# Patient Record
Sex: FEMALE | Race: WHITE | Employment: PART TIME | URBAN - METROPOLITAN AREA
[De-identification: names, ages, dates, MRNs, and addresses within clinical notes are randomized per-mention and may not be internally consistent; named-entity substitution may affect disease eponyms.]

---

## 2017-08-31 ENCOUNTER — HOSPITAL ENCOUNTER (INPATIENT)
Age: 57
LOS: 1 days | Discharge: HOME | DRG: 313 | End: 2017-08-31
Attending: EMERGENCY MEDICINE | Admitting: INTERNAL MEDICINE
Payer: SUBSIDIZED

## 2017-08-31 ENCOUNTER — APPOINTMENT (OUTPATIENT)
Dept: GENERAL RADIOLOGY | Age: 57
DRG: 313 | End: 2017-08-31
Attending: PHYSICIAN ASSISTANT
Payer: SUBSIDIZED

## 2017-08-31 VITALS
SYSTOLIC BLOOD PRESSURE: 140 MMHG | HEART RATE: 65 BPM | DIASTOLIC BLOOD PRESSURE: 91 MMHG | HEIGHT: 65 IN | BODY MASS INDEX: 37.32 KG/M2 | OXYGEN SATURATION: 97 % | WEIGHT: 224 LBS | TEMPERATURE: 98.4 F | RESPIRATION RATE: 16 BRPM

## 2017-08-31 DIAGNOSIS — I20.0 UNSTABLE ANGINA PECTORIS (HCC): Primary | ICD-10-CM

## 2017-08-31 PROBLEM — R07.9 CHEST PAIN: Status: ACTIVE | Noted: 2017-08-31

## 2017-08-31 LAB
ALBUMIN SERPL-MCNC: 4.1 G/DL (ref 3.4–5)
ALBUMIN/GLOB SERPL: 1.2 {RATIO} (ref 0.8–1.7)
ALP SERPL-CCNC: 65 U/L (ref 45–117)
ALT SERPL-CCNC: 26 U/L (ref 13–56)
ANION GAP SERPL CALC-SCNC: 9 MMOL/L (ref 3–18)
APPEARANCE UR: ABNORMAL
AST SERPL-CCNC: 26 U/L (ref 15–37)
ATRIAL RATE: 59 BPM
ATRIAL RATE: 79 BPM
BACTERIA URNS QL MICRO: ABNORMAL /HPF
BASOPHILS # BLD: 0 K/UL (ref 0–0.1)
BASOPHILS NFR BLD: 0 % (ref 0–2)
BILIRUB SERPL-MCNC: 0.7 MG/DL (ref 0.2–1)
BILIRUB UR QL: NEGATIVE
BUN SERPL-MCNC: 12 MG/DL (ref 7–18)
BUN/CREAT SERPL: 18 (ref 12–20)
CALCIUM SERPL-MCNC: 8.2 MG/DL (ref 8.5–10.1)
CALCULATED P AXIS, ECG09: 33 DEGREES
CALCULATED P AXIS, ECG09: 34 DEGREES
CALCULATED R AXIS, ECG10: 38 DEGREES
CALCULATED R AXIS, ECG10: 52 DEGREES
CALCULATED T AXIS, ECG11: 107 DEGREES
CALCULATED T AXIS, ECG11: 112 DEGREES
CHLORIDE SERPL-SCNC: 108 MMOL/L (ref 100–108)
CK MB CFR SERPL CALC: 1.2 % (ref 0–4)
CK MB SERPL-MCNC: 7.7 NG/ML (ref 5–25)
CK SERPL-CCNC: 631 U/L (ref 26–192)
CO2 SERPL-SCNC: 24 MMOL/L (ref 21–32)
COLOR UR: YELLOW
CREAT SERPL-MCNC: 0.67 MG/DL (ref 0.6–1.3)
DIAGNOSIS, 93000: NORMAL
DIAGNOSIS, 93000: NORMAL
DIFFERENTIAL METHOD BLD: ABNORMAL
EOSINOPHIL # BLD: 0 K/UL (ref 0–0.4)
EOSINOPHIL NFR BLD: 1 % (ref 0–5)
EPITH CASTS URNS QL MICRO: ABNORMAL /LPF (ref 0–5)
ERYTHROCYTE [DISTWIDTH] IN BLOOD BY AUTOMATED COUNT: 14.2 % (ref 11.6–14.5)
GLOBULIN SER CALC-MCNC: 3.3 G/DL (ref 2–4)
GLUCOSE SERPL-MCNC: 109 MG/DL (ref 74–99)
GLUCOSE UR STRIP.AUTO-MCNC: NEGATIVE MG/DL
HCT VFR BLD AUTO: 37 % (ref 35–45)
HGB BLD-MCNC: 12.6 G/DL (ref 12–16)
HGB UR QL STRIP: NEGATIVE
KETONES UR QL STRIP.AUTO: NEGATIVE MG/DL
LEUKOCYTE ESTERASE UR QL STRIP.AUTO: ABNORMAL
LYMPHOCYTES # BLD: 2 K/UL (ref 0.9–3.6)
LYMPHOCYTES NFR BLD: 33 % (ref 21–52)
MAGNESIUM SERPL-MCNC: 2.5 MG/DL (ref 1.6–2.6)
MCH RBC QN AUTO: 34.4 PG (ref 24–34)
MCHC RBC AUTO-ENTMCNC: 34.1 G/DL (ref 31–37)
MCV RBC AUTO: 101.1 FL (ref 74–97)
MONOCYTES # BLD: 0.5 K/UL (ref 0.05–1.2)
MONOCYTES NFR BLD: 9 % (ref 3–10)
NEUTS SEG # BLD: 3.5 K/UL (ref 1.8–8)
NEUTS SEG NFR BLD: 57 % (ref 40–73)
NITRITE UR QL STRIP.AUTO: NEGATIVE
P-R INTERVAL, ECG05: 178 MS
P-R INTERVAL, ECG05: 192 MS
PH UR STRIP: 6 [PH] (ref 5–8)
PLATELET # BLD AUTO: 110 K/UL (ref 135–420)
PMV BLD AUTO: 13.6 FL (ref 9.2–11.8)
POTASSIUM SERPL-SCNC: 3.5 MMOL/L (ref 3.5–5.5)
PROT SERPL-MCNC: 7.4 G/DL (ref 6.4–8.2)
PROT UR STRIP-MCNC: ABNORMAL MG/DL
Q-T INTERVAL, ECG07: 414 MS
Q-T INTERVAL, ECG07: 452 MS
QRS DURATION, ECG06: 80 MS
QRS DURATION, ECG06: 82 MS
QTC CALCULATION (BEZET), ECG08: 447 MS
QTC CALCULATION (BEZET), ECG08: 474 MS
RBC # BLD AUTO: 3.66 M/UL (ref 4.2–5.3)
SODIUM SERPL-SCNC: 141 MMOL/L (ref 136–145)
SP GR UR REFRACTOMETRY: 1.02 (ref 1–1.03)
TROPONIN I BLD-MCNC: <0.04 NG/ML (ref 0–0.08)
TROPONIN I SERPL-MCNC: <0.02 NG/ML (ref 0–0.04)
UROBILINOGEN UR QL STRIP.AUTO: 1 EU/DL (ref 0.2–1)
VENTRICULAR RATE, ECG03: 59 BPM
VENTRICULAR RATE, ECG03: 79 BPM
WBC # BLD AUTO: 6.1 K/UL (ref 4.6–13.2)
WBC URNS QL MICRO: ABNORMAL /HPF (ref 0–4)

## 2017-08-31 PROCEDURE — 65660000000 HC RM CCU STEPDOWN

## 2017-08-31 PROCEDURE — 71010 XR CHEST PORT: CPT

## 2017-08-31 PROCEDURE — 84484 ASSAY OF TROPONIN QUANT: CPT | Performed by: PHYSICIAN ASSISTANT

## 2017-08-31 PROCEDURE — 85025 COMPLETE CBC W/AUTO DIFF WBC: CPT | Performed by: PHYSICIAN ASSISTANT

## 2017-08-31 PROCEDURE — 83735 ASSAY OF MAGNESIUM: CPT | Performed by: PHYSICIAN ASSISTANT

## 2017-08-31 PROCEDURE — 80053 COMPREHEN METABOLIC PANEL: CPT | Performed by: PHYSICIAN ASSISTANT

## 2017-08-31 PROCEDURE — 82550 ASSAY OF CK (CPK): CPT | Performed by: PHYSICIAN ASSISTANT

## 2017-08-31 PROCEDURE — 93005 ELECTROCARDIOGRAM TRACING: CPT

## 2017-08-31 PROCEDURE — 99284 EMERGENCY DEPT VISIT MOD MDM: CPT

## 2017-08-31 PROCEDURE — 81001 URINALYSIS AUTO W/SCOPE: CPT | Performed by: PHYSICIAN ASSISTANT

## 2017-08-31 PROCEDURE — 74011250637 HC RX REV CODE- 250/637: Performed by: EMERGENCY MEDICINE

## 2017-08-31 RX ORDER — CEPHALEXIN 500 MG/1
500 CAPSULE ORAL 2 TIMES DAILY
Qty: 14 CAP | Refills: 0 | Status: SHIPPED | OUTPATIENT
Start: 2017-08-31 | End: 2017-09-07

## 2017-08-31 RX ORDER — GUAIFENESIN 100 MG/5ML
81 LIQUID (ML) ORAL DAILY
Qty: 1 TAB | Refills: 0 | Status: SHIPPED | OUTPATIENT
Start: 2017-08-31 | End: 2020-02-17

## 2017-08-31 RX ORDER — NITROGLYCERIN 400 UG/1
1 SPRAY ORAL
Qty: 1 BOTTLE | Refills: 0 | Status: SHIPPED | OUTPATIENT
Start: 2017-08-31 | End: 2017-09-10

## 2017-08-31 RX ORDER — ENOXAPARIN SODIUM 100 MG/ML
1 INJECTION SUBCUTANEOUS
Status: DISCONTINUED | OUTPATIENT
Start: 2017-08-31 | End: 2017-08-31

## 2017-08-31 RX ORDER — ASPIRIN 325 MG
325 TABLET ORAL
Status: COMPLETED | OUTPATIENT
Start: 2017-08-31 | End: 2017-08-31

## 2017-08-31 RX ORDER — CEPHALEXIN 250 MG/1
500 CAPSULE ORAL
Status: COMPLETED | OUTPATIENT
Start: 2017-08-31 | End: 2017-08-31

## 2017-08-31 RX ADMIN — ASPIRIN 325 MG ORAL TABLET 325 MG: 325 PILL ORAL at 19:30

## 2017-08-31 RX ADMIN — CEPHALEXIN 500 MG: 250 CAPSULE ORAL at 18:21

## 2017-08-31 NOTE — ED NOTES
I performed a brief evaluation, including history and physical, of the patient here in triage and I have determined that pt will need further treatment and evaluation from the main side ER physician. I have placed initial orders to help in expediting patients care.      August 31, 2017 at 2:04 PM - BHAVIN Jean        Visit Vitals    BP (!) 152/100 (BP 1 Location: Left arm, BP Patient Position: At rest)    Pulse 83    Temp 98.4 °F (36.9 °C)    Resp 18    Ht 5' 5\" (1.651 m)    Wt 101.6 kg (224 lb)    SpO2 96%    BMI 37.28 kg/m2

## 2017-08-31 NOTE — ED PROVIDER NOTES
HPI Comments: 3:32 PM April Elsie Ann is a 61 y.o. female with a h/o Ovarian CA and Thyroid Disease who presents to the ED c/o sharp intermittent CP, lasting 3-10 minutes, that began this morning when she woke up. The patient is also complaining of associated dizziness, L arm numbness/paresthesia, and feeling \"giddy\" at the onset. She states that exertion exacerbates her CP and resting makes it a \"little better. \" The patient denies NV, SOB, diaphoresis, a h/o heart disease and additional complaints or concerns. The history is provided by the patient. Past Medical History:   Diagnosis Date    [facility] [address]     [facility] cancer ([facility] [address] [facility]     Thyroid disease        Past Surgical History:   Procedure Laterality Date    HX HYSTERECTOMY           Family History:   Problem Relation Age of Onset    Thyroid Disease Mother     Cancer Brother 24     brain    Cancer Maternal Aunt      lung    Breast Problems Maternal Aunt 29    Cancer Paternal Aunt     Cancer Paternal Uncle     Cancer Maternal Grandmother     Cancer Maternal Grandfather     Cancer Other      child leukemia    Heart Attack Other        Social History     Social History    Marital status:      Spouse name: N/A    Number of children: N/A    Years of education: N/A     Occupational History    Not on file. Social History Main Topics    Smoking status: Current Every Day Smoker    Smokeless tobacco: Never Used    Alcohol use No    Drug use: No    Sexual activity: No     Other Topics Concern    Not on file     Social History Narrative         ALLERGIES: Adhesive    Review of Systems   Cardiovascular: Positive for chest pain. Neurological: Positive for dizziness and numbness (L arm). + L arm paresthesia    All other systems reviewed and are negative.       Vitals:    08/31/17 1356   BP: (!) 152/100   Pulse: 83   Resp: 18   Temp: 98.4 °F (36.9 °C)   SpO2: 96%   Weight: 101.6 kg (224 lb)   Height: 5' 5\" (1.651 m) Physical Exam   Constitutional: She is oriented to person, place, and time. She appears well-developed. HENT:   Head: Normocephalic and atraumatic. Eyes: EOM are normal. Pupils are equal, round, and reactive to light. Neck: Normal range of motion. Neck supple. Cardiovascular: Normal rate, regular rhythm and normal heart sounds. Exam reveals no friction rub. No murmur heard. Pulmonary/Chest: Effort normal and breath sounds normal. No respiratory distress. She has no wheezes. Abdominal: Soft. She exhibits no distension. There is no tenderness. There is no rebound and no guarding. Musculoskeletal: Normal range of motion. Neurological: She is alert and oriented to person, place, and time. Skin: Skin is warm and dry. Psychiatric: She has a normal mood and affect. Her behavior is normal. Thought content normal.        MDM  Number of Diagnoses or Management Options  Diagnosis management comments:   EKG: Sinus is 79 normal axis normal intervals no ST elevation or depression or hypertrophy. 50-year-old female with exertional chest pressure or tightness better with rest nausea and vomiting or diaphoresis. It is reproducible with exertion. Low suspicion of PE or DVT no further testing at this point. Dw/ dr Alli Scott; will consutl. D/w dr Remi Briones; will admit. Cxr napd    uA noted, asx; Po abx sufficient     Pt refusing to stay; understands importance testing and risk of death and or permanent disability from leaving. Has to take care of mom, no alternatives. Will send to outpt stress. Refer to cardiology.      ekg #2 no change     ED Course       Procedures    Scribe Attestation      Mackenzie  acting as a scribe for and in the presence of Raj Luther MD      August 31, 2017 at 4:11 PM       Provider Attestation:      I personally performed the services described in the documentation, reviewed the documentation, as recorded by the scribe in my presence, and it accurately and completely records my words and actions.  August 31, 2017 at 4:11 PM - Melissa Srinivasan MD

## 2017-08-31 NOTE — DISCHARGE INSTRUCTIONS
Chest Pain: Care Instructions  Your Care Instructions  There are many things that can cause chest pain. Some are not serious and will get better on their own in a few days. But some kinds of chest pain need more testing and treatment. Your doctor may have recommended a follow-up visit in the next 8 to 12 hours. If you are not getting better, you may need more tests or treatment. Even though your doctor has released you, you still need to watch for any problems. The doctor carefully checked you, but sometimes problems can develop later. If you have new symptoms or if your symptoms do not get better, get medical care right away. If you have worse or different chest pain or pressure that lasts more than 5 minutes or you passed out (lost consciousness), call 911 or seek other emergency help right away. A medical visit is only one step in your treatment. Even if you feel better, you still need to do what your doctor recommends, such as going to all suggested follow-up appointments and taking medicines exactly as directed. This will help you recover and help prevent future problems. How can you care for yourself at home? · Rest until you feel better. · Take your medicine exactly as prescribed. Call your doctor if you think you are having a problem with your medicine. · Do not drive after taking a prescription pain medicine. When should you call for help? Call 911 if:  · You passed out (lost consciousness). · You have severe difficulty breathing. · You have symptoms of a heart attack. These may include:  ¨ Chest pain or pressure, or a strange feeling in your chest.  ¨ Sweating. ¨ Shortness of breath. ¨ Nausea or vomiting. ¨ Pain, pressure, or a strange feeling in your back, neck, jaw, or upper belly or in one or both shoulders or arms. ¨ Lightheadedness or sudden weakness. ¨ A fast or irregular heartbeat.   After you call 911, the  may tell you to chew 1 adult-strength or 2 to 4 low-dose aspirin. Wait for an ambulance. Do not try to drive yourself. Call your doctor today if:  · You have any trouble breathing. · Your chest pain gets worse. · You are dizzy or lightheaded, or you feel like you may faint. · You are not getting better as expected. · You are having new or different chest pain. Where can you learn more? Go to http://shelley-ayan.info/. Enter A120 in the search box to learn more about \"Chest Pain: Care Instructions. \"  Current as of: March 20, 2017  Content Version: 11.3  © 8768-3679 [employer]. Care instructions adapted under license by [employer] (which disclaims liability or warranty for this information). If you have questions about a medical condition or this instruction, always ask your healthcare professional. [address] any warranty or liability for your use of this information.

## 2017-08-31 NOTE — Clinical Note
Status[de-identified] Inpatient [101] Type of Bed: Telemetry [19] Inpatient Hospitalization Certified Necessary for the Following Reasons: 3. Patient receiving treatment that can only be provided in an inpatient setting (further clarification in H&P documentation) Admitting Diagnosis: Chest pain [901857] Admitting Physician: Mary Anne Dunlap Attending Physician: Tyler Low [[id number]] Estimated Length of Stay: 2 Midnights Discharge Plan[de-identified] Home with Office Follow-up

## 2017-09-06 ENCOUNTER — TELEPHONE (OUTPATIENT)
Dept: CARDIOLOGY CLINIC | Age: 57
End: 2017-09-06

## 2019-02-01 ENCOUNTER — HOSPITAL ENCOUNTER (OUTPATIENT)
Dept: GENERAL RADIOLOGY | Age: 59
Discharge: HOME | End: 2019-02-01
Payer: MEDICAID

## 2019-02-01 DIAGNOSIS — Z72.0 TOBACCO ABUSE: ICD-10-CM

## 2019-02-01 PROCEDURE — 71046 X-RAY EXAM CHEST 2 VIEWS: CPT

## 2019-03-06 ENCOUNTER — HOSPITAL ENCOUNTER (OUTPATIENT)
Dept: LAB | Age: 59
Discharge: HOME | End: 2019-03-06

## 2019-03-06 LAB — XX-LABCORP SPECIMEN COL,LCBCF: NORMAL

## 2019-03-06 PROCEDURE — 99001 SPECIMEN HANDLING PT-LAB: CPT

## 2019-04-29 ENCOUNTER — HOSPITAL ENCOUNTER (OUTPATIENT)
Dept: MAMMOGRAPHY | Age: 59
Discharge: HOME | End: 2019-04-29
Attending: FAMILY MEDICINE
Payer: MEDICAID

## 2019-04-29 ENCOUNTER — HOSPITAL ENCOUNTER (OUTPATIENT)
Dept: BONE DENSITY | Age: 59
Discharge: HOME | End: 2019-04-29
Attending: FAMILY MEDICINE
Payer: MEDICAID

## 2019-04-29 ENCOUNTER — HOSPITAL ENCOUNTER (OUTPATIENT)
Dept: LAB | Age: 59
Discharge: HOME | End: 2019-04-29

## 2019-04-29 DIAGNOSIS — Z12.39 SCREENING BREAST EXAMINATION: ICD-10-CM

## 2019-04-29 DIAGNOSIS — Z13.820 OSTEOPOROSIS SCREENING: ICD-10-CM

## 2019-04-29 LAB — XX-LABCORP SPECIMEN COL,LCBCF: NORMAL

## 2019-04-29 PROCEDURE — 99001 SPECIMEN HANDLING PT-LAB: CPT

## 2019-04-29 PROCEDURE — 77063 BREAST TOMOSYNTHESIS BI: CPT

## 2019-04-29 PROCEDURE — 77080 DXA BONE DENSITY AXIAL: CPT

## 2019-05-06 ENCOUNTER — HOSPITAL ENCOUNTER (OUTPATIENT)
Dept: GENERAL RADIOLOGY | Age: 59
Discharge: HOME | End: 2019-05-06
Payer: MEDICAID

## 2019-05-06 DIAGNOSIS — M25.476 EFFUSION OF ANKLE AND FOOT JOINT: ICD-10-CM

## 2019-05-06 DIAGNOSIS — M25.473 EFFUSION OF ANKLE AND FOOT JOINT: ICD-10-CM

## 2019-05-06 PROCEDURE — 73610 X-RAY EXAM OF ANKLE: CPT

## 2019-07-19 ENCOUNTER — HOSPITAL ENCOUNTER (OUTPATIENT)
Dept: GENERAL RADIOLOGY | Age: 59
Discharge: HOME | End: 2019-07-19
Payer: MEDICAID

## 2019-07-19 DIAGNOSIS — M79.641 BILATERAL HAND PAIN: ICD-10-CM

## 2019-07-19 DIAGNOSIS — J06.9 ACUTE RESPIRATORY DISEASE: ICD-10-CM

## 2019-07-19 DIAGNOSIS — M79.642 BILATERAL HAND PAIN: ICD-10-CM

## 2019-07-19 PROCEDURE — 73130 X-RAY EXAM OF HAND: CPT

## 2019-07-19 PROCEDURE — 71046 X-RAY EXAM CHEST 2 VIEWS: CPT

## 2019-09-30 ENCOUNTER — HOSPITAL ENCOUNTER (OUTPATIENT)
Dept: CT IMAGING | Age: 59
Discharge: HOME | End: 2019-09-30
Attending: FAMILY MEDICINE
Payer: MEDICAID

## 2019-09-30 DIAGNOSIS — R05.3 CHRONIC COUGH: ICD-10-CM

## 2019-09-30 PROCEDURE — 71250 CT THORAX DX C-: CPT

## 2019-10-21 ENCOUNTER — HOSPITAL ENCOUNTER (OUTPATIENT)
Dept: GENERAL RADIOLOGY | Age: 59
Discharge: HOME | End: 2019-10-21
Payer: MEDICAID

## 2019-10-21 DIAGNOSIS — M25.572 LEFT ANKLE PAIN: ICD-10-CM

## 2019-10-21 PROCEDURE — 73610 X-RAY EXAM OF ANKLE: CPT

## 2019-11-04 ENCOUNTER — HOSPITAL ENCOUNTER (OUTPATIENT)
Dept: LAB | Age: 59
Discharge: HOME | End: 2019-11-04

## 2019-11-04 LAB — XX-LABCORP SPECIMEN COL,LCBCF: NORMAL

## 2019-11-04 PROCEDURE — 99001 SPECIMEN HANDLING PT-LAB: CPT

## 2019-11-08 ENCOUNTER — HOSPITAL ENCOUNTER (OUTPATIENT)
Dept: CT IMAGING | Age: 59
Discharge: HOME | End: 2019-11-08
Attending: FAMILY MEDICINE

## 2019-11-08 DIAGNOSIS — R91.8 LUNG NODULES: ICD-10-CM

## 2019-11-18 ENCOUNTER — OFFICE VISIT (OUTPATIENT)
Dept: SURGERY | Age: 59
End: 2019-11-18

## 2019-11-18 VITALS
RESPIRATION RATE: 18 BRPM | WEIGHT: 210 LBS | DIASTOLIC BLOOD PRESSURE: 72 MMHG | SYSTOLIC BLOOD PRESSURE: 118 MMHG | OXYGEN SATURATION: 95 % | BODY MASS INDEX: 34.99 KG/M2 | HEART RATE: 74 BPM | HEIGHT: 65 IN | TEMPERATURE: 97.9 F

## 2019-11-18 DIAGNOSIS — K80.20 GALLSTONES: Primary | ICD-10-CM

## 2019-11-18 NOTE — PROGRESS NOTES
General Surgery Consult      Dayan Peter  Admit date: (Not on file)    MRN: [id number]     : 1960     Age: 60 y.o. Attending Physician: Betito Ramesh MD, FACS      Subjective:     April is a 60 y.o. female who was referred to me for symptomatic cholelithiasis. The patient has stated that she been having she is been complaining of abdominal pain for about 1 year . She said that mainly the pain is localized in the right upper quadrant . She also has been having some lower abdominal pain localizing in the suprapubic area that she stated that based seems like menstrual-like pain . She said that the right upper quadrant is worse after eating fatty food especially cheese and milk shake. She also stated that she has nausea. She had an ultrasound that showed cholelithiasis but no evidence of cholecystitis. She had a history of previous  with a midline laparotomy extending from the suprapubic area to the umbilicus. The patient  has not had jaundice, acholic stools or dark urine and has not had a history of pancreatitis or hepatitis. There is a recent CT scan of the chest that showed cholelithiasis. Patient Active Problem List    Diagnosis Date Noted    Chest pain 2017    Spinal stenosis of lumbar region with radiculopathy 2014    Degenerative lumbar spinal stenosis 2014    GERD (gastroesophageal reflux disease) 02/10/2014    Hypertension 02/10/2014    Hypothyroid 02/10/2014     Past Medical History:   Diagnosis Date    [facility] ([facility] [address]     Ovarian cancer ([facility] [address]     Thyroid disease       Past Surgical History:   Procedure Laterality Date    COLONOSCOPY N/A 2019    COLONOSCOPY, SCREENING with hot snare polypectomy, w/ Bx performed by Jalen Kelly MD at [address] [place] [address] HX HYSTERECTOMY        Social History     Tobacco Use    Smoking status: Current Every Day Smoker    Smokeless tobacco: Never Used   Substance Use Topics    Alcohol use:  No Social History     Tobacco Use   Smoking Status Current Every Day Smoker   Smokeless Tobacco Never Used     Family History   Problem Relation Age of Onset    Thyroid Disease Mother     Cancer Brother 24        brain    Cancer Maternal Aunt         lung    Breast Problems Maternal Aunt 29    Cancer Paternal Aunt     Cancer Paternal Uncle     Cancer Maternal Grandmother     Cancer Maternal Grandfather     Cancer Other         child leukemia    Heart Attack Other       Current Outpatient Medications   Medication Sig    mv-min-vit C-Glu-Tawana [id number] (AIRBORNE, WITH LYSINE ACETATE,) 250-12.5 mg chew Take 2 Tabs by mouth.  mv,Ca,min-iron-FA-guarana-caff (ONE-A-DAY WOMEN'S ACTIVE) 18 mg iron- 400 mcg-180 mg tab Take 1 Tab by mouth.  aspirin 81 mg chewable tablet Take 1 Tab by mouth daily.  Hydrochlorothiazide 12.5 mg tablet Take 12.5 mg by mouth daily.  levothyroxine (SYNTHROID) 75 mcg tablet Take 1 Tab by mouth Daily (before breakfast). (Patient taking differently: Take 125 mcg by mouth Daily (before breakfast). )    gabapentin (NEURONTIN) 100 mg capsule Take 1 Cap by mouth three (3) times daily.  buPROPion SR (WELLBUTRIN SR) 150 mg SR tablet Take one tab po once a day for 3 days, then increase to one tab po bid x 12 weeks. No current facility-administered medications for this visit.        Allergies   Allergen Reactions    Latex Hives    Morphine Anaphylaxis    Adhesive Hives        Review of Systems:  Constitutional: negative  Eyes: negative  Ears, Nose, Mouth, Throat, and Face: negative  Respiratory: negative  Cardiovascular: negative  Gastrointestinal: positive for nausea and abdominal pain  Genitourinary:negative  Integument/Breast: negative  Hematologic/Lymphatic: negative  Musculoskeletal:negative  Neurological: negative  Behavioral/Psychiatric: negative  Endocrine: negative  Allergic/Immunologic: negative    Objective:     Visit Vitals  /72   Pulse 74   Temp 97.9 °F (36.6 °C) (Oral)   Resp 18   Ht 5' 5\" (1.651 m)   Wt 95.3 kg (210 lb)   SpO2 95%   BMI 34.95 kg/m²       Physical Exam:      General:  in no apparent distress, alert, oriented times 3, afebrile, normal vitals and anicteric   Eyes:  conjunctivae and sclerae normal, pupils equal, round, reactive to light   Throat & Neck: no erythema or exudates noted and neck supple and symmetrical; no palpable masses   Lungs:   clear to auscultation bilaterally   Heart:  Regular rate and rhythm   Abdomen:   rounded, soft, nontender except for localized right upper quadrant tenderness, nondistended, no masses or organomegaly. There is a midline laparotomy in the lower abdomen that is well-healed. There is no evidence of any abdominal wall hernias. Extremities: extremities normal, atraumatic, no cyanosis or edema   Skin: Normal.         Imaging and Lab Review:     CBC:   Lab Results   Component Value Date/Time    WBC 6.1 08/31/2017 03:00 PM    RBC 3.66 (L) 08/31/2017 03:00 PM    HGB 12.6 08/31/2017 03:00 PM    HCT 37.0 08/31/2017 03:00 PM    PLATELET 656 (L) 04/52/5841 03:00 PM     BMP:   Lab Results   Component Value Date/Time    Glucose 109 (H) 08/31/2017 03:00 PM    Sodium 141 08/31/2017 03:00 PM    Potassium 3.5 08/31/2017 03:00 PM    Chloride 108 08/31/2017 03:00 PM    CO2 24 08/31/2017 03:00 PM    BUN 12 08/31/2017 03:00 PM    Creatinine 0.67 08/31/2017 03:00 PM    Calcium 8.2 (L) 08/31/2017 03:00 PM     CMP:  Lab Results   Component Value Date/Time    Glucose 109 (H) 08/31/2017 03:00 PM    Sodium 141 08/31/2017 03:00 PM    Potassium 3.5 08/31/2017 03:00 PM    Chloride 108 08/31/2017 03:00 PM    CO2 24 08/31/2017 03:00 PM    BUN 12 08/31/2017 03:00 PM    Creatinine 0.67 08/31/2017 03:00 PM    Calcium 8.2 (L) 08/31/2017 03:00 PM    Anion gap 9 08/31/2017 03:00 PM    BUN/Creatinine ratio 18 08/31/2017 03:00 PM    Alk.  phosphatase 65 08/31/2017 03:00 PM    Protein, total 7.4 08/31/2017 03:00 PM    Albumin 4.1 08/31/2017 03:00 PM Globulin 3.3 08/31/2017 03:00 PM    A-G Ratio 1.2 08/31/2017 03:00 PM       No results found for this or any previous visit (from the past 24 hour(s)). images and reports reviewed    Assessment:   Dayan Marion is a 60 y.o. female is presenting with abdominal pain and a picture of symptomatic cholelithiasis. I explained the indications for robotic cholecystectomy as well as the alternatives. I discussed the potential risks, including but not limited to bleeding, wound infection, trocar injuries, bile duct injury and leak, and also the possible need for conversion to open procedure. I also explained the firefly technology and how it allow us to visualize the biliary tree to avoid bile duct injury or leak. She indicates that she understands the risks, accepts and wishes to proceed. I also explained to the patient that her lower abdominal pain are not related to her cholelithiasis and most likely related to some GYN reason or possible adhesions. Plan:     1. Will schedule for robotic cholecystectomy with firefly (ICG) technology for identification of the biliary tree. 2. No heavy lifting (more than 15 pounds) for 2 weeks after the surgery. 3. Avoid constipation after the surgery (take stool softener).      Please call me if you have any questions (cell phone: [phone])     Signed By: Jojo Vera MD     November 18, 2019

## 2019-11-18 NOTE — PROGRESS NOTES
Chief Complaint   Patient presents with    Abdominal Pain     pt referred by Kathi Gomes. Pt c/o sharp pains in center of stomach. States feels bloated and gets constipated and diarrhea.

## 2019-11-19 ENCOUNTER — TELEPHONE (OUTPATIENT)
Dept: SURGERY | Age: 59
End: 2019-11-19

## 2019-11-19 NOTE — TELEPHONE ENCOUNTER
Spoke with Ms. America Lang to discuss scheduling surgery (cholecystectomy) with Dr. Jose Rodriguez. Ms. America Lang states she works at [employer] and would prefer to wait until after January 3, 2020 to schedule her surgery because of her busy work schedule during the holiday season. I told Ms. Peter I'll contact her with further information tomorrow/need to obtain OR availability for new year.

## 2020-02-10 ENCOUNTER — HOSPITAL ENCOUNTER (OUTPATIENT)
Dept: LAB | Age: 60
Discharge: HOME | End: 2020-02-10

## 2020-02-10 ENCOUNTER — HOSPITAL ENCOUNTER (OUTPATIENT)
Dept: LAB | Age: 60
Discharge: HOME | End: 2020-02-10
Payer: MEDICAID

## 2020-02-10 DIAGNOSIS — Z01.818 PRE-OP EVALUATION: ICD-10-CM

## 2020-02-10 LAB
ANION GAP SERPL CALC-SCNC: 3 MMOL/L (ref 3–18)
ATRIAL RATE: 64 BPM
BASOPHILS # BLD: 0 K/UL (ref 0–0.1)
BASOPHILS NFR BLD: 0 % (ref 0–2)
BUN SERPL-MCNC: 15 MG/DL (ref 7–18)
BUN/CREAT SERPL: 25 (ref 12–20)
CALCIUM SERPL-MCNC: 9.1 MG/DL (ref 8.5–10.1)
CALCULATED P AXIS, ECG09: 15 DEGREES
CALCULATED R AXIS, ECG10: 7 DEGREES
CALCULATED T AXIS, ECG11: 17 DEGREES
CHLORIDE SERPL-SCNC: 111 MMOL/L (ref 100–111)
CO2 SERPL-SCNC: 26 MMOL/L (ref 21–32)
CREAT SERPL-MCNC: 0.6 MG/DL (ref 0.6–1.3)
DIAGNOSIS, 93000: NORMAL
DIFFERENTIAL METHOD BLD: ABNORMAL
EOSINOPHIL # BLD: 0.1 K/UL (ref 0–0.4)
EOSINOPHIL NFR BLD: 1 % (ref 0–5)
ERYTHROCYTE [DISTWIDTH] IN BLOOD BY AUTOMATED COUNT: 13.6 % (ref 11.6–14.5)
GLUCOSE SERPL-MCNC: 90 MG/DL (ref 74–99)
HCT VFR BLD AUTO: 40.9 % (ref 35–45)
HGB BLD-MCNC: 13.7 G/DL (ref 12–16)
LYMPHOCYTES # BLD: 1.9 K/UL (ref 0.9–3.6)
LYMPHOCYTES NFR BLD: 33 % (ref 21–52)
MCH RBC QN AUTO: 32.3 PG (ref 24–34)
MCHC RBC AUTO-ENTMCNC: 33.5 G/DL (ref 31–37)
MCV RBC AUTO: 96.5 FL (ref 74–97)
MONOCYTES # BLD: 1 K/UL (ref 0.05–1.2)
MONOCYTES NFR BLD: 17 % (ref 3–10)
NEUTS SEG # BLD: 2.9 K/UL (ref 1.8–8)
NEUTS SEG NFR BLD: 49 % (ref 40–73)
P-R INTERVAL, ECG05: 178 MS
PLATELET # BLD AUTO: 155 K/UL (ref 135–420)
PMV BLD AUTO: 13.3 FL (ref 9.2–11.8)
POTASSIUM SERPL-SCNC: 4.2 MMOL/L (ref 3.5–5.5)
Q-T INTERVAL, ECG07: 436 MS
QRS DURATION, ECG06: 80 MS
QTC CALCULATION (BEZET), ECG08: 449 MS
RBC # BLD AUTO: 4.24 M/UL (ref 4.2–5.3)
SODIUM SERPL-SCNC: 140 MMOL/L (ref 136–145)
VENTRICULAR RATE, ECG03: 64 BPM
WBC # BLD AUTO: 5.9 K/UL (ref 4.6–13.2)

## 2020-02-10 PROCEDURE — 36415 COLL VENOUS BLD VENIPUNCTURE: CPT

## 2020-02-10 PROCEDURE — 80048 BASIC METABOLIC PNL TOTAL CA: CPT

## 2020-02-10 PROCEDURE — 93005 ELECTROCARDIOGRAM TRACING: CPT

## 2020-02-10 PROCEDURE — 85025 COMPLETE CBC W/AUTO DIFF WBC: CPT

## 2020-02-13 RX ORDER — SERTRALINE HYDROCHLORIDE 25 MG/1
25 TABLET, FILM COATED ORAL DAILY
Status: ON HOLD | COMMUNITY
End: 2021-07-21

## 2020-02-13 RX ORDER — CHOLECALCIFEROL (VITAMIN D3) 125 MCG
CAPSULE ORAL
COMMUNITY

## 2020-02-13 RX ORDER — LANOLIN ALCOHOL/MO/W.PET/CERES
500 CREAM (GRAM) TOPICAL DAILY
COMMUNITY

## 2020-02-15 ENCOUNTER — ANESTHESIA EVENT (OUTPATIENT)
Dept: SURGERY | Age: 60
End: 2020-02-15
Payer: MEDICAID

## 2020-02-17 ENCOUNTER — ANESTHESIA (OUTPATIENT)
Dept: SURGERY | Age: 60
End: 2020-02-17
Payer: MEDICAID

## 2020-02-17 ENCOUNTER — HOSPITAL ENCOUNTER (OUTPATIENT)
Age: 60
Setting detail: OUTPATIENT SURGERY
Discharge: HOME | End: 2020-02-17
Attending: OTOLARYNGOLOGY | Admitting: OTOLARYNGOLOGY
Payer: MEDICAID

## 2020-02-17 VITALS
RESPIRATION RATE: 20 BRPM | WEIGHT: 221 LBS | TEMPERATURE: 98.1 F | HEIGHT: 66 IN | HEART RATE: 67 BPM | BODY MASS INDEX: 35.52 KG/M2 | SYSTOLIC BLOOD PRESSURE: 133 MMHG | OXYGEN SATURATION: 92 % | DIASTOLIC BLOOD PRESSURE: 77 MMHG

## 2020-02-17 PROCEDURE — 74011250636 HC RX REV CODE- 250/636: Performed by: NURSE ANESTHETIST, CERTIFIED REGISTERED

## 2020-02-17 PROCEDURE — 77030018846 HC SOL IRR STRL H20 ICUM -A: Performed by: OTOLARYNGOLOGY

## 2020-02-17 PROCEDURE — 76060000032 HC ANESTHESIA 0.5 TO 1 HR: Performed by: OTOLARYNGOLOGY

## 2020-02-17 PROCEDURE — 74011250637 HC RX REV CODE- 250/637: Performed by: OTOLARYNGOLOGY

## 2020-02-17 PROCEDURE — 88305 TISSUE EXAM BY PATHOLOGIST: CPT

## 2020-02-17 PROCEDURE — 76210000020 HC REC RM PH II FIRST 0.5 HR: Performed by: OTOLARYNGOLOGY

## 2020-02-17 PROCEDURE — 76010000138 HC OR TIME 0.5 TO 1 HR: Performed by: OTOLARYNGOLOGY

## 2020-02-17 PROCEDURE — 76210000063 HC OR PH I REC FIRST 0.5 HR: Performed by: OTOLARYNGOLOGY

## 2020-02-17 PROCEDURE — 77030026438 HC STYL ET INTUB CARD -A: Performed by: NURSE ANESTHETIST, CERTIFIED REGISTERED

## 2020-02-17 PROCEDURE — 74011250637 HC RX REV CODE- 250/637: Performed by: NURSE ANESTHETIST, CERTIFIED REGISTERED

## 2020-02-17 PROCEDURE — 74011000250 HC RX REV CODE- 250: Performed by: NURSE ANESTHETIST, CERTIFIED REGISTERED

## 2020-02-17 PROCEDURE — 77030008683 HC TU ET CUF COVD -A: Performed by: NURSE ANESTHETIST, CERTIFIED REGISTERED

## 2020-02-17 RX ORDER — FAMOTIDINE 20 MG/1
20 TABLET, FILM COATED ORAL ONCE
Status: COMPLETED | OUTPATIENT
Start: 2020-02-17 | End: 2020-02-17

## 2020-02-17 RX ORDER — MAGNESIUM SULFATE 100 %
4 CRYSTALS MISCELLANEOUS AS NEEDED
Status: DISCONTINUED | OUTPATIENT
Start: 2020-02-17 | End: 2020-02-17 | Stop reason: HOSPADM

## 2020-02-17 RX ORDER — FENTANYL CITRATE 50 UG/ML
50 INJECTION, SOLUTION INTRAMUSCULAR; INTRAVENOUS AS NEEDED
Status: DISCONTINUED | OUTPATIENT
Start: 2020-02-17 | End: 2020-02-17 | Stop reason: HOSPADM

## 2020-02-17 RX ORDER — GLYCOPYRROLATE 0.2 MG/ML
INJECTION INTRAMUSCULAR; INTRAVENOUS AS NEEDED
Status: DISCONTINUED | OUTPATIENT
Start: 2020-02-17 | End: 2020-02-17 | Stop reason: HOSPADM

## 2020-02-17 RX ORDER — SODIUM CHLORIDE 0.9 % (FLUSH) 0.9 %
5-40 SYRINGE (ML) INJECTION AS NEEDED
Status: DISCONTINUED | OUTPATIENT
Start: 2020-02-17 | End: 2020-02-17 | Stop reason: HOSPADM

## 2020-02-17 RX ORDER — SUCCINYLCHOLINE CHLORIDE 20 MG/ML
INJECTION INTRAMUSCULAR; INTRAVENOUS AS NEEDED
Status: DISCONTINUED | OUTPATIENT
Start: 2020-02-17 | End: 2020-02-17 | Stop reason: HOSPADM

## 2020-02-17 RX ORDER — SODIUM CHLORIDE, SODIUM LACTATE, POTASSIUM CHLORIDE, CALCIUM CHLORIDE 600; 310; 30; 20 MG/100ML; MG/100ML; MG/100ML; MG/100ML
75 INJECTION, SOLUTION INTRAVENOUS CONTINUOUS
Status: DISCONTINUED | OUTPATIENT
Start: 2020-02-17 | End: 2020-02-17 | Stop reason: HOSPADM

## 2020-02-17 RX ORDER — FENTANYL CITRATE 50 UG/ML
INJECTION, SOLUTION INTRAMUSCULAR; INTRAVENOUS AS NEEDED
Status: DISCONTINUED | OUTPATIENT
Start: 2020-02-17 | End: 2020-02-17 | Stop reason: HOSPADM

## 2020-02-17 RX ORDER — DEXTROSE 50 % IN WATER (D50W) INTRAVENOUS SYRINGE
25-50 AS NEEDED
Status: DISCONTINUED | OUTPATIENT
Start: 2020-02-17 | End: 2020-02-17 | Stop reason: HOSPADM

## 2020-02-17 RX ORDER — NEOSTIGMINE METHYLSULFATE 1 MG/ML
INJECTION, SOLUTION INTRAVENOUS AS NEEDED
Status: DISCONTINUED | OUTPATIENT
Start: 2020-02-17 | End: 2020-02-17 | Stop reason: HOSPADM

## 2020-02-17 RX ORDER — CEPHALEXIN 500 MG/1
500 CAPSULE ORAL 3 TIMES DAILY
Qty: 21 CAP | Refills: 0 | Status: SHIPPED | OUTPATIENT
Start: 2020-02-17 | End: 2020-02-24

## 2020-02-17 RX ORDER — OXYMETAZOLINE HCL 0.05 %
SPRAY, NON-AEROSOL (ML) NASAL AS NEEDED
Status: DISCONTINUED | OUTPATIENT
Start: 2020-02-17 | End: 2020-02-17 | Stop reason: HOSPADM

## 2020-02-17 RX ORDER — ROCURONIUM BROMIDE 10 MG/ML
INJECTION, SOLUTION INTRAVENOUS AS NEEDED
Status: DISCONTINUED | OUTPATIENT
Start: 2020-02-17 | End: 2020-02-17 | Stop reason: HOSPADM

## 2020-02-17 RX ORDER — PROPOFOL 10 MG/ML
INJECTION, EMULSION INTRAVENOUS AS NEEDED
Status: DISCONTINUED | OUTPATIENT
Start: 2020-02-17 | End: 2020-02-17 | Stop reason: HOSPADM

## 2020-02-17 RX ORDER — ONDANSETRON 2 MG/ML
INJECTION INTRAMUSCULAR; INTRAVENOUS AS NEEDED
Status: DISCONTINUED | OUTPATIENT
Start: 2020-02-17 | End: 2020-02-17 | Stop reason: HOSPADM

## 2020-02-17 RX ORDER — ONDANSETRON 2 MG/ML
4 INJECTION INTRAMUSCULAR; INTRAVENOUS ONCE
Status: COMPLETED | OUTPATIENT
Start: 2020-02-17 | End: 2020-02-17

## 2020-02-17 RX ORDER — DEXAMETHASONE SODIUM PHOSPHATE 4 MG/ML
INJECTION, SOLUTION INTRA-ARTICULAR; INTRALESIONAL; INTRAMUSCULAR; INTRAVENOUS; SOFT TISSUE AS NEEDED
Status: DISCONTINUED | OUTPATIENT
Start: 2020-02-17 | End: 2020-02-17 | Stop reason: HOSPADM

## 2020-02-17 RX ORDER — MIDAZOLAM HYDROCHLORIDE 1 MG/ML
INJECTION, SOLUTION INTRAMUSCULAR; INTRAVENOUS AS NEEDED
Status: DISCONTINUED | OUTPATIENT
Start: 2020-02-17 | End: 2020-02-17 | Stop reason: HOSPADM

## 2020-02-17 RX ORDER — SODIUM CHLORIDE, SODIUM LACTATE, POTASSIUM CHLORIDE, CALCIUM CHLORIDE 600; 310; 30; 20 MG/100ML; MG/100ML; MG/100ML; MG/100ML
25 INJECTION, SOLUTION INTRAVENOUS CONTINUOUS
Status: DISCONTINUED | OUTPATIENT
Start: 2020-02-17 | End: 2020-02-17 | Stop reason: HOSPADM

## 2020-02-17 RX ORDER — INSULIN LISPRO 100 [IU]/ML
INJECTION, SOLUTION INTRAVENOUS; SUBCUTANEOUS ONCE
Status: DISCONTINUED | OUTPATIENT
Start: 2020-02-17 | End: 2020-02-17 | Stop reason: HOSPADM

## 2020-02-17 RX ORDER — SODIUM CHLORIDE 0.9 % (FLUSH) 0.9 %
5-40 SYRINGE (ML) INJECTION EVERY 8 HOURS
Status: DISCONTINUED | OUTPATIENT
Start: 2020-02-17 | End: 2020-02-17 | Stop reason: HOSPADM

## 2020-02-17 RX ORDER — LIDOCAINE HYDROCHLORIDE 20 MG/ML
INJECTION, SOLUTION EPIDURAL; INFILTRATION; INTRACAUDAL; PERINEURAL AS NEEDED
Status: DISCONTINUED | OUTPATIENT
Start: 2020-02-17 | End: 2020-02-17 | Stop reason: HOSPADM

## 2020-02-17 RX ADMIN — SUCCINYLCHOLINE CHLORIDE 140 MG: 20 INJECTION, SOLUTION INTRAMUSCULAR; INTRAVENOUS at 10:24

## 2020-02-17 RX ADMIN — ROCURONIUM BROMIDE 5 MG: 10 INJECTION, SOLUTION INTRAVENOUS at 10:24

## 2020-02-17 RX ADMIN — FENTANYL CITRATE 50 MCG: 50 INJECTION INTRAMUSCULAR; INTRAVENOUS at 10:24

## 2020-02-17 RX ADMIN — MIDAZOLAM HYDROCHLORIDE 1 MG: 2 INJECTION, SOLUTION INTRAMUSCULAR; INTRAVENOUS at 10:12

## 2020-02-17 RX ADMIN — Medication 3 MG: at 10:45

## 2020-02-17 RX ADMIN — FENTANYL CITRATE 50 MCG: 50 INJECTION INTRAMUSCULAR; INTRAVENOUS at 10:12

## 2020-02-17 RX ADMIN — PROPOFOL 150 MG: 10 INJECTION, EMULSION INTRAVENOUS at 10:24

## 2020-02-17 RX ADMIN — FAMOTIDINE 20 MG: 20 TABLET, FILM COATED ORAL at 08:44

## 2020-02-17 RX ADMIN — ONDANSETRON 4 MG: 2 INJECTION INTRAMUSCULAR; INTRAVENOUS at 10:38

## 2020-02-17 RX ADMIN — SODIUM CHLORIDE, SODIUM LACTATE, POTASSIUM CHLORIDE, AND CALCIUM CHLORIDE 25 ML/HR: 600; 310; 30; 20 INJECTION, SOLUTION INTRAVENOUS at 08:38

## 2020-02-17 RX ADMIN — MIDAZOLAM HYDROCHLORIDE 1 MG: 2 INJECTION, SOLUTION INTRAMUSCULAR; INTRAVENOUS at 10:24

## 2020-02-17 RX ADMIN — ONDANSETRON 4 MG: 2 INJECTION INTRAMUSCULAR; INTRAVENOUS at 11:13

## 2020-02-17 RX ADMIN — GLYCOPYRROLATE 0.4 MG: 0.2 INJECTION INTRAMUSCULAR; INTRAVENOUS at 10:45

## 2020-02-17 RX ADMIN — LIDOCAINE HYDROCHLORIDE 80 MG: 20 INJECTION, SOLUTION EPIDURAL; INFILTRATION; INTRACAUDAL; PERINEURAL at 10:24

## 2020-02-17 RX ADMIN — DEXAMETHASONE SODIUM PHOSPHATE 8 MG: 4 INJECTION, SOLUTION INTRAMUSCULAR; INTRAVENOUS at 10:38

## 2020-02-17 NOTE — DISCHARGE INSTRUCTIONS
Acute Pain After Surgery: Care Instructions  Your Care Instructions    It's common to have some pain after surgery. Pain doesn't mean that something is wrong or that the surgery didn't go well. But when the pain is severe, it's important to work with your doctor to manage it. It's also important to be aware of a few facts about pain and pain medicine. · You are the only person who knows what your pain feels like. So be sure to tell your doctor when you are in pain or when the pain changes. Then he or she will know how to adjust your medicines. · Pain is often easier to control right after it starts. So it may be better to take regular doses of pain medicine and not wait until the pain gets bad. · Medicine can help control pain. But this doesn't mean you'll have no pain. Medicine works to keep the pain at a level you can live with. With time, you will feel better. Follow-up care is a key part of your treatment and safety. Be sure to make and go to all appointments, and call your doctor if you are having problems. It's also a good idea to know your test results and keep a list of the medicines you take. How can you care for yourself at home? · Be safe with medicines. Read and follow all instructions on the label. ? If the doctor gave you a prescription medicine for pain, take it as prescribed. ? If you are not taking a prescription pain medicine, ask your doctor if you can take an over-the-counter medicine. · If you take an over-the-counter pain medicine, such as acetaminophen (Tylenol), ibuprofen (Advil, Motrin), or naproxen (Aleve), read and follow all instructions on the label. · Do not take two or more pain medicines at the same time unless the doctor told you to. · Do not drink alcohol while you are taking pain medicines. · Try to walk each day if your doctor recommends it. Start by walking a little more than you did the day before. Bit by bit, increase the amount you walk.  Walking increases blood flow. It also helps prevent pneumonia and constipation. · To prevent constipation from opioid pain medicines:  ? Talk to your doctor about a laxative. ? Include fruits, vegetables, beans, and whole grains in your diet each day. These foods are high in fiber. ? Drink plenty of fluids, enough so that your urine is light yellow or clear like water. Drink water, fruit juice, or other drinks that do not contain caffeine or alcohol. If you have kidney, heart, or liver disease and have to limit fluids, talk with your doctor before you increase the amount of fluids you drink. ? Take a fiber supplement, such as Citrucel or Metamucil, every day if needed. Read and follow all instructions on the label. If you take pain medicine for more than a few days, talk to your doctor before you take fiber. When should you call for help? Call your doctor now or seek immediate medical care if:    · Your pain gets worse.     · Your pain is not controlled by medicine.    Watch closely for changes in your health, and be sure to contact your doctor if you have any problems. Where can you learn more? Go to http://shelley-ayan.info/. Enter ([phone] [phone] in the search box to learn more about \"Acute Pain After Surgery: Care Instructions. \"  Current as of: June 26, 2019  Content Version: 12.2  © 8021-9154 [employer]. Care instructions adapted under license by [employer] (which disclaims liability or warranty for this information). If you have questions about a medical condition or this instruction, always ask your healthcare professional. [place] [address] any warranty or liability for your use of this information.     DISCHARGE SUMMARY from Nurse    PATIENT INSTRUCTIONS:    After general anesthesia or intravenous sedation, for 24 hours or while taking prescription Narcotics:  · Limit your activities  · Do not drive and operate hazardous machinery  · Do not make important personal or business decisions  · Do  not drink alcoholic beverages  · If you have not urinated within 8 hours after discharge, please contact your surgeon on call. Report the following to your surgeon:  · Excessive pain, swelling, redness or odor of or around the surgical area  · Temperature over 100.5  · Nausea and vomiting lasting longer than 4 hours or if unable to take medications  · Any signs of decreased circulation or nerve impairment to extremity: change in color, persistent  numbness, tingling, coldness or increase pain  · Any questions    *  Please give a list of your current medications to your Primary Care Provider. *  Please update this list whenever your medications are discontinued, doses are      changed, or new medications (including over-the-counter products) are added. *  Please carry medication information at all times in case of emergency situations. These are general instructions for a healthy lifestyle:    No smoking/ No tobacco products/ Avoid exposure to second hand smoke  Surgeon General's Warning:  Quitting smoking now greatly reduces serious risk to your health. Obesity, smoking, and sedentary lifestyle greatly increases your risk for illness    A healthy diet, regular physical exercise & weight monitoring are important for maintaining a healthy lifestyle    You may be retaining fluid if you have a history of heart failure or if you experience any of the following symptoms:  Weight gain of 3 pounds or more overnight or 5 pounds in a week, increased swelling in our hands or feet or shortness of breath while lying flat in bed. Please call your doctor as soon as you notice any of these symptoms; do not wait until your next office visit. The discharge information has been reviewed with the patient and parent. The patient and parent verbalized understanding.   Discharge medications reviewed with the patient and appropriate educational materials and side effects teaching were provided.   ___________________________________________________________________________________________________________________________________

## 2020-02-17 NOTE — H&P
[address] [place] [address]  HISTORY AND PHYSICAL Name:  Cristi Maldonado 
MR#:   [id number] :  1960 ACCOUNT #:  [de-identified] ADMIT DATE:  2020 REASON FOR EVALUATION:  Hoarseness. HISTORY AND PHYSICAL ON ADMISSION:  The patient is a 34-year-old female who has had difficulties with significant hoarseness. She was noted to have bilateral polyposis, ongoing for at least years' time. The patient did have thyroid function test check which were within normal limits. She is a  at [employer], does have to utilize her voice for a significant amount. She is wishing this to be removed. The patient is a tobacco user. We have also discussed the possibility of speech therapy; however, the patient wants just to proceed with removal of the above. PAST MEDICAL HISTORY:  Significant for reflux disease, spinal stenosis, hypothyroidism, hypertension. PAST SURGICAL HISTORY:  Includes a hysterectomy and a tonsillectomy. REVIEW OF SYSTEMS:  Contributory for anxiety and depression as well as headaches and hearing loss. FAMILY HISTORY:  Noncontributory. SOCIAL HISTORY:  The patient smokes one and a half pack of cigarettes per day. Denies any significant alcohol intake. MEDICATION USE:   Includes Nexium, multivitamins, Chantix, Synthroid. REVIEW OF SYSTEMS:  Noncontributory. PHYSICAL EXAMINATION: 
GENERAL:  Reveals a well-developed, well-nourished 34-year-old female. VITAL SIGNS:  Height 5 feet 5, weight of 210. HEENT:  Ear exam reveals normal-appearing tympanic membranes. The oral cavity and  oropharynx are within normal limits. The intranasal exam reveals no evidence of any mucopurulent discharge. NECK:  Supple, nontender. No masses are palpable. CHEST:  Bilaterally clear. HEART:  S1, S2. No murmur audible. EXTREMITIES:  Within normal limits. IMPRESSION:  Hoarseness secondary to bilateral vocal cord polyposis. PLAN:  The patient will undergo removal of polyposis via suspension microlaryngoscopy with excision of lesion. Risks, benefits, and complications were discussed with the patient who understands and is aware. The patient will undergo the above procedure on 02/17/2020. Nj Ritchie MD 
 
 
[id number] 
D:  02/17/2020 6:55 
T:  02/17/2020 12:36 JOB #:  [id number]

## 2020-02-17 NOTE — ANESTHESIA POSTPROCEDURE EVALUATION
Procedure(s):  EXCISION OF VOCAL CORD POLYP.    general    Anesthesia Post Evaluation      Multimodal analgesia: multimodal analgesia used between 6 hours prior to anesthesia start to PACU discharge  Patient location during evaluation: bedside  Patient participation: complete - patient participated  Level of consciousness: awake  Pain management: adequate  Airway patency: patent  Anesthetic complications: no  Cardiovascular status: stable  Respiratory status: acceptable  Hydration status: acceptable  Post anesthesia nausea and vomiting:  controlled      Vitals Value Taken Time   /77 2/17/2020 11:19 AM   Temp 36.4 °C (97.5 °F) 2/17/2020 11:00 AM   Pulse 68 2/17/2020 11:26 AM   Resp 16 2/17/2020 11:26 AM   SpO2 93 % 2/17/2020 11:26 AM   Vitals shown include unvalidated device data.

## 2020-02-17 NOTE — ANESTHESIA PREPROCEDURE EVALUATION
Relevant Problems   No relevant active problems       Anesthetic History   No history of anesthetic complications            Review of Systems / Medical History  Patient summary reviewed and pertinent labs reviewed    Pulmonary          Smoker         Neuro/Psych   Within defined limits           Cardiovascular    Hypertension                   GI/Hepatic/Renal     GERD           Endo/Other      Hypothyroidism       Other Findings              Physical Exam    Airway  Mallampati: II  TM Distance: 4 - 6 cm  Neck ROM: normal range of motion   Mouth opening: Normal     Cardiovascular    Rhythm: regular  Rate: normal         Dental    Dentition: Edentulous     Pulmonary  Breath sounds clear to auscultation               Abdominal  GI exam deferred       Other Findings            Anesthetic Plan    ASA: 2  Anesthesia type: general          Induction: Intravenous  Anesthetic plan and risks discussed with: Patient

## 2020-02-19 NOTE — OP NOTES
[address] [place] [address]   OPERATIVE REPORT    Name:  Logan Dangelo  MR#:   [id number]  :  1960  ACCOUNT #:  [de-identified]  DATE OF SERVICE:  2020    PREOPERATIVE DIAGNOSIS:  Hoarseness. POSTOPERATIVE DIAGNOSIS:  Hoarseness. PROCEDURE PERFORMED:  Suspension microlaryngoscopy with removal of bilateral vocal cord polyposis. SURGEON:  Chantelle Gregory MD    ASSISTANT:  None. ANESTHESIA:  General endotracheal anesthesia. COMPLICATIONS:  None. DRAINS:  None. SPECIMENS REMOVED:  Sent to Pathology. IMPLANTS:  None. ESTIMATED BLOOD LOSS:  Less than 5 mL. OPERATIVE FINDINGS:  Bilateral vocal cord polyposis. OPERATION PERFORMED:  The patient was brought to the operating room, placed supine on the operating room table. General endotracheal anesthesia was given per anesthesiology department. Once the patient was sufficiently anesthetized, a Dedo laryngoscope was inserted in the patient's oral cavity. This was passed in through the oral cavity, oropharynx, hypopharynx, and laryngeal area. Once the larynx was completely visualized, the Lewy suspension system was attached to the laryngoscope and suspended from the nurses' Zhu stand. The operating microscope was then brought into view and used to visualize the larynx. The patient was noted to have significant polyposis noted bilaterally. With the use of micro-cup forceps as well as micro scissors and under visualization utilizing the microscope, the polyp on the right was completely removed. This was done with preservation of vocalis muscle. No difficulties encountered. A small flap of mucosa was preserved to wrap around the vocal cord itself. Once this was complete, a similar procedure was performed on the contralateral side. Bleeding was then controlled with tightly applied Hea-Wmyigdgzfu-egvgla pledgets which were packed into the laryngeal inlet. After about 10 minutes time, this was removed. No bleeding was seen. The patient was subsequently taken from the operating room to the recovery room in stable condition after correct needle and sponge count were obtained.       Louann Foster MD      [id number]  D:  02/18/2020 21:47  T:  02/18/2020 23:02  JOB #:  [id number]

## 2020-03-11 ENCOUNTER — HOSPITAL ENCOUNTER (OUTPATIENT)
Dept: PHYSICAL THERAPY | Age: 60
Discharge: HOME | End: 2020-03-11
Payer: MEDICAID

## 2020-03-11 PROCEDURE — 92522 EVALUATE SPEECH PRODUCTION: CPT

## 2020-03-11 PROCEDURE — 92507 TX SP LANG VOICE COMM INDIV: CPT

## 2020-03-11 NOTE — PROGRESS NOTES
[facility] [employer] [facility]  [address] [place] [address]  [phone] fax    Plan of Care/ Statement of Necessity for Speech Therapy Services    Patient name: Dayan Peter Start of Care: 3/11/2020   Referral source: Geovanny Lawrence MD : 1960    Medical Diagnosis: Dysphonia [R49.0]  Payor: BLUE CROSS MEDICAID / Plan: [employer] / Product Type: Managed Care Medicaid /  Onset Date:   exacerbation 2020   Treatment Diagnosis: Dysphonia [R49.0]   Prior Hospitalization: see medical history Provider#: [id number]   Medications: Verified on Patient summary List    Comorbidities: HTN; hypothyroid, hx of ovarian cancer     Prior Level of Function: hoarse vocal quality for ~1 year prior to VF polyposis removal procedure per pt     The Plan of Care and following information is based on the information from the initial evaluation. Assessment/ key information:   Pt is a 61 y/o female referred to O/P ST s/p bilateral vocal cord polyposis removal procedure completed 2020. Pt c/o low vocal volume, increased strain when speaking in a louder voice, constant breathy vocal quality, and throat pain. She reports heavy consumption of caffeinated beverages and limited water intake daily. She also reports frequent coughing and throat clearing. The pt is a current tobacco user, however reports significant reduction in daily intake recently (~2 cigarettes per day). Pt reports inadequate vocal volume impacts her ability to perform requirements of her occupation as a . A voice evaluation was completed today along with teaching session. Patient exhibited a moderately-severe dysphonia c/b vocal asthenia, breathy vocal quality, and phonation breaks during vocal and conversational tasks. Pt exhibited increased strain during vocal tasks. Pt spoke with a back throat focus and exhibited thoracic breathing during structured vocal tasks and conversation.  Pt exhibited laryngeal tension that was visible in her neck during vocal tasks. Her modal pitch was WNLs @ 220.00 Hz  for her age and gender. Her pitch range was significantly low at 12 semi-tones. She spoke with significantly reduced vocal intensity~ 48 dB during the evaluation. Pt was intermittently throat clearing during today's session. Pt reports frequently throat clearing and coughing throughout the day. Vowel prolongation /a/ (a measure of respiratory/phonatory efficiency): duration: 4 seconds with faded phonation;  pitch:  246.94 Hz, Intensity: 52 dB ; vocal quality: phonation breaks, excessively breathy, strained. Increased strain during vowel prolongations with excessive tension noted in throat and mandible. Vowel prolongation /i/ (ee) : duration: 3 seconds;  pitch: 277.18 Hz, Intensity: 50 dB  Vocal quality: breathy, strained, phonation breaks. Excessive tension observed in the neck, chest, mandible during the prolongation    Frequency (as measured by chromatic tuner and keyboard):  Pitch Range: 12 Semi-tones                    Lowest: 174.61 Hz              Highest:  349. 23Hz              Modal pitch in conversation: 220 Hz  Modal pitch in readin.63 Hz    Perceptual assessment: Consensus Auditory Perceptual Evaluation of Voice (CAPE-V) was administered by this SLP :On a scale of 0 to 100 with 100 equaling the most extreme deviance she attained the following scores: (see attached in paper chart): Overall Severity: 65/100 consistent; moderate-severely deviant, roughness: 45/100 intermittent moderately deviant , breathiness 75/100 consistent severely deviant; strain: 56/100 intermittent, moderately deviant; pitch: 12/100 intermittent, mildly deviant; loudness: (too soft)  74/100 consistent; severely deviant     Diagnostic tx was conducted today with the following technique: use of good diaphragmatic breathing, reduced tension,  and increased orality which produced a fairly improved vocal provided with increased vocal projection; pt was taught how to inhibit this behavior with a hard swallow/sip of water to reduce throat clearing/coughing to decrease vocal abuse. Instructions on ways to increase vocal hygiene. Based on the objective and subjective data above, pt presents with a moderately-severe dysphonia. It is recommended that Ms. Peter receive skilled speech therapy services as it is medically necessary to improve her voice for vocal ADL tasks related to home and community, independence in work environment, communicate effectively and for QOL. Problem List:    []aphasic  []dysarthric  []dysphagic       []alexic  []agraphic  [x]dysphonia       []dysfluency   []Cognitive-Linguistic Disorder       []other   Treatment Plan may include any combination of the following: Voice Treatment and Patient Education    Patient / Family readiness to learn indicated by: asking questions   Persons(s) to be included in education: patient  Barriers to Learning/Limitations: none   Patient Goal (s): Talk so people can hear me  Patient Self Reported Health Status: good  Rehabilitation Potential: good     Short Term Goals: To be accomplished in 4 weeks  1. Patient will demonstrate good vocal hygiene by  (a) decreasing coughing by ~50% (b) decreasing throat clearing by 50% (c) consume ~ 64 oz of water daily in order to decrease trauma/irritation to the vocal folds to help improve her voice. 2. Patient will use diaphragmatic/abdominal breath management with good connection of airflow to phonation for automatics, words, phrases and simple sentences with 80% accuracy with min-mod cues during structured phonation/speaking tasks to improve breath support.   3. Patient will demonstrate a more forward placement of tone, easy onsets of phonation and a legato speaking style with adequate loudness, decreased tension and improved pitch (higher) for automatics, words, phrases and short sentences with 80% accuracy with min-mod cues in order to improve her voice. 4. Patient will perform vocal fx exercises (such as Stemple's, vocal entrainment-pitch ex's) with modA in order to improve the balance between respiration, phonation and resonance and improve vocal strength and flexibility for improved vocal health and voice production. Long Term Goals: To be accomplished in 8  weeks   1. Patient will produce improved voice moderately-severe dysphonia to WNL's including decreased tension, increased facial focus, and easier voice and increased vocal stamina with a more appropriate pitch, adequate  loudness, and vocal quality 85% of the time for vocal ADL/IADL tasks as measured by objective measurements, SLP assessment and SLP/patient agreement for perceptual judgment. Frequency / Duration: Patient to be seen 2 times per week for 8 weeks:    Patient/ Caregiver education and instruction: Diagnosis, prognosis, vocal hygiene, diaphragmatic breath support, resonant voice, anatomy/physiology of voicing    Noe Mccarthy, SLP 3/11/2020 5:33 PM  MS CCC-SLP  Speech-Language Pathologist     ________________________________________________________________________    I certify that the above Therapy Services are being furnished while the patient is under my care. I agree with the treatment plan and certify that this therapy is necessary.     Physician's Signature:____________Date:_________TIME:________    ** Signature, Date and Time must be completed for valid certification **    Please sign and return to [facility]  JONN  [address] [place] [address]  [phone] fax     Thank you

## 2020-03-11 NOTE — PROGRESS NOTES
ST DAILY TREATMENT NOTE/VOICE EVALUATION    Patient Name: Dayan Peter  Date:3/11/2020  : 1960  [x]  Patient  Verified  Payor: BLUE CROSS MEDICAID / Plan: [facility] [employer] / Product Type: Managed Care Medicaid /   In time: 3:15  Out time: 4:25  Total Treatment Time (min): 70  Visit #: 1 of 16    SUBJECTIVE  Pain Level (0-10 scale): 6  Any medication changes, allergies to medications, adverse drug reactions, diagnosis change, or new procedure performed?: [x] No    [] Yes (see summary sheet for update)  Subjective functional status/changes:   [] No changes reported  Pt is a 61 y/o female referred to O/P ST s/p bilateral vocal cord polyposis removal procedure completed 2020. Pt c/o low vocal volume, increased strain when speaking in a louder voice, constant breathy vocal quality, and throat pain. She reports heavy consumption of caffeinated beverages and limited water intake daily. She also reports frequent coughing and throat clearing. The pt is a current tobacco user, however reports significant reduction in daily intake recently (~2 cigarettes per day). Pt reports inadequate vocal volume impacts her ability to perform requirements of her occupation as a .      Date of Onset: 2020  Social History: tobacco user,  at [employer]    Prior Functional level: hoarse vocal quality for ~1 year prior to VF polyposis removal procedure per pt. Prior WNLs    OBJECTIVE    OUTPATIENT VOICE EVALUATION    Description of Problem: s/p bilateral vocal cord polyposis removal procedure completed 2020.  Pt c/o low vocal volume, increased strain when speaking in a louder voice, constant breathy vocal quality, and throat pain    Onset of Problem: 2020  Variability through Day: c/o vocal fatigue  Voice Usage: above average   Know Abuse/Misuse: yelling/screaming, excessive throat clearing/coughing     Pitch:   [x] WNL  [] Low  [] High     Comments:  Loudness: [] WNL  [] Excessive [x] Inadequate     Comments:  Quality:  [] WNL      Comments: breathy, vocal asthenia, pitch breaks, intermittent strain   Resonance: [x] WNL  [] Hypernasal [] Hyponasal     Comments:  Rate:  [x] WNL  [] Fast  [] Slow     Comments:  Range:  [] WNL  [] Montone [x] Excessive variability    Comments:  Observations [x] Pitch Breaks [] Glottal Coe [] Stridency [] Hard Glottal Attacks    [] Aphonia [x] Diplophonia [x] Phonation Breaks     [x] Severe Fluctuations on Quality    []Other: breathy , vocal asthenia     [] Aphonia interspersed with intermittent phonation      Vowel prolongation /a/ (a measure of respiratory/phonatory efficiency): duration: 4 seconds with faded phonation;  pitch:  246.94 Hz, Intensity: 52 dB ; vocal quality: phonation breaks, excessively breathy, strained. Increased strain during vowel prolongations with excessive tension noted in throat and mandible. Vowel prolongation /i/ (ee) : duration: 3 seconds;  pitch: 277.18 Hz, Intensity: 50 dB  Vocal quality: breathy, strained, phonation breaks. Excessive tension observed in the neck, chest, mandible during the prolongation    Frequency (as measured by chromatic tuner and keyboard):  Pitch Range: 12 Semi-tones                    Lowest: 174.61 Hz              Highest:  349. 23Hz              Modal pitch in conversation: 220 Hz  Modal pitch in readin.63 Hz    Is breathing audible? [] Yes [x] No  Is phonation on inhalation? [] Yes [x] No  Is breathing pattern irregular? [] Yes [x] No  Does patient have difficulty sustaining expiration? [] Yes [x] No  Does patient focus on breathing? [] Yes [x] No  Clavicular breathing? [] Yes [x] No  Reduced respiratory/speech coordination? [x] Yes [] No    Frequent coughing? [x] Yes [] No   Frequent throat clearing? [x] Yes [] No   Extrinsic laryngeal tension? [x] Yes [] No  Visible tension present: [x]neck  [x] chest  [] mandible  Evidence of tight throat during phonation?  [x] Yes [] No  Complaints of tired throat? [x] Yes [] No  Tone focus: []retracted tongue  [] closed mouth   []appropriate [x] back throat focus  Perceptual assessment: Consensus Auditory Perceptual Evaluation of Voice (CAPE-V) was administered by this SLP :On a scale of 0 to 100 with 100 equaling the most extreme deviance she attained the following scores: (see attached in paper chart): Overall Severity: 65/100 consistent; moderate-severely deviant, roughness: 45/100 intermittent moderately deviant , breathiness 75/100 consistent severely deviant; strain: 56/100 intermittent, moderately deviant; pitch: 12/100 intermittent, mildly deviant; loudness: (too soft)  74/100 consistent; severely deviant     Diagnostic tx was conducted today with the following technique: use of good diaphragmatic breathing, reduced tension,  and increased orality which produced a fairly improved vocal provided with increased vocal projection; pt was taught how to inhibit this behavior with a hard swallow/sip of water to reduce throat clearing/coughing to decrease vocal abuse. Instructions on ways to increase vocal hygiene.      Speech:   Oral Verbal Apraxia: [x] None     [] Mild [] Moderate [] Severe   Dysarthria:  [x] None     [] Mild [] Moderate [] Severe   Intelligibility  [x] WNL      [] Words [] Phrases [] Sentences       [] Conversation  % intelligible:   Agrammatic:  [] Yes       [x] No  Hearing screened by:    Passed [] Yes [] No  Comments:   Fluency:  [x]WNL  []Dysfluent   Comments:  Motor Speech:  [x]Articulation WFL    []Apraxia  []oral []verbal  ( []min []mod []severe)    []Dysarthria    Intelligibility:  Deviations noted:  [x]none  []yes, describe:   Auditory Comprehension: [x] WFL [] Impaired - describe: (subjective)  Verbal Expression: [x] WFL [] Impaired - describe: (subjective)  Reading comprehension: [x] WFL [] Impaired - describe: (subjective)  Cognitive skills: [x] WFL [] Impaired - describe: (subjective)      Patient/Caregiver instruction/education: [] Review HEP    [] Progressed/Changed    HEP/Handouts given: diaphragmatic breathing with teaching and handout, vocal hygiene with skilled instruction /handout     Pain Level (0-10 scale) post treatment: 6    ASSESSMENT  [x]  See Plan of Care    Short Term Goals: To be accomplished in 4 weeks  [x]  See Plan of Care  Long Term Goals:  To be accomplished in 8 weeks   [x]  See Plan of Care  PLAN  [x]  Upgrade activities as tolerated     []  Continue plan of care  []  Discharge due to:__  [x] Other: tx initiated     Rhianna Hermosillo SLP 3/11/2020  3:15 PM  MS CCC-SLP  Speech-Language Pathologist

## 2020-03-18 NOTE — H&P
[address] [place] [address]   HISTORY AND PHYSICAL    Name:  Romeo Jones  MR#:   [id number]  :  1960  ACCOUNT #:  [de-identified]  ADMIT DATE:  2020    REASON FOR EVALUATION:  Hoarseness. HISTORY AND PHYSICAL ON ADMISSION:  The patient is a 59-year-old female who has had difficulties with significant hoarseness. She was noted to have bilateral polyposis, ongoing for at least years' time. The patient did have thyroid function test check which were within normal limits. She is a  at [employer], does have to utilize her voice for a significant amount. She is wishing this to be removed. The patient is a tobacco user. We have also discussed the possibility of speech therapy; however, the patient wants just to proceed with removal of the above. PAST MEDICAL HISTORY:  Significant for reflux disease, spinal stenosis, hypothyroidism, hypertension. PAST SURGICAL HISTORY:  Includes a hysterectomy and a tonsillectomy. REVIEW OF SYSTEMS:  Contributory for anxiety and depression as well as headaches and hearing loss. FAMILY HISTORY:  Noncontributory. SOCIAL HISTORY:  The patient smokes one and a half pack of cigarettes per day. Denies any significant alcohol intake. MEDICATION USE:   Includes Nexium, multivitamins, Chantix, Synthroid. REVIEW OF SYSTEMS:  Noncontributory. PHYSICAL EXAMINATION:  GENERAL:  Reveals a well-developed, well-nourished 59-year-old female. VITAL SIGNS:  Height 5 feet 5, weight of 210. HEENT:  Ear exam reveals normal-appearing tympanic membranes. The oral cavity and  oropharynx are within normal limits. The intranasal exam reveals no evidence of any mucopurulent discharge. NECK:  Supple, nontender. No masses are palpable. CHEST:  Bilaterally clear. HEART:  S1, S2. No murmur audible. EXTREMITIES:  Within normal limits. IMPRESSION:  Hoarseness secondary to bilateral vocal cord polyposis.     PLAN:  The patient will undergo removal of polyposis via suspension microlaryngoscopy with excision of lesion. Risks, benefits, and complications were discussed with the patient who understands and is aware. The patient will undergo the above procedure on 02/17/2020.       Eleazar Milner MD      [id number]  D:  02/17/2020 6:55  T:  02/17/2020 12:36  JOB #:  [id number]

## 2020-04-01 ENCOUNTER — TELEPHONE (OUTPATIENT)
Dept: PHYSICAL THERAPY | Age: 60
End: 2020-04-01

## 2020-07-22 ENCOUNTER — HOSPITAL ENCOUNTER (OUTPATIENT)
Dept: MAMMOGRAPHY | Age: 60
Discharge: HOME | End: 2020-07-22
Attending: FAMILY MEDICINE
Payer: MEDICAID

## 2020-07-22 DIAGNOSIS — Z12.31 VISIT FOR SCREENING MAMMOGRAM: ICD-10-CM

## 2020-07-22 PROCEDURE — 77063 BREAST TOMOSYNTHESIS BI: CPT

## 2020-08-13 NOTE — PROGRESS NOTES
[facility]  [address] [facility]  [phone] fax    Speech Therapy Discharge Summary    Patient name: Dayan Peter Start of Care: 3/11/2020   Referral source: Cora Rutledge MD : 1960               Medical Diagnosis: Dysphonia [R49.0]  Payor: BLUE CROSS MEDICAID / Plan: [address] [place] [address] / Product Type: Managed Care Medicaid /  Onset Date:   exacerbation 2020   Treatment Diagnosis: Dysphonia [R49.0]   Prior Hospitalization: see medical history Provider#: [id number]   Medications: Verified on Patient summary List    Comorbidities: HTN; hypothyroid, hx of ovarian cancer     Prior Level of Function: hoarse vocal quality for ~1 year prior to VF polyposis removal procedure per pt   Visits from Start of Care: 1   Missed Visits: 0    Reporting Period : 3/11/2020 to 3/11/2020    Summary of Care:  Goal: 1. Patient will demonstrate good vocal hygiene by  (a) decreasing coughing by ~50% (b) decreasing throat clearing by 50% (c) consume ~ 64 oz of water daily in order to decrease trauma/irritation to the vocal folds to help improve her voice. Status at last note/certification: initial evaluation yielding mod-severe dysphonia  Status at discharge: not met, no change in status - evaluation only     Goal: 2. Patient will use diaphragmatic/abdominal breath management with good connection of airflow to phonation for automatics, words, phrases and simple sentences with 80% accuracy with min-mod cues during structured phonation/speaking tasks to improve breath support. Status at last note/certification: initial evaluation yielding mod-severe dysphonia  Status at discharge: not met, no change in status - evaluation only     Goal: 3.  Patient will demonstrate a more forward placement of tone, easy onsets of phonation and a legato speaking style with adequate loudness, decreased tension and improved pitch (higher) for automatics, words, phrases and short sentences with 80% accuracy with min-mod cues in order to improve her voice.    Status at last note/certification: initial evaluation yielding mod-severe dysphonia  Status at discharge: not met, no change in status - evaluation only     Goal: 4. Patient will perform vocal fx exercises (such as Stemple's, vocal entrainment-pitch ex's) with modA in order to improve the balance between respiration, phonation and resonance and improve vocal strength and flexibility for improved vocal health and voice production. Status at last note/certification: initial evaluation yielding mod-severe dysphonia  Status at discharge:not met, no change in status - evaluation only       ASSESSMENT:   Patient completed evaluation for dysphonia. She did not return for follow ups. No change in status - evaluation only. Please re-consult if needed per MD discretion. RECOMMENDATIONS:  [x]Discontinue therapy: []Patient has reached or is progressing toward set goals      [x]Patient is non-compliant or has abdicated      []Due to lack of appreciable progress towards set ARABELLA Ordaz 8/13/2020 12:58 PM  MS CCC-SLP  Speech-Language Pathologist     NOTE TO PHYSICIAN:  Please complete the following and fax to: [facility] at [facility] at [phone]  . Retain this original for your records. If you are unable to process this request in   24 hours, please contact our office.      [] I have read the above report and request that my patient continue therapy with the following changes/special instructions:  [] I have read the above report and request that my patient be discharged from therapy    Physician's Signature:____________Date:_________TIME:________    ** Signature, Date and Time must be completed for valid certification **

## 2020-09-08 ENCOUNTER — HOSPITAL ENCOUNTER (OUTPATIENT)
Dept: CT IMAGING | Age: 60
Discharge: HOME | End: 2020-09-08
Attending: FAMILY MEDICINE
Payer: MEDICAID

## 2020-09-08 DIAGNOSIS — R91.1 LUNG NODULE: ICD-10-CM

## 2020-09-08 PROCEDURE — 71250 CT THORAX DX C-: CPT

## 2020-11-12 ENCOUNTER — TRANSCRIBE ORDER (OUTPATIENT)
Dept: SCHEDULING | Age: 60
End: 2020-11-12

## 2020-11-12 ENCOUNTER — TELEPHONE (OUTPATIENT)
Dept: SURGERY | Age: 60
End: 2020-11-12

## 2020-11-12 DIAGNOSIS — J44.9 OBSTRUCTIVE CHRONIC BRONCHITIS WITHOUT EXACERBATION (HCC): Primary | ICD-10-CM

## 2020-11-12 DIAGNOSIS — R91.1 LUNG NODULE: ICD-10-CM

## 2020-11-12 NOTE — TELEPHONE ENCOUNTER
Called pt mobile number and left a message for her to call our office to schedule a consult with Dr. Bella Baker.     DX: Gallstones  Referred by Dr. Bj Rahman

## 2021-04-26 ENCOUNTER — TRANSCRIBE ORDER (OUTPATIENT)
Dept: REGISTRATION | Age: 61
End: 2021-04-26

## 2021-04-26 ENCOUNTER — HOSPITAL ENCOUNTER (OUTPATIENT)
Dept: GENERAL RADIOLOGY | Age: 61
Discharge: HOME | End: 2021-04-26
Payer: MEDICAID

## 2021-04-26 DIAGNOSIS — R52 PAIN: ICD-10-CM

## 2021-04-26 DIAGNOSIS — M79.676 TOE PAIN: ICD-10-CM

## 2021-04-26 DIAGNOSIS — R52 PAIN: Primary | ICD-10-CM

## 2021-04-26 PROCEDURE — 73620 X-RAY EXAM OF FOOT: CPT

## 2021-05-17 ENCOUNTER — HOSPITAL ENCOUNTER (OUTPATIENT)
Dept: GENERAL RADIOLOGY | Age: 61
Discharge: HOME | End: 2021-05-17
Payer: MEDICAID

## 2021-05-17 ENCOUNTER — TRANSCRIBE ORDER (OUTPATIENT)
Dept: REGISTRATION | Age: 61
End: 2021-05-17

## 2021-05-17 DIAGNOSIS — S92.502S CLOSED FRACTURE OF PHALANX OF LEFT SECOND TOE, SEQUELA: Primary | ICD-10-CM

## 2021-05-17 DIAGNOSIS — S92.502S CLOSED FRACTURE OF PHALANX OF LEFT SECOND TOE, SEQUELA: ICD-10-CM

## 2021-05-17 PROCEDURE — 73660 X-RAY EXAM OF TOE(S): CPT

## 2021-05-17 PROCEDURE — 73630 X-RAY EXAM OF FOOT: CPT

## 2021-07-07 ENCOUNTER — TRANSCRIBE ORDER (OUTPATIENT)
Dept: SCHEDULING | Age: 61
End: 2021-07-07

## 2021-07-07 DIAGNOSIS — Z13.820 SPECIAL SCREENING FOR OSTEOPOROSIS: Primary | ICD-10-CM

## 2021-07-07 DIAGNOSIS — Z12.31 VISIT FOR SCREENING MAMMOGRAM: Primary | ICD-10-CM

## 2021-07-07 DIAGNOSIS — M81.0 SENILE OSTEOPOROSIS: ICD-10-CM

## 2021-07-11 ENCOUNTER — APPOINTMENT (OUTPATIENT)
Dept: GENERAL RADIOLOGY | Age: 61
End: 2021-07-11
Attending: EMERGENCY MEDICINE
Payer: MEDICAID

## 2021-07-11 ENCOUNTER — HOSPITAL ENCOUNTER (OUTPATIENT)
Age: 61
Setting detail: OBSERVATION
Discharge: HOME | End: 2021-07-13
Attending: EMERGENCY MEDICINE | Admitting: FAMILY MEDICINE
Payer: MEDICAID

## 2021-07-11 ENCOUNTER — APPOINTMENT (OUTPATIENT)
Dept: CT IMAGING | Age: 61
End: 2021-07-11
Attending: EMERGENCY MEDICINE
Payer: MEDICAID

## 2021-07-11 DIAGNOSIS — R09.02 HYPOXIA: ICD-10-CM

## 2021-07-11 DIAGNOSIS — N39.0 URINARY TRACT INFECTION WITHOUT HEMATURIA, SITE UNSPECIFIED: ICD-10-CM

## 2021-07-11 DIAGNOSIS — U07.1 COVID-19: Primary | ICD-10-CM

## 2021-07-11 PROBLEM — J96.91 RESPIRATORY FAILURE WITH HYPOXIA (HCC): Status: ACTIVE | Noted: 2021-07-11

## 2021-07-11 LAB
ALBUMIN SERPL-MCNC: 3.6 G/DL (ref 3.4–5)
ALBUMIN/GLOB SERPL: 1 {RATIO} (ref 0.8–1.7)
ALP SERPL-CCNC: 102 U/L (ref 45–117)
ALT SERPL-CCNC: 16 U/L (ref 13–56)
ANION GAP SERPL CALC-SCNC: 8 MMOL/L (ref 3–18)
APPEARANCE UR: CLEAR
AST SERPL-CCNC: 14 U/L (ref 10–38)
B PERT DNA SPEC QL NAA+PROBE: NOT DETECTED
BACTERIA URNS QL MICRO: ABNORMAL /HPF
BASOPHILS # BLD: 0 K/UL (ref 0–0.1)
BASOPHILS NFR BLD: 0 % (ref 0–2)
BILIRUB SERPL-MCNC: 0.6 MG/DL (ref 0.2–1)
BILIRUB UR QL: NEGATIVE
BORDETELLA PARAPERTUSSIS PCR, BORPAR: NOT DETECTED
BUN SERPL-MCNC: 14 MG/DL (ref 7–18)
BUN/CREAT SERPL: 25 (ref 12–20)
C PNEUM DNA SPEC QL NAA+PROBE: NOT DETECTED
CALCIUM SERPL-MCNC: 8.4 MG/DL (ref 8.5–10.1)
CHLORIDE SERPL-SCNC: 110 MMOL/L (ref 100–111)
CO2 SERPL-SCNC: 24 MMOL/L (ref 21–32)
COLOR UR: YELLOW
CREAT SERPL-MCNC: 0.56 MG/DL (ref 0.6–1.3)
CRP SERPL-MCNC: 1.5 MG/DL (ref 0–0.3)
D DIMER PPP FEU-MCNC: 0.56 UG/ML(FEU)
DIFFERENTIAL METHOD BLD: ABNORMAL
EOSINOPHIL # BLD: 0 K/UL (ref 0–0.4)
EOSINOPHIL NFR BLD: 0 % (ref 0–5)
EPITH CASTS URNS QL MICRO: ABNORMAL /LPF (ref 0–5)
ERYTHROCYTE [DISTWIDTH] IN BLOOD BY AUTOMATED COUNT: 13.3 % (ref 11.6–14.5)
FLUAV SUBTYP SPEC NAA+PROBE: NOT DETECTED
FLUBV RNA SPEC QL NAA+PROBE: NOT DETECTED
GLOBULIN SER CALC-MCNC: 3.5 G/DL (ref 2–4)
GLUCOSE SERPL-MCNC: 109 MG/DL (ref 74–99)
GLUCOSE UR STRIP.AUTO-MCNC: NEGATIVE MG/DL
HADV DNA SPEC QL NAA+PROBE: NOT DETECTED
HCOV 229E RNA SPEC QL NAA+PROBE: NOT DETECTED
HCOV HKU1 RNA SPEC QL NAA+PROBE: NOT DETECTED
HCOV NL63 RNA SPEC QL NAA+PROBE: NOT DETECTED
HCOV OC43 RNA SPEC QL NAA+PROBE: NOT DETECTED
HCT VFR BLD AUTO: 39.2 % (ref 35–45)
HGB BLD-MCNC: 13.3 G/DL (ref 12–16)
HGB UR QL STRIP: NEGATIVE
HMPV RNA SPEC QL NAA+PROBE: NOT DETECTED
HPIV1 RNA SPEC QL NAA+PROBE: NOT DETECTED
HPIV2 RNA SPEC QL NAA+PROBE: NOT DETECTED
HPIV3 RNA SPEC QL NAA+PROBE: NOT DETECTED
HPIV4 RNA SPEC QL NAA+PROBE: NOT DETECTED
KETONES UR QL STRIP.AUTO: NEGATIVE MG/DL
LEUKOCYTE ESTERASE UR QL STRIP.AUTO: ABNORMAL
LYMPHOCYTES # BLD: 1.5 K/UL (ref 0.9–3.6)
LYMPHOCYTES NFR BLD: 29 % (ref 21–52)
M PNEUMO DNA SPEC QL NAA+PROBE: NOT DETECTED
MCH RBC QN AUTO: 32.5 PG (ref 24–34)
MCHC RBC AUTO-ENTMCNC: 33.9 G/DL (ref 31–37)
MCV RBC AUTO: 95.8 FL (ref 74–97)
MONOCYTES # BLD: 0.8 K/UL (ref 0.05–1.2)
MONOCYTES NFR BLD: 16 % (ref 3–10)
MUCOUS THREADS URNS QL MICRO: ABNORMAL /LPF
NEUTS BAND NFR BLD MANUAL: 8 % (ref 0–5)
NEUTS SEG # BLD: 2.7 K/UL (ref 1.8–8)
NEUTS SEG NFR BLD: 47 % (ref 40–73)
NITRITE UR QL STRIP.AUTO: POSITIVE
PH UR STRIP: 6 [PH] (ref 5–8)
PLATELET # BLD AUTO: 117 K/UL (ref 135–420)
PLATELET COMMENTS,PCOM: ABNORMAL
PMV BLD AUTO: 13.3 FL (ref 9.2–11.8)
POTASSIUM SERPL-SCNC: 3.4 MMOL/L (ref 3.5–5.5)
PROCALCITONIN SERPL-MCNC: <0.05 NG/ML
PROT SERPL-MCNC: 7.1 G/DL (ref 6.4–8.2)
PROT UR STRIP-MCNC: NEGATIVE MG/DL
RBC # BLD AUTO: 4.09 M/UL (ref 4.2–5.3)
RBC #/AREA URNS HPF: ABNORMAL /HPF (ref 0–5)
RBC MORPH BLD: ABNORMAL
RSV RNA SPEC QL NAA+PROBE: NOT DETECTED
RV+EV RNA SPEC QL NAA+PROBE: NOT DETECTED
SARS-COV-2 PCR, COVPCR: DETECTED
SODIUM SERPL-SCNC: 142 MMOL/L (ref 136–145)
SP GR UR REFRACTOMETRY: 1.01 (ref 1–1.03)
UROBILINOGEN UR QL STRIP.AUTO: 0.2 EU/DL (ref 0.2–1)
WBC # BLD AUTO: 5 K/UL (ref 4.6–13.2)
WBC URNS QL MICRO: ABNORMAL /HPF (ref 0–4)

## 2021-07-11 PROCEDURE — 85025 COMPLETE CBC W/AUTO DIFF WBC: CPT

## 2021-07-11 PROCEDURE — 85379 FIBRIN DEGRADATION QUANT: CPT

## 2021-07-11 PROCEDURE — 96375 TX/PRO/DX INJ NEW DRUG ADDON: CPT

## 2021-07-11 PROCEDURE — 87186 SC STD MICRODIL/AGAR DIL: CPT

## 2021-07-11 PROCEDURE — 80053 COMPREHEN METABOLIC PANEL: CPT

## 2021-07-11 PROCEDURE — 74011000258 HC RX REV CODE- 258: Performed by: INTERNAL MEDICINE

## 2021-07-11 PROCEDURE — 81001 URINALYSIS AUTO W/SCOPE: CPT

## 2021-07-11 PROCEDURE — 99218 HC RM OBSERVATION: CPT

## 2021-07-11 PROCEDURE — 74011250636 HC RX REV CODE- 250/636: Performed by: FAMILY MEDICINE

## 2021-07-11 PROCEDURE — 74011250636 HC RX REV CODE- 250/636: Performed by: EMERGENCY MEDICINE

## 2021-07-11 PROCEDURE — 87040 BLOOD CULTURE FOR BACTERIA: CPT

## 2021-07-11 PROCEDURE — 87077 CULTURE AEROBIC IDENTIFY: CPT

## 2021-07-11 PROCEDURE — 70486 CT MAXILLOFACIAL W/O DYE: CPT

## 2021-07-11 PROCEDURE — 74011000250 HC RX REV CODE- 250: Performed by: INTERNAL MEDICINE

## 2021-07-11 PROCEDURE — 74011250637 HC RX REV CODE- 250/637: Performed by: FAMILY MEDICINE

## 2021-07-11 PROCEDURE — 71045 X-RAY EXAM CHEST 1 VIEW: CPT

## 2021-07-11 PROCEDURE — 2709999900 HC NON-CHARGEABLE SUPPLY

## 2021-07-11 PROCEDURE — 87086 URINE CULTURE/COLONY COUNT: CPT

## 2021-07-11 PROCEDURE — 0202U NFCT DS 22 TRGT SARS-COV-2: CPT

## 2021-07-11 PROCEDURE — 74011000636 HC RX REV CODE- 636: Performed by: EMERGENCY MEDICINE

## 2021-07-11 PROCEDURE — 96376 TX/PRO/DX INJ SAME DRUG ADON: CPT

## 2021-07-11 PROCEDURE — 70450 CT HEAD/BRAIN W/O DYE: CPT

## 2021-07-11 PROCEDURE — 84145 PROCALCITONIN (PCT): CPT

## 2021-07-11 PROCEDURE — 71275 CT ANGIOGRAPHY CHEST: CPT

## 2021-07-11 PROCEDURE — 65660000000 HC RM CCU STEPDOWN

## 2021-07-11 PROCEDURE — 96365 THER/PROPH/DIAG IV INF INIT: CPT

## 2021-07-11 PROCEDURE — 96372 THER/PROPH/DIAG INJ SC/IM: CPT

## 2021-07-11 PROCEDURE — 74011250637 HC RX REV CODE- 250/637: Performed by: EMERGENCY MEDICINE

## 2021-07-11 PROCEDURE — 99285 EMERGENCY DEPT VISIT HI MDM: CPT

## 2021-07-11 PROCEDURE — 86140 C-REACTIVE PROTEIN: CPT

## 2021-07-11 PROCEDURE — 74011000250 HC RX REV CODE- 250: Performed by: EMERGENCY MEDICINE

## 2021-07-11 RX ORDER — POTASSIUM CHLORIDE 20 MEQ/1
20 TABLET, EXTENDED RELEASE ORAL
Status: COMPLETED | OUTPATIENT
Start: 2021-07-11 | End: 2021-07-11

## 2021-07-11 RX ORDER — ENOXAPARIN SODIUM 100 MG/ML
40 INJECTION SUBCUTANEOUS EVERY 24 HOURS
Status: DISCONTINUED | OUTPATIENT
Start: 2021-07-12 | End: 2021-07-13 | Stop reason: HOSPADM

## 2021-07-11 RX ORDER — SERTRALINE HYDROCHLORIDE 25 MG/1
25 TABLET, FILM COATED ORAL DAILY
Status: DISCONTINUED | OUTPATIENT
Start: 2021-07-12 | End: 2021-07-13 | Stop reason: HOSPADM

## 2021-07-11 RX ORDER — FAMOTIDINE 20 MG/1
20 TABLET, FILM COATED ORAL 2 TIMES DAILY
Status: DISCONTINUED | OUTPATIENT
Start: 2021-07-12 | End: 2021-07-13 | Stop reason: HOSPADM

## 2021-07-11 RX ORDER — GUAIFENESIN 100 MG/5ML
81 LIQUID (ML) ORAL DAILY
COMMUNITY

## 2021-07-11 RX ORDER — IBUPROFEN 600 MG/1
600 TABLET ORAL
Status: COMPLETED | OUTPATIENT
Start: 2021-07-11 | End: 2021-07-11

## 2021-07-11 RX ORDER — MELATONIN
2000 DAILY
Status: DISCONTINUED | OUTPATIENT
Start: 2021-07-12 | End: 2021-07-13 | Stop reason: HOSPADM

## 2021-07-11 RX ORDER — ACETAMINOPHEN 500 MG
1000 TABLET ORAL
Status: DISCONTINUED | OUTPATIENT
Start: 2021-07-11 | End: 2021-07-11

## 2021-07-11 RX ORDER — GUAIFENESIN 100 MG/5ML
81 LIQUID (ML) ORAL DAILY
Status: DISCONTINUED | OUTPATIENT
Start: 2021-07-12 | End: 2021-07-13 | Stop reason: HOSPADM

## 2021-07-11 RX ORDER — CEFTRIAXONE 1 G/1
1 INJECTION, POWDER, FOR SOLUTION INTRAMUSCULAR; INTRAVENOUS
Status: COMPLETED | OUTPATIENT
Start: 2021-07-11 | End: 2021-07-11

## 2021-07-11 RX ORDER — ATORVASTATIN CALCIUM 20 MG/1
20 TABLET, FILM COATED ORAL
Status: DISCONTINUED | OUTPATIENT
Start: 2021-07-11 | End: 2021-07-13 | Stop reason: HOSPADM

## 2021-07-11 RX ORDER — ACETAMINOPHEN 500 MG
1000 TABLET ORAL
Status: COMPLETED | OUTPATIENT
Start: 2021-07-11 | End: 2021-07-11

## 2021-07-11 RX ORDER — DEXAMETHASONE SODIUM PHOSPHATE 4 MG/ML
6 INJECTION, SOLUTION INTRA-ARTICULAR; INTRALESIONAL; INTRAMUSCULAR; INTRAVENOUS; SOFT TISSUE EVERY 24 HOURS
Status: DISCONTINUED | OUTPATIENT
Start: 2021-07-11 | End: 2021-07-13 | Stop reason: HOSPADM

## 2021-07-11 RX ADMIN — REMDESIVIR 200 MG: 100 INJECTION, POWDER, LYOPHILIZED, FOR SOLUTION INTRAVENOUS at 15:20

## 2021-07-11 RX ADMIN — ENOXAPARIN SODIUM 40 MG: 40 INJECTION SUBCUTANEOUS at 23:31

## 2021-07-11 RX ADMIN — ACETAMINOPHEN 1000 MG: 500 TABLET ORAL at 19:53

## 2021-07-11 RX ADMIN — DEXAMETHASONE SODIUM PHOSPHATE 6 MG: 4 INJECTION, SOLUTION INTRAMUSCULAR; INTRAVENOUS at 13:29

## 2021-07-11 RX ADMIN — WATER 1 G: 1 INJECTION INTRAMUSCULAR; INTRAVENOUS; SUBCUTANEOUS at 13:29

## 2021-07-11 RX ADMIN — SODIUM CHLORIDE 1000 ML: 900 INJECTION, SOLUTION INTRAVENOUS at 08:10

## 2021-07-11 RX ADMIN — POTASSIUM CHLORIDE 20 MEQ: 1500 TABLET, EXTENDED RELEASE ORAL at 09:44

## 2021-07-11 RX ADMIN — CEFTRIAXONE 1 G: 1 INJECTION, POWDER, FOR SOLUTION INTRAMUSCULAR; INTRAVENOUS at 09:44

## 2021-07-11 RX ADMIN — IOPAMIDOL 80 ML: 755 INJECTION, SOLUTION INTRAVENOUS at 10:45

## 2021-07-11 RX ADMIN — AZITHROMYCIN 500 MG: 500 INJECTION, POWDER, LYOPHILIZED, FOR SOLUTION INTRAVENOUS at 09:53

## 2021-07-11 RX ADMIN — ATORVASTATIN CALCIUM 20 MG: 20 TABLET, FILM COATED ORAL at 23:31

## 2021-07-11 RX ADMIN — IBUPROFEN 600 MG: 600 TABLET, FILM COATED ORAL at 08:10

## 2021-07-11 NOTE — ED NOTES
Pt resting in the bed comfortably at this time. Pt has the call bell within reach. Pt has no complaints at this time.

## 2021-07-11 NOTE — ED TRIAGE NOTES
Pt complains of a headache, congestion, runny nose, & fever starting about a week ago. Pt states she has taken tylenol for the headache with mild relief.

## 2021-07-11 NOTE — ED NOTES
Dinner tray given to pt. Pt is resting on hospital bed.  Continues to wait for bed assignment at [facility]

## 2021-07-11 NOTE — ED PROVIDER NOTES
EMERGENCY DEPARTMENT HISTORY AND PHYSICAL EXAM    7:52 AM  Date: 7/11/2021  Patient Name: Dayan Peter    History of Presenting Illness       History Provided By:     HPI: Dayan Stinson is a 57 y.o. female with past medical history of bronchitis, thyroid disease  presents with 1 week of subjective fever congestion, runny nose, cough, headache for a week. Patient has mild intermittent shortness of breath, no associated symptoms or modifying factors. Cough is productive with yellow sputum. Headache is mild in severity, frontal, intermittent, improved with Tylenol. Patient had some episodes of diarrhea. No nausea, vomiting. No abdominal pain. Patient states that she has some intermittent hesitancy in urination patient is a  at [employer]. Patient has not received Covid vaccine.      PCP: Alexa Enriquez MD    Past History     Past Medical History:  Past Medical History:   Diagnosis Date    [facility] [address]     Ovarian cancer ([facility] [address] [facility]     Thyroid disease        Past Surgical History:  Past Surgical History:   Procedure Laterality Date    COLONOSCOPY N/A 5/21/2019    COLONOSCOPY, SCREENING with hot snare polypectomy, w/ Bx performed by Frannie Del Rosario MD at [address] [place] [address] HX HYSTERECTOMY         Family History:  Family History   Problem Relation Age of Onset    Thyroid Disease Mother     Breast Cancer Mother     Cancer Brother 24        brain    Cancer Maternal Aunt         lung    Breast Problems Maternal Aunt 29    Breast Cancer Maternal Aunt     Cancer Paternal Aunt     Cancer Paternal Uncle     Cancer Maternal Grandmother     Breast Cancer Maternal Grandmother     Cancer Maternal Grandfather     Cancer Other         child leukemia    Heart Attack Other        Social History:  Social History     Tobacco Use    Smoking status: Current Every Day Smoker    Smokeless tobacco: Never Used    Tobacco comment: smokes one cigarette day   Substance Use Topics    Alcohol use: No    Drug use: No       Allergies: Allergies   Allergen Reactions    Latex Hives    Morphine Anaphylaxis    Adhesive Hives    Seafood Hives and Nausea Only     Shellfish       Review of Systems   Review of Systems   Constitutional: Positive for fatigue and fever. Negative for activity change, appetite change and chills. HENT: Negative for congestion, ear discharge, ear pain and sore throat. Eyes: Negative for photophobia and pain. Respiratory: Positive for cough and shortness of breath. Negative for choking. Cardiovascular: Negative for palpitations and leg swelling. Gastrointestinal: Negative for anal bleeding and rectal pain. Endocrine: Negative for polydipsia and polyuria. Genitourinary: Negative for genital sores and urgency. Musculoskeletal: Negative for arthralgias and myalgias. Neurological: Negative for dizziness, seizures and speech difficulty. Psychiatric/Behavioral: Negative for hallucinations, self-injury and suicidal ideas. Physical Exam     Patient Vitals for the past 12 hrs:   Temp Pulse Resp BP SpO2   07/11/21 1000 -- 68 19 -- 98 %   07/11/21 0945 -- 70 18 -- 94 %   07/11/21 0930 -- 68 23 125/72 91 %   07/11/21 0915 -- 68 23 134/70 94 %   07/11/21 0902 98.8 °F (37.1 °C) -- -- -- --   07/11/21 0900 -- 70 23 127/70 92 %   07/11/21 0848 -- 73 22 -- 92 %   07/11/21 0845 -- 72 24 -- 92 %   07/11/21 0842 -- 73 20 -- 96 %   07/11/21 0836 -- 71 24 -- 92 %   07/11/21 0830 -- 73 24 -- 93 %   07/11/21 0824 -- 75 24 -- 93 %   07/11/21 0818 -- 78 22 -- 94 %   07/11/21 0812 -- 78 20 -- 96 %   07/11/21 0806 -- -- -- -- 95 %   07/11/21 0800 -- -- -- 139/75 94 %   07/11/21 0754 100.2 °F (37.9 °C) 86 18 -- 94 %       Physical Exam  Vitals and nursing note reviewed. Constitutional:       Appearance: She is well-developed. HENT:      Head: Normocephalic and atraumatic. Eyes:      General:         Right eye: No discharge. Left eye: No discharge.    Cardiovascular: Rate and Rhythm: Normal rate and regular rhythm. Heart sounds: Normal heart sounds. No murmur heard. Pulmonary:      Effort: Pulmonary effort is normal. No respiratory distress. Breath sounds: Normal breath sounds. No stridor. No wheezing or rales. Comments: Decreased breath sounds bilaterally  Chest:      Chest wall: No tenderness. Abdominal:      General: Bowel sounds are normal. There is no distension. Palpations: Abdomen is soft. Tenderness: There is no abdominal tenderness. There is no guarding or rebound. Musculoskeletal:         General: Normal range of motion. Cervical back: Normal range of motion and neck supple. Skin:     General: Skin is warm and dry. Neurological:      Mental Status: She is alert and oriented to person, place, and time. Diagnostic Study Results     Labs -  Recent Results (from the past 12 hour(s))   CBC WITH AUTOMATED DIFF    Collection Time: 07/11/21  8:07 AM   Result Value Ref Range    WBC 5.0 4.6 - 13.2 K/uL    RBC 4.09 (L) 4.20 - 5.30 M/uL    HGB 13.3 12.0 - 16.0 g/dL    HCT 39.2 35.0 - 45.0 %    MCV 95.8 74.0 - 97.0 FL    MCH 32.5 24.0 - 34.0 PG    MCHC 33.9 31.0 - 37.0 g/dL    RDW 13.3 11.6 - 14.5 %    PLATELET 300 (L) 814 - 420 K/uL    MPV 13.3 (H) 9.2 - 11.8 FL    NEUTROPHILS 47 40 - 73 %    BAND NEUTROPHILS 8 (H) 0 - 5 %    LYMPHOCYTES 29 21 - 52 %    MONOCYTES 16 (H) 3 - 10 %    EOSINOPHILS 0 0 - 5 %    BASOPHILS 0 0 - 2 %    ABS. NEUTROPHILS 2.7 1.8 - 8.0 K/UL    ABS. LYMPHOCYTES 1.5 0.9 - 3.6 K/UL    ABS. MONOCYTES 0.8 0.05 - 1.2 K/UL    ABS. EOSINOPHILS 0.0 0.0 - 0.4 K/UL    ABS.  BASOPHILS 0.0 0.0 - 0.1 K/UL    DF MANUAL      PLATELET COMMENTS DECREASED PLATELETS      RBC COMMENTS NORMOCYTIC, NORMOCHROMIC     METABOLIC PANEL, COMPREHENSIVE    Collection Time: 07/11/21  8:07 AM   Result Value Ref Range    Sodium 142 136 - 145 mmol/L    Potassium 3.4 (L) 3.5 - 5.5 mmol/L    Chloride 110 100 - 111 mmol/L    CO2 24 21 - 32 mmol/L Anion gap 8 3.0 - 18 mmol/L    Glucose 109 (H) 74 - 99 mg/dL    BUN 14 7.0 - 18 MG/DL    Creatinine 0.56 (L) 0.6 - 1.3 MG/DL    BUN/Creatinine ratio 25 (H) 12 - 20      GFR est AA >60 >60 ml/min/1.73m2    GFR est non-AA >60 >60 ml/min/1.73m2    Calcium 8.4 (L) 8.5 - 10.1 MG/DL    Bilirubin, total 0.6 0.2 - 1.0 MG/DL    ALT (SGPT) 16 13 - 56 U/L    AST (SGOT) 14 10 - 38 U/L    Alk. phosphatase 102 45 - 117 U/L    Protein, total 7.1 6.4 - 8.2 g/dL    Albumin 3.6 3.4 - 5.0 g/dL    Globulin 3.5 2.0 - 4.0 g/dL    A-G Ratio 1.0 0.8 - 1.7     URINALYSIS W/ RFLX MICROSCOPIC    Collection Time: 07/11/21  9:55 AM   Result Value Ref Range    Color YELLOW      Appearance CLEAR      Specific gravity 1.014 1.005 - 1.030      pH (UA) 6.0 5.0 - 8.0      Protein Negative NEG mg/dL    Glucose Negative NEG mg/dL    Ketone Negative NEG mg/dL    Bilirubin Negative NEG      Blood Negative NEG      Urobilinogen 0.2 0.2 - 1.0 EU/dL    Nitrites Positive (A) NEG      Leukocyte Esterase SMALL (A) NEG     URINE MICROSCOPIC ONLY    Collection Time: 07/11/21  9:55 AM   Result Value Ref Range    WBC 11 to 20 0 - 4 /hpf    RBC NONE 0 - 5 /hpf    Epithelial cells 2+ 0 - 5 /lpf    Bacteria 4+ (A) NEG /hpf    Mucus FEW (A) NEG /lpf       Radiologic Studies -   CT HEAD WO CONT    Result Date: 7/11/2021  1. No evidence of acute intracranial abnormality. 2. Mild mucosal thickening of bilateral ethmoid air cells and sphenoid sinuses with a few frothy secretions in the right saphenous sinus. 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 CT MAXILLOFACIAL WO CONT    Result Date: 7/11/2021  There is mild mucosal thickening throughout majority of the sinuses with a few frothy secretions in the right sphenoid sinus. 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 XR CHEST PORT    Result Date: 7/11/2021  1. Unchanged reticular opacities at the left lung base, which may reflect atelectasis or scarring. Thank you for enabling us to participate in the care of this patient. Medical Decision Making     ED Course: Progress Notes, Reevaluation, and Consults:    7:52 AM Initial assessment performed. The patients presenting problems have been discussed, and they/their family are in agreement with the care plan formulated and outlined with them. I have encouraged them to ask questions as they arise throughout their visit. Provider Notes (Medical Decision Making):   Patient presents with cough, congestion, subjective fever, headache. Temperature 100.2 in the ED  Patient not tachypneic intermittently saturation dropped to 90-92%  Plan to obtain labs, imaging  We are going to obtain x-ray to rule out pneumonia  I suspect viral syndrome possible Covid  Patient will be tested  Old medical records reviewed:  Labs as interpreted by me:  No leukocytosis, however 8% bands potassium 3.4 potassium will be repleted  No evidence of PE  X-ray chest unchanged reticular opacities at the left lung base may reflect atelectasis or scarring  Patient gets intermittently hypoxic 90-91% placed on 2L of oxygen  Given the increased bands discussed with Dr. Claudia Mojica who recommended respiratory panel and CTA  Patient also has a UTI  Patient received multiple reassessments  No CT evidence of acute pulmonary embolism. CT maxillofacial findings consistent with sinusitis   As per Dr. Claudia Mojica patient to start on remdesivir and decadron  Mild burden of bilateral subpleural patchy and nodular groundglass densities,  likely infectious/inflammatory in etiology, including that which can be seen in the setting of COVID-19. Multiple stable pulmonary nodules measuring up to 3 mm, favoring benign etiology     Patient will be admitted for hypoxic respiratory failure due to Covid-19, UTI.  Discussed with Dr. Riky Langford, who accepted admission    Critical Care:  CRITICAL CARE:  2:03 PM  I have spent 45 minutes of critical care time involved in lab review, consultations with specialist, family decision-making, and documentation. This time does not include separately billable procedures. During this entire length of time I was immediately available to the patient. Critical Care: The reason for providing this level of medical care for this critically ill patient was due a critical illness that impaired one or more vital organ systems such that there was a high probability of imminent or life threatening deterioration in the patients condition. This care involved high complexity decision making to assess, manipulate, and support vital system functions, to treat this degreee vital organ system failure and to prevent further life threatening deterioration of the patients condition. Vital Signs-Reviewed the patient's vital signs. Reviewed pt's pulse ox reading. Records Reviewed: old medical records  -I am the first provider for this patient.  -I reviewed the vital signs, available nursing notes, past medical history, past surgical history, family history and social history. Current Outpatient Medications   Medication Sig Dispense Refill    aspirin 81 mg chewable tablet Take 81 mg by mouth daily.  calcium-cholecalciferol, d3, (CALCIUM 600 + D) 600-125 mg-unit tab Take  by mouth daily.  cyanocobalamin (VITAMIN B-12) 500 mcg tablet Take 500 mcg by mouth daily.  fluticasone-umeclidinium-vilanterol (TRELEGY ELLIPTA) 100-62.5-25 mcg inhaler Take 1 Puff by inhalation daily. Pt will stop 48 hrs prior to surgery      mv-min-vit C-Glu-Tawana [id number] (AIRBORNE, WITH LYSINE ACETATE,) 250-12.5 mg chew Take 2 Tabs by mouth.  levothyroxine (SYNTHROID) 75 mcg tablet Take 1 Tab by mouth Daily (before breakfast). (Patient taking differently: Take 125 mcg by mouth Daily (before breakfast). ) 90 Tab 3    sertraline (ZOLOFT) 25 mg tablet Take 25 mg by mouth daily.  (Patient not taking: Reported on 7/11/2021)      cholecalciferol, vitamin D3, (VITAMIN D3) 50 mcg (2,000 unit) tab Take  by mouth every seven (7) days. Clinical Impression     Clinical Impression:   1. Suspected COVID-19 virus infection        Disposition: This note was dictated utilizing voice recognition software which may lead to typographical errors. I apologize in advance if the situation occurs. If questions arise please do not hesitate to contact me or call our department.     Ganga Mason MD  7:52 AM

## 2021-07-11 NOTE — ED NOTES
Pt placed on hospital bed for comfort, Waiting for room assignment at [facility]

## 2021-07-11 NOTE — ED NOTES
Pt resting comfortably in the bed at this time. Pt has call bell within reach & has no complaints at this time. Pt is educated to cough & deep breathe.

## 2021-07-11 NOTE — ED NOTES
Per Lara Paula in Lab, PCR nasal swab left for inpatient lab at [facility] at 1045. She states that PCR typically takes one hour to result. Dr. Km Amado made aware.

## 2021-07-11 NOTE — ED NOTES
Pt very tearful. Wants to go home. Called her mother to come and get her. Dr. Otto Almanza in to spek with the pt.  Pt then agreed to stay for 24 hours

## 2021-07-11 NOTE — Clinical Note
[facility] EMERGENCY DEPT  [id number] [address]  [phone]    Work/School Note    Date: 7/11/2021     To Whom It May concern:    Dayan Peter was evaulated by the following provider(s):  Attending Provider: Paul Melendrez MD.   [address] virus is suspected. Per the CDC guidelines we recommend home isolation until the following conditions are all met:    1. At least 10 days have passed since symptoms first appeared and  2. At least 24 hours have passed since last fever without the use of fever-reducing medications and  3.  Symptoms (e.g., cough, shortness of breath) have improved    Sincerely,          Rocío James MD

## 2021-07-12 LAB
ALBUMIN SERPL-MCNC: 3.4 G/DL (ref 3.4–5)
ALBUMIN/GLOB SERPL: 1.2 {RATIO} (ref 0.8–1.7)
ALP SERPL-CCNC: 91 U/L (ref 45–117)
ALT SERPL-CCNC: 17 U/L (ref 13–56)
ANION GAP SERPL CALC-SCNC: 4 MMOL/L (ref 3–18)
AST SERPL-CCNC: 14 U/L (ref 10–38)
BASOPHILS # BLD: 0 K/UL (ref 0–0.1)
BASOPHILS NFR BLD: 0 % (ref 0–2)
BILIRUB SERPL-MCNC: 0.5 MG/DL (ref 0.2–1)
BUN SERPL-MCNC: 12 MG/DL (ref 7–18)
BUN/CREAT SERPL: 35 (ref 12–20)
CALCIUM SERPL-MCNC: 8.2 MG/DL (ref 8.5–10.1)
CHLORIDE SERPL-SCNC: 115 MMOL/L (ref 100–111)
CHOLEST SERPL-MCNC: 104 MG/DL
CK MB CFR SERPL CALC: 2 % (ref 0–4)
CK MB CFR SERPL CALC: 2.1 % (ref 0–4)
CK MB CFR SERPL CALC: 2.1 % (ref 0–4)
CK MB SERPL-MCNC: 1.6 NG/ML (ref 5–25)
CK MB SERPL-MCNC: 1.7 NG/ML (ref 5–25)
CK MB SERPL-MCNC: 3 NG/ML (ref 5–25)
CK SERPL-CCNC: 144 U/L (ref 26–192)
CK SERPL-CCNC: 80 U/L (ref 26–192)
CK SERPL-CCNC: 82 U/L (ref 26–192)
CO2 SERPL-SCNC: 25 MMOL/L (ref 21–32)
CREAT SERPL-MCNC: 0.34 MG/DL (ref 0.6–1.3)
CRP SERPL-MCNC: 1.4 MG/DL (ref 0–0.3)
D DIMER PPP FEU-MCNC: 0.48 UG/ML(FEU)
DIFFERENTIAL METHOD BLD: ABNORMAL
EOSINOPHIL # BLD: 0 K/UL (ref 0–0.4)
EOSINOPHIL NFR BLD: 0 % (ref 0–5)
ERYTHROCYTE [DISTWIDTH] IN BLOOD BY AUTOMATED COUNT: 13.2 % (ref 11.6–14.5)
GLOBULIN SER CALC-MCNC: 2.9 G/DL (ref 2–4)
GLUCOSE SERPL-MCNC: 96 MG/DL (ref 74–99)
HCT VFR BLD AUTO: 39 % (ref 35–45)
HDLC SERPL-MCNC: 36 MG/DL (ref 40–60)
HDLC SERPL: 2.9 {RATIO} (ref 0–5)
HGB BLD-MCNC: 12.5 G/DL (ref 12–16)
LDLC SERPL CALC-MCNC: 45 MG/DL (ref 0–100)
LIPID PROFILE,FLP: ABNORMAL
LYMPHOCYTES # BLD: 0.5 K/UL (ref 0.9–3.6)
LYMPHOCYTES NFR BLD: 22 % (ref 21–52)
MAGNESIUM SERPL-MCNC: 2.1 MG/DL (ref 1.6–2.6)
MCH RBC QN AUTO: 31.1 PG (ref 24–34)
MCHC RBC AUTO-ENTMCNC: 32.1 G/DL (ref 31–37)
MCV RBC AUTO: 97 FL (ref 74–97)
MONOCYTES # BLD: 0.6 K/UL (ref 0.05–1.2)
MONOCYTES NFR BLD: 24 % (ref 3–10)
NEUTS SEG # BLD: 1.2 K/UL (ref 1.8–8)
NEUTS SEG NFR BLD: 54 % (ref 40–73)
PLATELET # BLD AUTO: 106 K/UL (ref 135–420)
PLATELET COMMENTS,PCOM: ABNORMAL
PMV BLD AUTO: 13.4 FL (ref 9.2–11.8)
POTASSIUM SERPL-SCNC: 3.8 MMOL/L (ref 3.5–5.5)
PROCALCITONIN SERPL-MCNC: <0.05 NG/ML
PROT SERPL-MCNC: 6.3 G/DL (ref 6.4–8.2)
RBC # BLD AUTO: 4.02 M/UL (ref 4.2–5.3)
RBC MORPH BLD: ABNORMAL
RBC MORPH BLD: ABNORMAL
SODIUM SERPL-SCNC: 144 MMOL/L (ref 136–145)
T4 FREE SERPL-MCNC: 1.2 NG/DL (ref 0.7–1.5)
TRIGL SERPL-MCNC: 115 MG/DL (ref ?–150)
TROPONIN I SERPL-MCNC: <0.02 NG/ML (ref 0–0.04)
TSH SERPL DL<=0.05 MIU/L-ACNC: 0.55 UIU/ML (ref 0.36–3.74)
VLDLC SERPL CALC-MCNC: 23 MG/DL
WBC # BLD AUTO: 2.3 K/UL (ref 4.6–13.2)
WBC MORPH BLD: ABNORMAL

## 2021-07-12 PROCEDURE — 36415 COLL VENOUS BLD VENIPUNCTURE: CPT

## 2021-07-12 PROCEDURE — 84439 ASSAY OF FREE THYROXINE: CPT

## 2021-07-12 PROCEDURE — 85025 COMPLETE CBC W/AUTO DIFF WBC: CPT

## 2021-07-12 PROCEDURE — 80061 LIPID PANEL: CPT

## 2021-07-12 PROCEDURE — 80053 COMPREHEN METABOLIC PANEL: CPT

## 2021-07-12 PROCEDURE — 74011250637 HC RX REV CODE- 250/637: Performed by: FAMILY MEDICINE

## 2021-07-12 PROCEDURE — 65660000000 HC RM CCU STEPDOWN

## 2021-07-12 PROCEDURE — 74011000258 HC RX REV CODE- 258: Performed by: INTERNAL MEDICINE

## 2021-07-12 PROCEDURE — 85379 FIBRIN DEGRADATION QUANT: CPT

## 2021-07-12 PROCEDURE — 74011250636 HC RX REV CODE- 250/636: Performed by: EMERGENCY MEDICINE

## 2021-07-12 PROCEDURE — 74011000250 HC RX REV CODE- 250: Performed by: INTERNAL MEDICINE

## 2021-07-12 PROCEDURE — 99218 HC RM OBSERVATION: CPT

## 2021-07-12 PROCEDURE — 96376 TX/PRO/DX INJ SAME DRUG ADON: CPT

## 2021-07-12 PROCEDURE — 83735 ASSAY OF MAGNESIUM: CPT

## 2021-07-12 PROCEDURE — 74011000250 HC RX REV CODE- 250: Performed by: EMERGENCY MEDICINE

## 2021-07-12 PROCEDURE — 82553 CREATINE MB FRACTION: CPT

## 2021-07-12 PROCEDURE — 84443 ASSAY THYROID STIM HORMONE: CPT

## 2021-07-12 PROCEDURE — 84145 PROCALCITONIN (PCT): CPT

## 2021-07-12 PROCEDURE — 86140 C-REACTIVE PROTEIN: CPT

## 2021-07-12 RX ORDER — ACETAMINOPHEN 325 MG/1
650 TABLET ORAL
Status: DISCONTINUED | OUTPATIENT
Start: 2021-07-12 | End: 2021-07-13 | Stop reason: HOSPADM

## 2021-07-12 RX ORDER — AMLODIPINE BESYLATE 5 MG/1
5 TABLET ORAL DAILY
Status: DISCONTINUED | OUTPATIENT
Start: 2021-07-12 | End: 2021-07-13 | Stop reason: HOSPADM

## 2021-07-12 RX ORDER — HYDRALAZINE HYDROCHLORIDE 20 MG/ML
10 INJECTION INTRAMUSCULAR; INTRAVENOUS
Status: DISCONTINUED | OUTPATIENT
Start: 2021-07-12 | End: 2021-07-13 | Stop reason: HOSPADM

## 2021-07-12 RX ADMIN — LEVOTHYROXINE SODIUM 137 MCG: 25 TABLET ORAL at 08:30

## 2021-07-12 RX ADMIN — CHOLECALCIFEROL TAB 25 MCG (1000 UNIT) 2000 UNITS: 25 TAB at 08:30

## 2021-07-12 RX ADMIN — DEXAMETHASONE SODIUM PHOSPHATE 6 MG: 4 INJECTION, SOLUTION INTRAMUSCULAR; INTRAVENOUS at 13:57

## 2021-07-12 RX ADMIN — ACETAMINOPHEN 650 MG: 325 TABLET ORAL at 14:05

## 2021-07-12 RX ADMIN — AMLODIPINE BESYLATE 5 MG: 5 TABLET ORAL at 10:23

## 2021-07-12 RX ADMIN — FAMOTIDINE 20 MG: 20 TABLET, FILM COATED ORAL at 08:30

## 2021-07-12 RX ADMIN — SERTRALINE HYDROCHLORIDE 25 MG: 25 TABLET ORAL at 08:30

## 2021-07-12 RX ADMIN — REMDESIVIR 100 MG: 100 INJECTION, POWDER, LYOPHILIZED, FOR SOLUTION INTRAVENOUS at 15:24

## 2021-07-12 RX ADMIN — CEFTRIAXONE SODIUM 2 G: 2 INJECTION, POWDER, FOR SOLUTION INTRAMUSCULAR; INTRAVENOUS at 08:30

## 2021-07-12 RX ADMIN — ACETAMINOPHEN 650 MG: 325 TABLET ORAL at 08:27

## 2021-07-12 RX ADMIN — ACETAMINOPHEN 650 MG: 325 TABLET ORAL at 20:47

## 2021-07-12 RX ADMIN — FAMOTIDINE 20 MG: 20 TABLET, FILM COATED ORAL at 17:58

## 2021-07-12 RX ADMIN — ATORVASTATIN CALCIUM 20 MG: 20 TABLET, FILM COATED ORAL at 22:40

## 2021-07-12 RX ADMIN — ASPIRIN 81 MG: 81 TABLET, CHEWABLE ORAL at 08:30

## 2021-07-12 NOTE — H&P
History & Physical    Patient: Dayan Peter MRN: [id number]  CSN: [id number]    YOB: 1960  Age: 57 y.o. Sex: female      DOA: 7/11/2021    Chief Complaint:   Chief Complaint   Patient presents with    Flu Like Symptoms          HPI:     Dayan Prescott is a 57 y.o.  female who has history of hyperlipidemia COPD anxiety hypothyroidism. Ovarian cancer 2010 ,hypertension and seasonal allergy    Patient has been sick for 1 week with fever congestion runny nose and cough headache. Patient presented to the ER with mild intermittent shortness of breath productive cough with yellow sputum    Patient was found to have positive Covid test pulse oximetry was low 90. ED consulted with infectious disease recommended remdesivir 200 mg 1 dose and then 100 mg IV daily for 5 days    Patient had CTA of the chest was negative for PE. Patient was found to have UTI started on Rocephin IV urine culture blood culture was taken    CTA chest  1. No CT evidence of acute pulmonary embolism.     2. Mild burden of bilateral subpleural patchy and nodular groundglass densities,  likely infectious/inflammatory in etiology, including that which can be seen in  the setting of COVID-19. Recommend repeat chest CT in 3 months.     3. Multiple stable pulmonary nodules measuring up to 3 mm, favoring benign  etiology. Recommend continued attention on follow-up for #2.     4. Sequela of chronic healed granulomatous process.     5.  Cholelithiasis.     Past Medical History:   Diagnosis Date    [facility] ([facility] [address]     Ovarian cancer ([facility] [address]     Thyroid disease        Past Surgical History:   Procedure Laterality Date    COLONOSCOPY N/A 5/21/2019    COLONOSCOPY, SCREENING with hot snare polypectomy, w/ Bx performed by Alissa Huang MD at [facility] ENDOSCOPY    HX HYSTERECTOMY         Family History   Problem Relation Age of Onset    Thyroid Disease Mother     Breast Cancer Mother     Cancer Brother 24        brain    Cancer Maternal Aunt         lung    Breast Problems Maternal Aunt 29    Breast Cancer Maternal Aunt     Cancer Paternal Aunt     Cancer Paternal Uncle     Cancer Maternal Grandmother     Breast Cancer Maternal Grandmother     Cancer Maternal Grandfather     Cancer Other         child leukemia    Heart Attack Other        Social History     Socioeconomic History    Marital status:      Spouse name: Not on file    Number of children: Not on file    Years of education: Not on file    Highest education level: Not on file   Tobacco Use    Smoking status: Current Every Day Smoker    Smokeless tobacco: Never Used    Tobacco comment: smokes one cigarette day   Substance and Sexual Activity    Alcohol use: No    Drug use: No    Sexual activity: no     Social Determinants of Health     Financial Resource Strain:     Difficulty of Paying Living Expenses:    Food Insecurity:     Worried About Running Out of Food in the Last Year:     Ran Out of Food in the Last Year:    Transportation Needs:     Lack of Transportation (Medical):  Lack of Transportation (Non-Medical):    Physical Activity:     Days of Exercise per Week:     Minutes of Exercise per Session:    Stress:     Feeling of Stress :    Social Connections:     Frequency of Communication with Friends and Family:     Frequency of Social Gatherings with Friends and Family:     Attends Anabaptism Services:     Active Member of Clubs or Organizations:     Attends Club or Organization Meetings:     Marital Status:        Prior to Admission medications    Medication Sig Start Date End Date Taking? Authorizing Provider   aspirin 81 mg chewable tablet Take 81 mg by mouth daily. Yes Other, MD Laurent   calcium-cholecalciferol, d3, (CALCIUM 600 + D) 600-125 mg-unit tab Take  by mouth daily. Yes Provider, Historical   cyanocobalamin (VITAMIN B-12) 500 mcg tablet Take 500 mcg by mouth daily.    Yes Provider, Historical fluticasone-umeclidinium-vilanterol (TRELEGY ELLIPTA) 100-62.5-25 mcg inhaler Take 1 Puff by inhalation daily. Pt will stop 48 hrs prior to surgery   Yes Provider, Historical   mv-min-vit C-Glu-Tawana OS- (AIRBORNE, WITH LYSINE ACETATE,) 250-12.5 mg chew Take 2 Tabs by mouth. Yes Provider, Historical   levothyroxine (SYNTHROID) 75 mcg tablet Take 1 Tab by mouth Daily (before breakfast). Patient taking differently: Take 125 mcg by mouth Daily (before breakfast). 5/19/14  Yes Aiyana Chakraborty MD   sertraline (ZOLOFT) 25 mg tablet Take 25 mg by mouth daily. Patient not taking: Reported on 7/11/2021    Provider, Historical   cholecalciferol, vitamin D3, (VITAMIN D3) 50 mcg (2,000 unit) tab Take  by mouth every seven (7) days. Provider, Historical       Allergies   Allergen Reactions    Latex Hives    Morphine Anaphylaxis    Adhesive Hives    Seafood Hives and Nausea Only     Shellfish       Review of Systems  GENERAL: Patient alert, awake and oriented times 3, able to communicate full sentences and not in distress. HEENT: No change in vision, no earache, tinnitus, sore throat . H/o recurrent sinus infection and vocal cord poly seen by Dr Joann Reagan before   NECK: No pain or stiffness. CARDIOVASCULAR: No chest pain or pressure. No palpitations. PULMONARY: positive  shortness of breath,  Positive  Productive cough no  wheeze. GASTROINTESTINAL: No abdominal pain, nausea, vomiting or diarrhea, melena or       bright red blood per rectum. GENITOURINARY:positive  urinary frequency, and  urgency, hesitancy no dysuria. MUSCULOSKELETAL: No joint or muscle pain, no back pain, no recent trauma. H/o Lt foot fracture   DERMATOLOGIC: No rash, no itching, no lesions. ENDOCRINE: No polyuria, polydipsia, no heat or cold intolerance. No recent change in    weight. HEMATOLOGICAL: No anemia or easy bruising or bleeding.    NEUROLOGIC: No headache, seizures, numbness,generalized weakness   PSYCHIATRIC: No depression, h/o  Anxiety, no other mood disorder, no loss of interest in normal       activity or change in sleep pattern. Physical Exam:     Physical Exam:  Visit Vitals  BP (!) 168/79 (BP 1 Location: Left upper arm, BP Patient Position: At rest)   Pulse 62   Temp 97.9 °F (36.6 °C)   Resp 16   Ht 5' 3\" (1.6 m)   Wt 97.5 kg (215 lb)   SpO2 94%   BMI 38.09 kg/m²    O2 Flow Rate (L/min): 2 l/min O2 Device: None (Room air)    Temp (24hrs), Av.6 °F (37 °C), Min:97.9 °F (36.6 °C), Max:100.2 °F (37.9 °C)    No intake/output data recorded. 07/10 1901 -  0700  In: 300 [I.V.:300]  Out: 300 [Urine:300]    General:  Alert,no distress, appears stated age. Head:  Normocephalic, without obvious abnormality, atraumatic. Eyes:  Conjunctivae/corneas clear. PERRL, EOMs intact. Nose: Nares normal. No drainage \positive sinus tenderness    Throat: Lips, mucosa, and tongue dry   Neck: Supple, symmetrical, trachea midline, no adenopathy, thyroid: no enlargement/tenderness/nodules,    Back:   ROM normal. No CVA tenderness. Lungs:   Clear to auscultation bilaterally. Chest wall:  No tenderness or deformity. Heart:  Regular rate and rhythm, S1, S2 normal, no murmur, click, rub or gallop. Abdomen: Soft, non-tender. Bowel sounds normal. No masses,  No organomegaly. Extremities: Extremities normal, atraumatic, no cyanosis or edema. Pulses: 2+ and symmetric all extremities. Skin: Skin color, texture, turgor normal. No rashes or lesions   Neurologic: CNII-XII intact. No focal motor or sensory deficit. Labs Reviewed: All lab results for the last 24 hours reviewed.   CT and CXR    Procedures/imaging: see electronic medical records for all procedures/Xrays and details which were not copied into this note but were reviewed prior to creation of Plan        Assessment/Plan     Active Problems:    GERD (gastroesophageal reflux disease) (2/10/2014)      Hypertension (2/10/2014)      Hypothyroid (2/10/2014)      Respiratory failure with hypoxia ([facility] [address] (7/11/2021)      COVID-19 (7/11/2021)         Plan:    COVID-19 positive testwith Acute Pneumonia   Continue Rocephin 1 g IV  Follow-up D-dimer daily, CRP daily procalcitonin daily  Blood culture    Acute respiratory failure/COPD  Continue O2 by nasal cannula 2 L/min  Combivent inhaler 2 puffs every every 4 hour  Check cardiac enzymes every 8 hour  Check EKG  Patient will need evaluation for O2 discharge       UTI  Continue Rocephin 1 g IV daily  Follow-up urine culture blood culture      Hypothyroid  Check TSH and free T4  Patient on Synthroid 137 mcg daily at home    Vitamin D deficiency  Check vitamin D level  Vitamin D3 2000 units daily    Hypertension  Start Norvasc 5 mg daily  Patient not on meds at home  Continue to monitor blood pressure hydralazine 10 mg IV every 6 hours as needed systolic blood pressure above 160    Cbc, cmp, mag in AM   DVT/GI Prophylaxis: Lovenox and H2B/PPI    Part of this note was dictated use Dragon medical software. Please excuse errors that have escaped final proofreading.     Time for admission more than 65 minutes included review previous record,hospital record ,test result discussion with ER ,discussion with patient and exam. Discussion with nursing staff and documentation    Oscar Ramírez MD  7/12/2021  9:21  AM

## 2021-07-12 NOTE — PROGRESS NOTES
0403 - Pt has been off of oxygen for approx. 6 hours now. Pt O2 sats have been 94-96% on room air. Pt expressing to this nurse that she wants to get her prescriptions for antibiotics and leave first thing in the morning so she can \"make room for people who are actually sick. \"

## 2021-07-12 NOTE — CONSULTS
Infectious Disease Consultation Note        Reason: covid-19 pneumonia, UTI,sinusitis    Current abx Prior abx   Ceftriaxone since 7/11/21      Lines:       Assessment :    57 y.o.  female who has history of hyperlipidemia COPD anxiety hypothyroidism. Ovarian cancer 2010 ,hypertension and seasonal allergy presented to ED on 7/11/2021 with sinus congestion, subjective fever for about a week. Clinical presentation consistent with acute hypoxia-present on admission due to confirmed COVID-19 pneumonia, severe  Positive COVID-19 PCR on 7/11/2021  CTA chest 7/11-bilateral subpleural patchy and nodular groundglass densities    S/p decadron, remdesivir since 7/11/21    Dysuria- likely due to cystitis- improved    Improving oxygenation noted on today's exam.  Was saturating 98% on room air at rest when I evaluated her today    Recommendations:    1. Continue decadron. D/c remdesivir  2. Continue ceftriaxone  3. F/u urine cultures  4. Monitor oxygenation, inflammatory markers  5. Will switch to oral antibiotics based on urine cultures, clinical course    Thank you for consultation request. Above plan was discussed in details with patient. Please call me if any further questions or concerns. Will continue to participate in the care of this patient. HPI:    57 y.o.  female who has history of hyperlipidemia COPD anxiety hypothyroidism. Ovarian cancer 2010 ,hypertension and seasonal allergy presented to ED on 7/11/2021 with sinus congestion, subjective fever for about a week. Patient works in [place] [id number]. Patient has been sick for 1 week with fever congestion runny nose and cough headache. Patient presented to the ER with mild intermittent shortness of breath productive cough with yellow sputum. Patient is oxygen saturation was around 92% on room air upon presentation. I was consulted for further recommendations. I recommended to perform bio fire Covid test and obtain CTA chest if Covid test was negative.   Covid PCR was positive. I was called again for further recommendations. I recommended to initiate remdesivir, Decadron. Patient had CTA of the chest was negative for PE. Patient was found to have UTI started on Rocephin IV urine culture blood culture was taken   CTA chest   No CT evidence of acute pulmonary embolism. Mild burden of bilateral subpleural patchy and nodular groundglass densities. Patient also complained of dysuria. Patient was given antibiotics for urinary tract infection. I am seeing patient for further recommendations. Patient states that she feels significantly better today compared to yesterday. Her breathing is much better. She was saturating 98% on room air when I evaluated her. Her dysuria is resolved. Past Medical History:   Diagnosis Date    Cancer ([facility] [address]     Ovarian cancer ([facility] [address] [facility]     Thyroid disease        Past Surgical History:   Procedure Laterality Date    COLONOSCOPY N/A 5/21/2019    COLONOSCOPY, SCREENING with hot snare polypectomy, w/ Bx performed by Sidney Styles MD at [facility] Medication List    Details   aspirin 81 mg chewable tablet Take 81 mg by mouth daily. calcium-cholecalciferol, d3, (CALCIUM 600 + D) 600-125 mg-unit tab Take  by mouth daily. cyanocobalamin (VITAMIN B-12) 500 mcg tablet Take 500 mcg by mouth daily. fluticasone-umeclidinium-vilanterol (TRELEGY ELLIPTA) 100-62.5-25 mcg inhaler Take 1 Puff by inhalation daily. Pt will stop 48 hrs prior to surgery      mv-min-vit C-Glu-Tawana ac-hb124 (AIRBORNE, WITH LYSINE ACETATE,) 250-12.5 mg chew Take 2 Tabs by mouth.      levothyroxine (SYNTHROID) 75 mcg tablet Take 1 Tab by mouth Daily (before breakfast). Qty: 90 Tab, Refills: 3    Associated Diagnoses: Hypothyroid      sertraline (ZOLOFT) 25 mg tablet Take 25 mg by mouth daily. cholecalciferol, vitamin D3, (VITAMIN D3) 50 mcg (2,000 unit) tab Take  by mouth every seven (7) days.              Current Facility-Administered Medications   Medication Dose Route Frequency    levothyroxine (SYNTHROID) tablet 137 mcg  137 mcg Oral 6am    hydrALAZINE (APRESOLINE) 20 mg/mL injection 10 mg  10 mg IntraVENous Q6H PRN    acetaminophen (TYLENOL) tablet 650 mg  650 mg Oral Q4H PRN    amLODIPine (NORVASC) tablet 5 mg  5 mg Oral DAILY    ipratropium-albuterol (COMBIVENT RESPIMAT) 20 mcg-100 mcg inhalation spray  1 Puff Inhalation Q4H PRN    dexamethasone (DECADRON) 4 mg/mL injection 6 mg  6 mg IntraVENous Q24H    cefTRIAXone (ROCEPHIN) 2 g in sterile water (preservative free) 20 mL IV syringe  2 g IntraVENous Q24H    remdesivir 100 mg in 0.9% sodium chloride 250 mL IVPB  100 mg IntraVENous Q24H    aspirin chewable tablet 81 mg  81 mg Oral DAILY    sertraline (ZOLOFT) tablet 25 mg  25 mg Oral DAILY    cholecalciferol (VITAMIN D3) (1000 Units /25 mcg) tablet 2,000 Units  2,000 Units Oral DAILY    atorvastatin (LIPITOR) tablet 20 mg  20 mg Oral QHS    enoxaparin (LOVENOX) injection 40 mg  40 mg SubCUTAneous Q24H    famotidine (PEPCID) tablet 20 mg  20 mg Oral BID       Allergies: Latex, Morphine, Adhesive, and Seafood    Family History   Problem Relation Age of Onset    Thyroid Disease Mother     Breast Cancer Mother     Cancer Brother 24        brain    Cancer Maternal Aunt         lung    Breast Problems Maternal Aunt 29    Breast Cancer Maternal Aunt     Cancer Paternal Aunt     Cancer Paternal Uncle     Cancer Maternal Grandmother     Breast Cancer Maternal Grandmother     Cancer Maternal Grandfather     Cancer Other         child leukemia    Heart Attack Other      Social History     Socioeconomic History    Marital status:      Spouse name: Not on file    Number of children: Not on file    Years of education: Not on file    Highest education level: Not on file   Occupational History    Not on file   Tobacco Use    Smoking status: Current Every Day Smoker    Smokeless tobacco: Never Used    Tobacco comment: smokes one cigarette day   Substance and Sexual Activity    Alcohol use: No    Drug use: No    Sexual activity: Never   Other Topics Concern    Not on file   Social History Narrative    Not on file     Social Determinants of Health     Financial Resource Strain:     Difficulty of Paying Living Expenses:    Food Insecurity:     Worried About Running Out of Food in the Last Year:     [address] in the Last Year:    Transportation Needs:     Lack of Transportation (Medical):  Lack of Transportation (Non-Medical):    Physical Activity:     Days of Exercise per Week:     Minutes of Exercise per Session:    Stress:     Feeling of Stress :    Social Connections:     Frequency of Communication with Friends and Family:     Frequency of Social Gatherings with Friends and Family:     Attends Denominational Services:     Active Member of Clubs or Organizations:     Attends Club or Organization Meetings:     Marital Status:    Intimate Partner Violence:     Fear of Current or Ex-Partner:     Emotionally Abused:     Physically Abused:     Sexually Abused:      Social History     Tobacco Use   Smoking Status Current Every Day Smoker   Smokeless Tobacco Never Used   Tobacco Comment    smokes one cigarette day        Temp (24hrs), Av.2 °F (36.8 °C), Min:97.9 °F (36.6 °C), Max:98.8 °F (37.1 °C)    Visit Vitals  BP (!) 197/96   Pulse 77   Temp 98.8 °F (37.1 °C)   Resp 17   Ht 5' 3\" (1.6 m)   Wt 97.5 kg (215 lb)   SpO2 97%   BMI 38.09 kg/m²       ROS: 12 point ROS obtained in details. Pertinent positives as mentioned in HPI,   otherwise negative    Physical Exam:    Vitals signs and nursing note reviewed. Constitutional:       General: Sitting on chair, alert awake oriented x3, appears comfortable     Appearance: she is well-developed. HENT:      Head: Normocephalic.    Eyes:      Conjunctiva/sclera: Conjunctivae normal.      Neck:      Musculoskeletal: Normal range of motion and neck supple. Cardiovascular:      Rate and Rhythm: Normal rate and regular rhythm on monitor  Chest:      Bilateral chest movements equal.  Auscultation deferred due to Covid positive  Abdominal:      General: There is no distension. Palpations: Abdomen is soft. Tenderness: There is no abdominal tenderness. There is no rebound. Musculoskeletal: Normal range of motion. General: No tenderness. Skin:     General: Skin is warm and dry. Findings: No rash. Neurological:      Mental Status:  alert and oriented to person, place, and time. Cranial Nerves: No cranial nerve deficit. Motor: No abnormal muscle tone. Coordination: Coordination normal.   Psychiatric:         Behavior: Behavior normal.         Thought Content: Thought content normal.         Judgment: Judgment normal.     Labs: Results:   Chemistry Recent Labs     07/12/21  0447 07/11/21  0807   GLU 96 109*    142   K 3.8 3.4*   * 110   CO2 25 24   BUN 12 14   CREA 0.34* 0.56*   CA 8.2* 8.4*   AGAP 4 8   BUCR 35* 25*   AP 91 102   TP 6.3* 7.1   ALB 3.4 3.6   GLOB 2.9 3.5   AGRAT 1.2 1.0      CBC w/Diff Recent Labs     07/12/21  0447 07/11/21  0807   WBC 2.3* 5.0   RBC 4.02* 4.09*   HGB 12.5 13.3   HCT 39.0 39.2   * 117*   GRANS 54 47   LYMPH 22 29   EOS 0 0      Microbiology Recent Labs     07/11/21  0950 07/11/21  0935   CULT NO GROWTH AFTER 18 HOURS NO GROWTH AFTER 18 HOURS          RADIOLOGY:    All available imaging studies/reports in [facility] for this admission were reviewed      Disclaimer: Sections of this note are dictated utilizing voice recognition software, which may have resulted in some phonetic based errors in grammar and contents. Even though attempts were made to correct all the mistakes, some may have been missed, and remained in the body of the document. If questions arise, please contact our department.     Dr. Tu Leigh, Infectious Disease Specialist  [phone]  July 12, 2021  10:28 AM

## 2021-07-12 NOTE — ED NOTES
TRANSFER - OUT REPORT:    Verbal report given to Alesia Grey RN (name) on April R Rome  being transferred to  [facility], [address], Room 412 (unit) for routine progression of care       Report consisted of patients Situation, Background, Assessment and   Recommendations(SBAR). Information from the following report(s) SBAR, Kardex, MAR, Recent Results and Cardiac Rhythm Sinus Rhythm was reviewed with the receiving nurse. Lines:   Peripheral IV 07/11/21 Right Hand (Active)   Site Assessment Clean, dry, & intact 07/11/21 1544   Phlebitis Assessment 0 07/11/21 1544   Infiltration Assessment 0 07/11/21 1544   Dressing Status Clean, dry, & intact 07/11/21 1544   Hub Color/Line Status Pink 07/11/21 1544        Opportunity for questions and clarification was provided. Patient transported with:   Monitor  O2 @ 2 liters   Droplet Plus isolation.

## 2021-07-12 NOTE — PROGRESS NOTES
PT orders received and chart reviewed. PT eval attempted at 1152. Per RN, pt ambulating without AD and performing functional mobility independently in room, was found outside of room ambulating in halls by another nurse. No skilled PT needed at this time. Will sign off. Please reorder if change in functional mobility occurs.          Thank you for this referral  Christine Lucero  PT DPT

## 2021-07-12 NOTE — ROUTINE PROCESS
Patient walking in the hallway without a mask, patient is positive for Covid- 19 according to RSV. Patient asked to go back to her room and put on a mask. Explained to the patient that she couldn't walking in the hallway with positive Covid- 19 without a mask.  The supervisor was made aware and the nurse was informed

## 2021-07-12 NOTE — PROGRESS NOTES
OT order received and chart reviewed. Patient has been performing basic self care tasks and functional mobility with independence in hospital room/joe. No skilled OT indicated at this time; will complete the order.         Thank you for the referral,  Maribel Harvey MS, OTR/L

## 2021-07-12 NOTE — ED NOTES
Report given to [facility] for ALS Transfer to [facility], [address], Room 412 with Droplet Plus precaution, Cardiac Monitor and Oxygen @ 2 LPM via Nasal Cannula.

## 2021-07-12 NOTE — PROGRESS NOTES
This nurse has contacted the office of Dr. Vishal Parham and has spoken with the  to let her know that this patient has arrived from [facility] ED. The  confirmed with this nurse that she would pass this information aong to the MD on call so that the patient can be see an assessed.

## 2021-07-13 VITALS
WEIGHT: 215 LBS | HEIGHT: 63 IN | RESPIRATION RATE: 18 BRPM | SYSTOLIC BLOOD PRESSURE: 165 MMHG | DIASTOLIC BLOOD PRESSURE: 88 MMHG | BODY MASS INDEX: 38.09 KG/M2 | OXYGEN SATURATION: 94 % | TEMPERATURE: 98.7 F | HEART RATE: 74 BPM

## 2021-07-13 LAB
ALBUMIN SERPL-MCNC: 3.4 G/DL (ref 3.4–5)
ALBUMIN/GLOB SERPL: 1 {RATIO} (ref 0.8–1.7)
ALP SERPL-CCNC: 90 U/L (ref 45–117)
ALT SERPL-CCNC: 19 U/L (ref 13–56)
ANION GAP SERPL CALC-SCNC: 6 MMOL/L (ref 3–18)
AST SERPL-CCNC: 19 U/L (ref 10–38)
BASOPHILS # BLD: 0 K/UL (ref 0–0.1)
BASOPHILS NFR BLD: 0 % (ref 0–2)
BILIRUB SERPL-MCNC: 0.6 MG/DL (ref 0.2–1)
BUN SERPL-MCNC: 11 MG/DL (ref 7–18)
BUN/CREAT SERPL: 26 (ref 12–20)
CALCIUM SERPL-MCNC: 8.4 MG/DL (ref 8.5–10.1)
CHLORIDE SERPL-SCNC: 113 MMOL/L (ref 100–111)
CO2 SERPL-SCNC: 24 MMOL/L (ref 21–32)
CREAT SERPL-MCNC: 0.43 MG/DL (ref 0.6–1.3)
CRP SERPL-MCNC: 1.2 MG/DL (ref 0–0.3)
D DIMER PPP FEU-MCNC: 0.45 UG/ML(FEU)
DIFFERENTIAL METHOD BLD: ABNORMAL
EOSINOPHIL # BLD: 0 K/UL (ref 0–0.4)
EOSINOPHIL NFR BLD: 0 % (ref 0–5)
ERYTHROCYTE [DISTWIDTH] IN BLOOD BY AUTOMATED COUNT: 13.2 % (ref 11.6–14.5)
GLOBULIN SER CALC-MCNC: 3.3 G/DL (ref 2–4)
GLUCOSE SERPL-MCNC: 113 MG/DL (ref 74–99)
HCT VFR BLD AUTO: 37.3 % (ref 35–45)
HGB BLD-MCNC: 12.8 G/DL (ref 12–16)
LYMPHOCYTES # BLD: 0.8 K/UL (ref 0.9–3.6)
LYMPHOCYTES NFR BLD: 23 % (ref 21–52)
MAGNESIUM SERPL-MCNC: 2.2 MG/DL (ref 1.6–2.6)
MCH RBC QN AUTO: 32.7 PG (ref 24–34)
MCHC RBC AUTO-ENTMCNC: 34.3 G/DL (ref 31–37)
MCV RBC AUTO: 95.2 FL (ref 74–97)
MONOCYTES # BLD: 0.6 K/UL (ref 0.05–1.2)
MONOCYTES NFR BLD: 18 % (ref 3–10)
NEUTS SEG # BLD: 2 K/UL (ref 1.8–8)
NEUTS SEG NFR BLD: 59 % (ref 40–73)
PLATELET # BLD AUTO: 112 K/UL (ref 135–420)
PMV BLD AUTO: 13.7 FL (ref 9.2–11.8)
POTASSIUM SERPL-SCNC: 3.5 MMOL/L (ref 3.5–5.5)
PROCALCITONIN SERPL-MCNC: <0.05 NG/ML
PROT SERPL-MCNC: 6.7 G/DL (ref 6.4–8.2)
RBC # BLD AUTO: 3.92 M/UL (ref 4.2–5.3)
SODIUM SERPL-SCNC: 143 MMOL/L (ref 136–145)
WBC # BLD AUTO: 3.4 K/UL (ref 4.6–13.2)

## 2021-07-13 PROCEDURE — 86140 C-REACTIVE PROTEIN: CPT

## 2021-07-13 PROCEDURE — 96376 TX/PRO/DX INJ SAME DRUG ADON: CPT

## 2021-07-13 PROCEDURE — 80053 COMPREHEN METABOLIC PANEL: CPT

## 2021-07-13 PROCEDURE — 83735 ASSAY OF MAGNESIUM: CPT

## 2021-07-13 PROCEDURE — 85025 COMPLETE CBC W/AUTO DIFF WBC: CPT

## 2021-07-13 PROCEDURE — 99218 HC RM OBSERVATION: CPT

## 2021-07-13 PROCEDURE — 74011250637 HC RX REV CODE- 250/637: Performed by: FAMILY MEDICINE

## 2021-07-13 PROCEDURE — 74011000250 HC RX REV CODE- 250: Performed by: EMERGENCY MEDICINE

## 2021-07-13 PROCEDURE — 96372 THER/PROPH/DIAG INJ SC/IM: CPT

## 2021-07-13 PROCEDURE — 36415 COLL VENOUS BLD VENIPUNCTURE: CPT

## 2021-07-13 PROCEDURE — 85379 FIBRIN DEGRADATION QUANT: CPT

## 2021-07-13 PROCEDURE — 74011250636 HC RX REV CODE- 250/636: Performed by: FAMILY MEDICINE

## 2021-07-13 PROCEDURE — 74011250636 HC RX REV CODE- 250/636: Performed by: EMERGENCY MEDICINE

## 2021-07-13 PROCEDURE — 84145 PROCALCITONIN (PCT): CPT

## 2021-07-13 RX ORDER — DEXAMETHASONE 6 MG/1
6 TABLET ORAL 2 TIMES DAILY WITH MEALS
Qty: 7 TABLET | Refills: 0 | Status: ON HOLD | OUTPATIENT
Start: 2021-07-13 | End: 2021-07-21

## 2021-07-13 RX ORDER — LEVOTHYROXINE SODIUM 137 UG/1
137 TABLET ORAL
Qty: 30 TABLET | Refills: 0 | Status: SHIPPED | OUTPATIENT
Start: 2021-07-14

## 2021-07-13 RX ORDER — ACETAMINOPHEN 325 MG/1
650 TABLET ORAL
Qty: 30 TABLET | Refills: 0 | Status: SHIPPED | OUTPATIENT
Start: 2021-07-13

## 2021-07-13 RX ORDER — CEFPODOXIME PROXETIL 200 MG/1
200 TABLET, FILM COATED ORAL 2 TIMES DAILY
Qty: 10 TABLET | Refills: 0 | Status: SHIPPED | OUTPATIENT
Start: 2021-07-14 | End: 2021-07-25

## 2021-07-13 RX ORDER — FAMOTIDINE 20 MG/1
20 TABLET, FILM COATED ORAL 2 TIMES DAILY
Qty: 17 TABLET | Refills: 0 | Status: SHIPPED | OUTPATIENT
Start: 2021-07-13

## 2021-07-13 RX ORDER — ATORVASTATIN CALCIUM 20 MG/1
20 TABLET, FILM COATED ORAL
Qty: 30 TABLET | Refills: 1 | Status: SHIPPED | OUTPATIENT
Start: 2021-07-13

## 2021-07-13 RX ORDER — AMLODIPINE BESYLATE 5 MG/1
5 TABLET ORAL DAILY
Qty: 30 TABLET | Refills: 1 | Status: ON HOLD | OUTPATIENT
Start: 2021-07-14 | End: 2021-07-21

## 2021-07-13 RX ADMIN — ENOXAPARIN SODIUM 40 MG: 40 INJECTION SUBCUTANEOUS at 00:09

## 2021-07-13 RX ADMIN — ACETAMINOPHEN 650 MG: 325 TABLET ORAL at 08:01

## 2021-07-13 RX ADMIN — AMLODIPINE BESYLATE 5 MG: 5 TABLET ORAL at 08:01

## 2021-07-13 RX ADMIN — SERTRALINE HYDROCHLORIDE 25 MG: 25 TABLET ORAL at 08:01

## 2021-07-13 RX ADMIN — CEFTRIAXONE SODIUM 2 G: 2 INJECTION, POWDER, FOR SOLUTION INTRAMUSCULAR; INTRAVENOUS at 08:01

## 2021-07-13 RX ADMIN — ASPIRIN 81 MG: 81 TABLET, CHEWABLE ORAL at 08:01

## 2021-07-13 RX ADMIN — FAMOTIDINE 20 MG: 20 TABLET, FILM COATED ORAL at 08:01

## 2021-07-13 RX ADMIN — LEVOTHYROXINE SODIUM 137 MCG: 25 TABLET ORAL at 05:22

## 2021-07-13 RX ADMIN — CHOLECALCIFEROL TAB 25 MCG (1000 UNIT) 2000 UNITS: 25 TAB at 08:01

## 2021-07-13 NOTE — DISCHARGE SUMMARY
Discharge Summary     Patient: Dayan Peter MRN: [id number]  SSN: [id number]    YOB: 1960  Age: 57 y.o. Sex: female       Admit Date: 7/11/2021    Discharge Date: 7/13/2021      Admission Diagnoses: Respiratory failure with hypoxia ([facility] [address] [J96.91]  [id number]-60 [U07.1]    Discharge Diagnoses:   Problem List as of 7/13/2021 Date Reviewed: 7/11/2021          Codes Class Noted - Resolved    Respiratory failure with hypoxia [facility]) ICD-10-CM: J96.91  ICD-9-CM: 518.81  7/11/2021 - Present        COVID-19 ICD-10-CM: U07.1  ICD-9-CM: 079.89  7/11/2021 - Present        Chest pain ICD-10-CM: R07.9  ICD-9-CM: 786.50  8/31/2017 - Present        Spinal stenosis of lumbar region with radiculopathy ICD-10-CM: M48.061, M54.16  ICD-9-CM: 724.02, 724.4  7/2/2014 - Present        Degenerative lumbar spinal stenosis ICD-10-CM: M48.061  ICD-9-CM: 724.02  7/2/2014 - Present        GERD (gastroesophageal reflux disease) (Chronic) ICD-10-CM: K21.9  ICD-9-CM: 530.81  2/10/2014 - Present        Hypertension (Chronic) ICD-10-CM: I10  ICD-9-CM: 401.9  2/10/2014 - Present        Hypothyroid (Chronic) ICD-10-CM: E03.9  ICD-9-CM: 244.9  2/10/2014 - Present                 Discharge Condition: Fair    HPI: Dayan Londono is a 57 YOF w/a h/o HTN, HLD, COPD, anxiety, hypothyroidism, seasonal allergies and Ovarian cancer (2010). Patient has been sick for 1 week with fever congestion runny nose and cough headache. Patient presented to the ER  7/11/21 with mild intermittent shortness of breath productive cough with yellow sputum     Patient was found to have positive Covid test and her pulse oximetry was low 90. ED consulted with infectious disease (Dr. Lyssa Lowe) who recommended remdesivir 200 mg 1 dose and then 100 mg IV daily for 5 days. Patient was also found to have UTI on UA and was started on Rocephin IV. Urine culture & blood culture was taken (pending).      Consults:  Infectious Disease (Dr. Susan Rosales) recommends to continue decadron, d/c remdesivir, continue ceftriaxone, f/u urine cultures, monitor oxygenation, inflammatory markers, and dill switch to oral antibiotics based on urine cultures, clinical course. Pt anxious to leave declined staying until culture result discussed with ID f/u urine culture as outpatinet will give VAntin   PT/OT states no skilled PT/OT needed at this time as pt is performing functional mobitity independently in room/ambulating in the halls. Initial labs:  Pt tested positive for COVID-19   RBC (L) 4.09; Platelets (L) 094  Potassium (L) 3.4, Calcium (L) 8.4; Glucose (H) 109  Creatinine (L) . 56; BUN/Cr (H) 25;   CRP (H) 1.5  UA: Nitritites +, Bacteria +4    Initial Diagnostic imaging included Chest X-ray, CTA Chest, CT of Head and Maxillofacial (results below). Hospital Course:     COVID-19 positive testwith Acute Pneumonia   Continue Rocephin 1 g PO as outpatient  Follow-up D-dimer daily- .45 (WNL)  Follow-up CRP daily- 1.2 (WNL)  Follow-up procalcitonin daily- <0.05 (WNL)  Follow-up blood culture- pending  Continue Decadron x 7days switch to oral on discharge   D/c Remdesivir By ID  Monitor oxygenation, inflammatory markers as OP     Acute respiratory failure/COPD  She is off oxygen didn't;t need supplement   She has trilogy at home    Check cardiac enzymes every 8 hour remains negative   Had EKG order not done pt anxious to leave will follow up out patient     UTI   Rocephin 1 g IV daily was switch to Vantin by ID for 5 days  Follow-up urine culture - pending advised pt to f/u as outpatient in one week    to check urine culture result      Hypothyroid  Check TSH and free T4; TSH (WNL) . 55, Free T4 (WNL) 1.2  Patient on Synthroid 137 mcg daily at home  Continue same dose      Vitamin D deficiency  Check vitamin D level- pending  Vitamin D3 2000 units daily     Hypertension  Start Norvasc 5 mg daily  Patient not on meds at home  Continue to monitor blood pressure    Consults: Infectious Disease    Significant Diagnostic Studies:  XR Chest Port (7/11)  Unchanged reticular opacities at the left lung base, which may reflect  atelectasis or scarring. CT Head WO Cont (7/11)  1. No evidence of acute intracranial abnormality. 2. Mild mucosal thickening of bilateral ethmoid air cells and sphenoid sinuses with a few frothy secretions in the right saphenous sinus. 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 CT Maxillofacial WO Cont (7/11)  There is mild mucosal thickening throughout majority of the sinuses with a few frothy secretions in the right sphenoid sinus. 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 CTA chest (7/11)  1. No CT evidence of acute pulmonary embolism. 2. Mild burden of bilateral subpleural patchy and nodular groundglass densities, likely infectious/inflammatory in etiology, including that which can be seen in the setting of COVID-19. Recommend repeat chest CT in 3 months. 3. Multiple stable pulmonary nodules measuring up to 3 mm, favoring benign etiology. Recommend continued attention on follow-up for #2. 4. Sequela of chronic healed granulomatous process. 5. Cholelithiasis      Physical Examination:   General:  Alert,no distress, appears stated age. Head:  Normocephalic, without obvious abnormality, atraumatic. Eyes:  Conjunctivae/corneas clear. PERRL, EOMs intact. Nose: Nares normal. No drainage \positive sinus tenderness    Throat: Lips, mucosa, and tongue normal   Neck: Supple, symmetrical, trachea midline, no adenopathy, thyroid: no enlargement/tenderness/nodules,    Back:   ROM normal. No CVA tenderness. Lungs:   Clear to auscultation bilaterally. Chest wall:  No tenderness or deformity. Heart:  Regular rate and rhythm, S1, S2 normal, no murmur, click, rub or gallop. Abdomen: Soft, non-tender. Bowel sounds normal. No masses,  No organomegaly.    Extremities: Extremities normal, atraumatic, no cyanosis or edema. Pulses: 2+ and symmetric all extremities. Skin: Skin color, texture, turgor normal. No rashes or lesions   Neurologic: CNII-XII intact. No focal motor or sensory deficit. Disposition: home    Discharge Medications:   Discharge Medication List as of 7/13/2021 11:18 AM      START taking these medications    Details   dexAMETHasone (Decadron) 6 mg tablet Take 1 Tablet by mouth two (2) times daily (with meals). , Print, Disp-7 Tablet, R-0      acetaminophen (TYLENOL) 325 mg tablet Take 2 Tablets by mouth every four (4) hours as needed for Pain., Print, Disp-30 Tablet, R-0      amLODIPine (NORVASC) 5 mg tablet Take 1 Tablet by mouth daily. , Print, Disp-30 Tablet, R-1      atorvastatin (LIPITOR) 20 mg tablet Take 1 Tablet by mouth nightly. , Print, Disp-30 Tablet, R-1      famotidine (PEPCID) 20 mg tablet Take 1 Tablet by mouth two (2) times a day., Print, Disp-17 Tablet, R-0      cefpodoxime (VANTIN) 200 mg tablet Take 1 Tablet by mouth two (2) times a day. Indications: infection of the urinary tract from Proteus bacteria, Print, Disp-10 Tablet, R-0         CONTINUE these medications which have CHANGED    Details   levothyroxine (SYNTHROID) 137 mcg tablet Take 137 mcg by mouth every morning., Print, Disp-30 Tablet, R-0         CONTINUE these medications which have NOT CHANGED    Details   aspirin 81 mg chewable tablet Take 81 mg by mouth daily. , Historical Med      sertraline (ZOLOFT) 25 mg tablet Take 25 mg by mouth daily. , Historical Med      calcium-cholecalciferol, d3, (CALCIUM 600 + D) 600-125 mg-unit tab Take  by mouth daily. , Historical Med      cholecalciferol, vitamin D3, (VITAMIN D3) 50 mcg (2,000 unit) tab Take  by mouth every seven (7) days. , Historical Med      cyanocobalamin (VITAMIN B-12) 500 mcg tablet Take 500 mcg by mouth daily. , Historical Med      fluticasone-umeclidinium-vilanterol (TRELEGY ELLIPTA) 100-62.5-25 mcg inhaler Take 1 Puff by inhalation daily.  Pt will stop 48 hrs prior to surgery, Historical Med      mv-min-vit C-Glu-Tawana [id number] (AIRBORNE, WITH LYSINE ACETATE,) 250-12.5 mg chew Take 2 Tabs by mouth., Historical Med           Activity: Activity as tolerated  Diet: Regular Diet  Wound Care: None needed    Follow-up Appointments   Procedures    FOLLOW UP VISIT Appointment in: One Week Follow up Dr Corrie Chapman in one week cbc, cmp, mag , monitor blood pressure droplet plus isolation for 7 more days     Follow up Dr Corrie Chapman in one week cbc, cmp, mag , monitor blood pressure droplet plus isolation for 7 more days     Standing Status:   Standing     Number of Occurrences:   1     Order Specific Question:   Appointment in     Answer: One Week         Time for discharge more than 45 minutes included discussion with patient review chart med reconciliation coordination of care with ID  Print medications and documentation      Signed By: DONALD Cook     July 13, 2021    The patient was seen and examined independently, I agree with the student note  Review note and appropriate changes was made.  Discussed with student my assessment and plan as outlined in the above note     Time for discharge more than 45 minutes included discussion with patient review chart med reconciliation coordination of care with ID  Print medications and documentation    Arnold Baker MD  7/21/2021

## 2021-07-13 NOTE — PROGRESS NOTES
Infectious Disease progress Note        Reason: covid-19 pneumonia, UTI,sinusitis    Current abx Prior abx   Ceftriaxone since 7/11/21      Lines:       Assessment :    57 y.o.  female who has history of hyperlipidemia COPD anxiety hypothyroidism. Ovarian cancer 2010 ,hypertension and seasonal allergy presented to ED on 7/11/2021 with sinus congestion, subjective fever for about a week. Clinical presentation consistent with acute hypoxia-present on admission due to confirmed COVID-19 pneumonia, severe  Positive COVID-19 PCR on 7/11/2021  CTA chest 7/11-bilateral subpleural patchy and nodular groundglass densities    S/p decadron since 7/11, remdesivir 7/11/21-7/12    Dysuria- likely due to cystitis- improved    Oxygen saturation remains above 90% on room air. Subjective sense of improvement. Wishes to go home. Recommendations:    1. Continue decadron-switch to p.o.  2.  Discontinue ceftriaxone. Start oral Cefpodoxime for 5 more days  3. F/u ID and susceptibility of gram-negative tab in urine cultures-we will need antibiotic modification as outpatient of gram-negative tab resistant to Cefpodoxime  4. Discharge planning per primary team     Above plan was discussed in details with patient, dr. Uday Vernon. Please call me if any further questions or concerns. Will continue to participate in the care of this patient. HPI:      Patient states that she feels significantly better today compared to yesterday. Her breathing is much better. She wishes to go home. Her dysuria is resolved.        home Medication List    Details   aspirin 81 mg chewable tablet Take 81 mg by mouth daily. calcium-cholecalciferol, d3, (CALCIUM 600 + D) 600-125 mg-unit tab Take  by mouth daily. cyanocobalamin (VITAMIN B-12) 500 mcg tablet Take 500 mcg by mouth daily. fluticasone-umeclidinium-vilanterol (TRELEGY ELLIPTA) 100-62.5-25 mcg inhaler Take 1 Puff by inhalation daily.  Pt will stop 48 hrs prior to surgery mv-min-vit C-Glu-Tawana [id number] ([place], WITH LYSINE ACETATE,) 250-12.5 mg chew Take 2 Tabs by mouth.      levothyroxine (SYNTHROID) 75 mcg tablet Take 1 Tab by mouth Daily (before breakfast). Qty: 90 Tab, Refills: 3    Associated Diagnoses: Hypothyroid      sertraline (ZOLOFT) 25 mg tablet Take 25 mg by mouth daily. cholecalciferol, vitamin D3, (VITAMIN D3) 50 mcg (2,000 unit) tab Take  by mouth every seven (7) days.              Current Facility-Administered Medications   Medication Dose Route Frequency    levothyroxine (SYNTHROID) tablet 137 mcg  137 mcg Oral 6am    hydrALAZINE (APRESOLINE) 20 mg/mL injection 10 mg  10 mg IntraVENous Q6H PRN    acetaminophen (TYLENOL) tablet 650 mg  650 mg Oral Q4H PRN    amLODIPine (NORVASC) tablet 5 mg  5 mg Oral DAILY    ipratropium-albuterol (COMBIVENT RESPIMAT) 20 mcg-100 mcg inhalation spray  1 Puff Inhalation Q4H PRN    dexamethasone (DECADRON) 4 mg/mL injection 6 mg  6 mg IntraVENous Q24H    cefTRIAXone (ROCEPHIN) 2 g in sterile water (preservative free) 20 mL IV syringe  2 g IntraVENous Q24H    aspirin chewable tablet 81 mg  81 mg Oral DAILY    sertraline (ZOLOFT) tablet 25 mg  25 mg Oral DAILY    cholecalciferol (VITAMIN D3) (1000 Units /25 mcg) tablet 2,000 Units  2,000 Units Oral DAILY    atorvastatin (LIPITOR) tablet 20 mg  20 mg Oral QHS    enoxaparin (LOVENOX) injection 40 mg  40 mg SubCUTAneous Q24H    famotidine (PEPCID) tablet 20 mg  20 mg Oral BID       Allergies: Latex, Morphine, Adhesive, and Seafood    Family History   Problem Relation Age of Onset    Thyroid Disease Mother     Breast Cancer Mother     Cancer Brother 21        brain    Cancer Maternal Aunt         lung    Breast Problems Maternal Aunt 29    Breast Cancer Maternal Aunt     Cancer Paternal Aunt     Cancer Paternal Uncle     Cancer Maternal Grandmother     Breast Cancer Maternal Grandmother     Cancer Maternal Grandfather     Cancer Other         child leukemia    Heart Attack Other Social History     Socioeconomic History    Marital status:      Spouse name: Not on file    Number of children: Not on file    Years of education: Not on file    Highest education level: Not on file   Occupational History    Not on file   Tobacco Use    Smoking status: Current Every Day Smoker    Smokeless tobacco: Never Used    Tobacco comment: smokes one cigarette day   Substance and Sexual Activity    Alcohol use: No    Drug use: No    Sexual activity: Never   Other Topics Concern    Not on file   Social History Narrative    Not on file     Social Determinants of Health     Financial Resource Strain:     Difficulty of Paying Living Expenses:    Food Insecurity:     Worried About [address] [employer] in the Last Year:     Ran Out of Food in the Last Year:    Transportation Needs:     Lack of Transportation (Medical):     Lack of Transportation (Non-Medical):    Physical Activity:     Days of Exercise per Week:     Minutes of Exercise per Session:    Stress:     Feeling of Stress :    Social Connections:     Frequency of Communication with Friends and Family:     Frequency of Social Gatherings with Friends and Family:     Attends Yarsanism Services: Active Member of Clubs or Organizations:     Attends Club or Organization Meetings:     Marital Status:    Intimate Partner Violence:     Fear of Current or Ex-Partner:     Emotionally Abused:     Physically Abused:     Sexually Abused:      Social History     Tobacco Use   Smoking Status Current Every Day Smoker   Smokeless Tobacco Never Used   Tobacco Comment    smokes one cigarette day        Temp (24hrs), Av.2 °F (37.3 °C), Min:98.7 °F (37.1 °C), Max:99.7 °F (37.6 °C)    Visit Vitals  BP (!) 165/88   Pulse 74   Temp 98.7 °F (37.1 °C)   Resp 18   Ht 5' 3\" (1.6 m)   Wt 97.5 kg (215 lb)   SpO2 94%   BMI 38.09 kg/m²       ROS: 12 point ROS obtained in details.  Pertinent positives as mentioned in HPI,   otherwise negative    Physical Exam:    Vitals signs and nursing note reviewed. Constitutional:       General: Sitting on chair, alert awake oriented x3, appears comfortable     Appearance: she is well-developed. HENT:      Head: Normocephalic. Eyes:      Conjunctiva/sclera: Conjunctivae normal.      Neck:      Musculoskeletal: Normal range of motion and neck supple. Cardiovascular:      Rate and Rhythm: Normal rate and regular rhythm on monitor  Chest:      Bilateral chest movements equal.  Auscultation deferred due to Covid positive  Abdominal:      General: There is no distension. Palpations: Abdomen is soft. Tenderness: There is no abdominal tenderness. There is no rebound. Musculoskeletal: Normal range of motion. General: No tenderness. Skin:     General: Skin is warm and dry. Findings: No rash. Neurological:      Mental Status:  alert and oriented to person, place, and time. Cranial Nerves: No cranial nerve deficit. Motor: No abnormal muscle tone. Coordination: Coordination normal.   Psychiatric:         Behavior: Behavior normal.         Thought Content:  Thought content normal.         Judgment: Judgment normal.     Labs: Results:   Chemistry Recent Labs     07/13/21  0241 07/12/21 0447 07/11/21  0807   * 96 109*    144 142   K 3.5 3.8 3.4*   * 115* 110   CO2 24 25 24   BUN 11 12 14   CREA 0.43* 0.34* 0.56*   CA 8.4* 8.2* 8.4*   AGAP 6 4 8   BUCR 26* 35* 25*   AP 90 91 102   TP 6.7 6.3* 7.1   ALB 3.4 3.4 3.6   GLOB 3.3 2.9 3.5   AGRAT 1.0 1.2 1.0      CBC w/Diff Recent Labs     07/13/21  0241 07/12/21  0447 07/11/21  0807   WBC 3.4* 2.3* 5.0   RBC 3.92* 4.02* 4.09*   HGB 12.8 12.5 13.3   HCT 37.3 39.0 39.2   * 106* 117*   GRANS 59 54 47   LYMPH 23 22 29   EOS 0 0 0      Microbiology Recent Labs     07/11/21  0950 07/11/21  0935   CULT NO GROWTH 2 DAYS NO GROWTH 2 DAYS          RADIOLOGY:    All available imaging studies/reports in [facility] care for this admission were reviewed      Disclaimer: Sections of this note are dictated utilizing voice recognition software, which may have resulted in some phonetic based errors in grammar and contents. Even though attempts were made to correct all the mistakes, some may have been missed, and remained in the body of the document. If questions arise, please contact our department.     Dr. Mary Grijalva, Infectious Disease Specialist  [phone]  July 13, 2021  10:28 AM

## 2021-07-13 NOTE — PROGRESS NOTES
Physician Progress Note      Travis Patrick  [facility] #:                  [id number]  :                       1960  ADMIT DATE:       2021 7:49 AM  DISCH DATE:  RESPONDING  PROVIDER #:        Jonelle Redmond MD          QUERY TEXT:    Patient admitted with COVID-19. Noted documentation of acute respiratory failure in the H&P. In order to support the diagnosis of acute respiratory failure, please include additional clinical indicators in your documentation. Or please document if the diagnosis of acute respiratory failure has been ruled out after further study. The medical record reflects the following:  Risk Factors: COVID-19; pneumonia; COPD; anxiety  Clinical Indicators: Sats  92-99% 2L;  RR 13-24  ER notes:  Positive for cough and shortness of breath. No ABGs  Treatment: O2 2L; Combivent inhaler    Acute Respiratory Failure Clinical Indicators per [employer]-DR[employer] Training Guide and Quick Reference Guide:  pO2 < 60 mmHg or SpO2 (pulse oximetry) < 91% breathing room air  pCO2 > 50 and pH < 7.35  P/F ratio (pO2 / FIO2) < 300  pO2 decrease or pCO2 increase by 10 mmHg from baseline (if known)  Supplemental oxygen of 40% or more  Presence of respiratory distress, tachypnea, dyspnea, shortness of breath, wheezing  Unable to speak in complete sentences  Use of accessory muscles to breathe  Extreme anxiety and feeling of impending doom  Tripod position  Confusion/altered mental status/obtunded    Thank you,  Emma Healy RN/CCDS  [phone]  Options provided:  -- Acute Respiratory Failure as evidenced by, Please document evidence.   -- Acute Respiratory Failure ruled out after study  -- Other - I will add my own diagnosis  -- Disagree - Not applicable / Not valid  -- Disagree - Clinically unable to determine / Unknown  -- Refer to Clinical Documentation Reviewer    PROVIDER RESPONSE TEXT:    This patient is in acute respiratory failure as evidenced by o2 sat 90 % on 2L/M cough SOB    Query created by: Lavell Herrmann on 7/12/2021 10:16 AM      Electronically signed by:  Radha Sagastume MD 7/12/2021 9:22 PM

## 2021-07-13 NOTE — PROGRESS NOTES
Progress Note    Patient: Dayan Peter MRN: [id number]  CSN: [id number]    YOB: 1960  Age: 57 y.o. Sex: female    DOA: 7/11/2021 LOS:  LOS: 2 days                    Subjective:   Dayan Zayas is a 57 YOF w/a h/o HTN, HLD, COPD, anxiety, hypothyroidism, seasonal allergies and Ovarian cancer (2010). Patient has been sick for 1 week with fever congestion runny nose and cough headache. Patient presented to the ER with mild intermittent shortness of breath productive cough with yellow sputum     Patient was found to have positive Covid test pulse oximetry was low 90.     ED consulted with infectious disease recommended remdesivir 200 mg 1 dose and then 100 mg IV daily for 5 days     Patient had CTA of the chest was negative for PE. Patient was found to have UTI started on Rocephin IV urine culture blood culture was taken     Consults:  Infectious Disease (Dr. Cora Adrian) recommends to continue decadron, d/c remdesivir, continue ceftriaxone, f/u urine cultures, monitor oxygenation, inflammatory markers, and dill switch to oral antibiotics based on urine cultures, clinical course. PT/OT states no skilled PT/OT needed at this time as pt is performing functional mobitity independently in room/ambulating in the halls. Recent labs:  Pt tested positive for COVID-19 PCR  WBC (L) 3.4; RBC (L) 3.92; Platelets (L) 082  Chloride (H) 113, Calcium (L) 8.4; Glucose (H) 113  Creatinine (L) . 43; BUN/Cr (H) 26;   CRP (H) 1.2  D Dimer- .45    Most recent imaging:  CTA chest (7/11)  1. No CT evidence of acute pulmonary embolism.     2. Mild burden of bilateral subpleural patchy and nodular groundglass densities,  likely infectious/inflammatory in etiology, including that which can be seen in  the setting of COVID-19. Recommend repeat chest CT in 3 months.     3. Multiple stable pulmonary nodules measuring up to 3 mm, favoring benign  etiology. Recommend continued attention on follow-up for #2.     4. Sequela of chronic healed granulomatous process.     5. Cholelithiasis  Chief Complaint:   Chief Complaint   Patient presents with    Flu Like Symptoms       Review of systems  General: A&O x3; No fevers or chills. Cardiovascular: No chest pain or pressure. No palpitations. Pulmonary: + shortness of breath, + productive cough; no wheeze. Gastrointestinal: No abdominal pain, nausea, vomiting or diarrhea. Genitourinary: + urinary frequency, urgency, hesitancy or dysuria. Musculoskeletal:h/o left foot fracture; No joint or muscle pain, no back pain, no recent trauma. Neurologic: No headache, numbness, tingling or weakness. Objective:     Physical Exam:  Visit Vitals  /77 (BP 1 Location: Left upper arm, BP Patient Position: At rest)   Pulse 61   Temp 98.7 °F (37.1 °C)   Resp 16   Ht 5' 3\" (1.6 m)   Wt 97.5 kg (215 lb)   SpO2 96%   BMI 38.09 kg/m²        General:         A&O x3, cooperative, no acute distress    HEENT: NC, Atraumatic. PERRLA, anicteric sclerae; h/o recurrent sinus infection and vocal cord poly seen by Dr Sandhu Running before; Lips, mucosa, and tongue dry   Lungs: +SOB; + Cough (productive); CTA Bilaterally. No Wheezing/Rhonchi/Rales. Heart:  Regular  rhythm,  No murmur, No Rubs, No Gallops  Abdomen: Soft, Non distended, Non tender.  +Bowel sounds, no HSM  Extremities: No c/c/e  Psych:   Not anxious or agitated, no loss of interest in normal activities or change in sleep pattern  Neurologic:  CN 2-12 grossly intact, Alert and oriented X 3. No acute neurological deficits    Intake and Output:  Current Shift:  No intake/output data recorded.   Last three shifts:  07/11 1901 - 07/13 0700  In: -   Out: 450 [Urine:450]    Labs: Results:       Chemistry Recent Labs     07/13/21  0241 07/12/21  0447 07/11/21  0807   * 96 109*    144 142   K 3.5 3.8 3.4*   * 115* 110   CO2 24 25 24   BUN 11 12 14   CREA 0.43* 0.34* 0.56*   CA 8.4* 8.2* 8.4*   AGAP 6 4 8   BUCR 26* 35* 25*   AP 90 91 102   TP 6.7 6.3* 7.1   ALB 3.4 3.4 3.6   GLOB 3.3 2.9 3.5   AGRAT 1.0 1.2 1.0      CBC w/Diff Recent Labs     07/13/21  0241 07/12/21  0447 07/11/21  0807   WBC 3.4* 2.3* 5.0   RBC 3.92* 4.02* 4.09*   HGB 12.8 12.5 13.3   HCT 37.3 39.0 39.2   * 106* 117*   GRANS 59 54 47   LYMPH 23 22 29   EOS 0 0 0      Cardiac Enzymes Recent Labs     07/12/21  1500 07/12/21  0447    82  80   CKND1 2.1 2.0  2.1      Coagulation No results for input(s): PTP, INR, APTT, INREXT in the last 72 hours. Lipid Panel Lab Results   Component Value Date/Time    Cholesterol, total 104 07/12/2021 04:47 AM    HDL Cholesterol 36 (L) 07/12/2021 04:47 AM    LDL, calculated 45 07/12/2021 04:47 AM    VLDL, calculated 23 07/12/2021 04:47 AM    Triglyceride 115 07/12/2021 04:47 AM    CHOL/HDL Ratio 2.9 07/12/2021 04:47 AM      BNP No results for input(s): BNPP in the last 72 hours.    Liver Enzymes Recent Labs     07/13/21  0241   TP 6.7   ALB 3.4   AP 90      Thyroid Studies Lab Results   Component Value Date/Time    T4, Total 5.2 04/10/2012 05:41 PM    TSH 0.55 07/12/2021 04:47 AM          Procedures/imaging: see electronic medical records for all procedures/Xrays and details which were not copied into this note but were reviewed prior to creation of Plan    Medications:   Current Facility-Administered Medications   Medication Dose Route Frequency    levothyroxine (SYNTHROID) tablet 137 mcg  137 mcg Oral 6am    hydrALAZINE (APRESOLINE) 20 mg/mL injection 10 mg  10 mg IntraVENous Q6H PRN    acetaminophen (TYLENOL) tablet 650 mg  650 mg Oral Q4H PRN    amLODIPine (NORVASC) tablet 5 mg  5 mg Oral DAILY    ipratropium-albuterol (COMBIVENT RESPIMAT) 20 mcg-100 mcg inhalation spray  1 Puff Inhalation Q4H PRN    dexamethasone (DECADRON) 4 mg/mL injection 6 mg  6 mg IntraVENous Q24H    cefTRIAXone (ROCEPHIN) 2 g in sterile water (preservative free) 20 mL IV syringe  2 g IntraVENous Q24H    aspirin chewable tablet 81 mg  81 mg Oral DAILY    sertraline (ZOLOFT) tablet 25 mg  25 mg Oral DAILY    cholecalciferol (VITAMIN D3) (1000 Units /25 mcg) tablet 2,000 Units  2,000 Units Oral DAILY    atorvastatin (LIPITOR) tablet 20 mg  20 mg Oral QHS    enoxaparin (LOVENOX) injection 40 mg  40 mg SubCUTAneous Q24H    famotidine (PEPCID) tablet 20 mg  20 mg Oral BID       Assessment/Plan     Active Problems:    GERD (gastroesophageal reflux disease) (2/10/2014)      Hypertension (2/10/2014)      Hypothyroid (2/10/2014)      Respiratory failure with hypoxia ([facility] [address] (7/11/2021)      COVID-19 (7/11/2021)    COVID-19 positive testwith Acute Pneumonia   Continue Rocephin 1 g IV  Follow-up D-dimer daily- .45 (WNL)  Follow-up CRP daily- 1.2 (WNL)  Follow-up procalcitonin daily- <0.05 (WNL)  Follow-up blood culture- pending  Continue Decadron  D/c Remdesivir  Monitor oxygenation, inflammatory markers,      Acute respiratory failure/COPD  Continue O2 by nasal cannula 2 L/min  Combivent inhaler 2 puffs every every 4 hour  Check cardiac enzymes every 8 hour- (WNL) ; CK-MG 3.0; Troponin I <0.02   Check EKG- pending  Patient will need evaluation for O2 discharge     UTI  Continue Rocephin 1 g IV daily  Follow-up urine culture - pending  Will switch to oral antibiotics based on urine cultures, clinical course. Hypothyroid  Check TSH and free T4; TSH (WNL) . 55, Free T4 (WNL) 1.2  Patient on Synthroid 137 mcg daily at home     Vitamin D deficiency  Check vitamin D level- pending  Vitamin D3 2000 units daily     Hypertension  Start Norvasc 5 mg daily  Patient not on meds at home  Continue to monitor blood pressure hydralazine 10 mg IV every 6 hours as needed systolic blood pressure above 160      Cbc, cmp, mag in AM   DVT/GI Prophylaxis: Lovenox and H2B/PPI    Patt Lucas  7/13/2021 7:08 AM

## 2021-07-13 NOTE — PROGRESS NOTES
D/C orders received. No needs identified by . Chart reviewed. Pt will be transported home by [facility] cab.  available as needed.     Kimberlee Quintero RN BSN  Care Manager  [phone]

## 2021-07-13 NOTE — PROGRESS NOTES
Problem: Activity Intolerance  Goal: *Oxygen saturation during activity within specified parameters  Outcome: Progressing Towards Goal  Goal: *Able to remain out of bed as prescribed  Outcome: Progressing Towards Goal     Problem: Patient Education: Go to Patient Education Activity  Goal: Patient/Family Education  Outcome: Progressing Towards Goal     Problem: Falls - Risk of  Goal: *Absence of Falls  Description: Document Bernabe Soria Fall Risk and appropriate interventions in the flowsheet. Outcome: Progressing Towards Goal  Note: Fall Risk Interventions:            Medication Interventions: Evaluate medications/consider consulting pharmacy, Patient to call before getting OOB, Teach patient to arise slowly                   Problem: Patient Education: Go to Patient Education Activity  Goal: Patient/Family Education  Outcome: Progressing Towards Goal     Problem: Airway Clearance - Ineffective  Goal: Achieve or maintain patent airway  Outcome: Progressing Towards Goal     Problem: Gas Exchange - Impaired  Goal: Absence of hypoxia  Outcome: Progressing Towards Goal  Goal: Promote optimal lung function  Outcome: Progressing Towards Goal     Problem: Breathing Pattern - Ineffective  Goal: Ability to achieve and maintain a regular respiratory rate  Outcome: Progressing Towards Goal     Problem:  Body Temperature -  Risk of, Imbalanced  Goal: Ability to maintain a body temperature within defined limits  Outcome: Progressing Towards Goal  Goal: Will regain or maintain usual level of consciousness  Outcome: Progressing Towards Goal  Goal: Complications related to the disease process, condition or treatment will be avoided or minimized  Outcome: Progressing Towards Goal     Problem: Isolation Precautions - Risk of Spread of Infection  Goal: Prevent transmission of infectious organism to others  Outcome: Progressing Towards Goal     Problem: Nutrition Deficits  Goal: Optimize nutrtional status  Outcome: Progressing Towards Goal     Problem: Risk for Fluid Volume Deficit  Goal: Maintain normal heart rhythm  Outcome: Progressing Towards Goal  Goal: Maintain absence of muscle cramping  Outcome: Progressing Towards Goal  Goal: Maintain normal serum potassium, sodium, calcium, phosphorus, and pH  Outcome: Progressing Towards Goal     Problem: Loneliness or Risk for Loneliness  Goal: Demonstrate positive use of time alone when socialization is not possible  Outcome: Progressing Towards Goal     Problem: Fatigue  Goal: Verbalize increase energy and improved vitality  Outcome: Progressing Towards Goal     Problem: Patient Education: Go to Patient Education Activity  Goal: Patient/Family Education  Outcome: Progressing Towards Goal

## 2021-07-13 NOTE — DISCHARGE INSTRUCTIONS
DISCHARGE SUMMARY from Nurse    PATIENT INSTRUCTIONS:    After general anesthesia or intravenous sedation, for 24 hours or while taking prescription Narcotics:  · Limit your activities  · Do not drive and operate hazardous machinery  · Do not make important personal or business decisions  · Do  not drink alcoholic beverages  · If you have not urinated within 8 hours after discharge, please contact your surgeon on call. Report the following to your surgeon:  · Excessive pain, swelling, redness or odor of or around the surgical area  · Temperature over 100.5  · Nausea and vomiting lasting longer than 4 hours or if unable to take medications  · Any signs of decreased circulation or nerve impairment to extremity: change in color, persistent  numbness, tingling, coldness or increase pain  · Any questions    What to do at Home:  Recommended activity: Activity as tolerated, ***    If you experience any of the following symptoms ***,chest pain, shortness of breath, nausea/vomiting,fever, pain unrelieved by medication please follow up with Dr. Evy Gonzalez. *  Please give a list of your current medications to your Primary Care Provider. *  Please update this list whenever your medications are discontinued, doses are      changed, or new medications (including over-the-counter products) are added. *  Please carry medication information at all times in case of emergency situations. These are general instructions for a healthy lifestyle:    No smoking/ No tobacco products/ Avoid exposure to second hand smoke  Surgeon General's Warning:  Quitting smoking now greatly reduces serious risk to your health.     Obesity, smoking, and sedentary lifestyle greatly increases your risk for illness    A healthy diet, regular physical exercise & weight monitoring are important for maintaining a healthy lifestyle    You may be retaining fluid if you have a history of heart failure or if you experience any of the following symptoms:  Weight gain of 3 pounds or more overnight or 5 pounds in a week, increased swelling in our hands or feet or shortness of breath while lying flat in bed. Please call your doctor as soon as you notice any of these symptoms; do not wait until your next office visit. Patient armband removed and shredded  . mychart  The discharge information has been reviewed with the {PATIENT PARENT GUARDIAN:[id number]}. The {PATIENT PARENT GUARDIAN:06187} verbalized understanding. Discharge medications reviewed with the {Dishcar meds reviewed T} and appropriate educational materials and side effects teaching were provided.   ___________________________________________________________________________________________________________________________________

## 2021-07-13 NOTE — PROGRESS NOTES
Per Ronny (DREW) called [facility] at [phone] scheduled transport to patient's home ([address] [place] [address], [address]) with Stuart Galvan and received confirmation number [id number]. Stuart Galvan stated due to patient's isolation, mode of transport can't be determined at this time. She or the  will call the nurse's station with details. Transport can span up to 3 hours. Informed Ronny of transportation conversation and arrangements.

## 2021-07-13 NOTE — PROGRESS NOTES
Reason for Admission:  Respiratory failure with hypoxia ([facility] [address] [J96.91]  COVID-19 [U07.1]                 RUR Score:    9            Plan for utilizing home health:    no                      Likelihood of Readmission:   LOW                         Transition of Care Plan:              Initial assessment completed with patient. Cognitive status of patient: oriented to time, place, person and situation. Face sheet information confirmed:  yes. The patient designates mother to participate in her discharge plan and to receive any needed information. This patient lives in a single family home with patient and mother. Patient is able to navigate steps as needed. Prior to hospitalization, patient was considered to be independent with ADLs/IADLS : yes . Patient has a current ACP document on file: no      Healthcare Decision Maker:     Click here to complete [address] including selection of the Healthcare Decision Maker Relationship (ie \"Primary\")    The patient and mother will be available to transport patient home upon discharge. The patient already has none reported,  medical equipment available in the home. Patient is not currently active with home health. Patient has not stayed in a skilled nursing facility or rehab. This patient is on dialysis :no    Currently, the discharge plan is Home. The patient states that she can obtain her medications from the pharmacy, and take her medications as directed. Patient's current insurance is [facility]       Care Management Interventions  PCP Verified by CM:  Yes  Mode of Transport at Discharge: Self  Current Support Network: [address] Follow Up Transport: Family  The Plan for Transition of Care is Related to the Following Treatment Goals : Home  Discharge Location  Discharge Placement: Home        Paul Varma RN BSN  Care Manager  [phone]

## 2021-07-13 NOTE — PROGRESS NOTES
Requested Case Management specialist to assist with transportation to [address] ,[address] [facility]. Patient will require cab transport. Pt requires Cab   Patient is currently requiring oxygen No    Pt is on isolation: Yes Isolation is for: COVID  Is the pt ready now? yes  Requested time: Next Available  PCS Faxed: no  Insurance verified on face sheet: yes  Auth needed for transport: yes  CM completed PCS/ Envelope and placed on chart.      Joelle Saleh RN BSN  Care Manager  [phone]

## 2021-07-13 NOTE — ROUTINE PROCESS
Bedside and Verbal shift change report given to Alanis Trent RN (oncoming nurse) by Charly Soliman RN   (offgoing nurse). Report included the following information SBAR, Kardex, Intake/Output, MAR and Recent Results.

## 2021-07-14 ENCOUNTER — PATIENT OUTREACH (OUTPATIENT)
Dept: CASE MANAGEMENT | Age: 61
End: 2021-07-14

## 2021-07-14 LAB
BACTERIA SPEC CULT: ABNORMAL
CC UR VC: ABNORMAL
SERVICE CMNT-IMP: ABNORMAL

## 2021-07-14 NOTE — PROGRESS NOTES
Care Transitions Initial Call    Call within 2 business days of discharge: Yes     Patient: April PATRICIA Peter Patient : 1960 MRN: [id number]    Last Discharge [address]       Complaint Diagnosis Description Type Department Provider    21 Flu Like Symptoms COVID-19 . .. ED to Hosp-Admission (Discharged) (ADMIT) Luis Johnson MD; Ahmet Leal... Was this an external facility discharge? No [facility]    Challenges to be reviewed by the provider   Additional needs identified to be addressed with provider: no  none         Method of communication with provider : none    Discussed COVID-19 related testing which was available at this time. Test results were positive. Patient informed of results, if available? yes     Advance Care Planning:   Does patient have an Advance Directive: decision makers updated. Inpatient Readmission Risk score: Unplanned Readmit Risk Score: 10    Was this a readmission? no   Patient stated reason for the admission: sinus infection and respiratory failure with hypoxia    Patients top risk factors for readmission: medical condition-respiratory failure with hypoxia and support system   Interventions to address risk factors: Scheduled appointment with PCP-stated that he has an appt today    Care Transition Nurse (CTN) contacted the patient by telephone to perform post hospital discharge assessment. Verified name and  with patient as identifiers. Provided introduction to self, and explanation of the CTN role. CTN reviewed discharge instructions, medical action plan and red flags with patient who verbalized understanding. Were discharge instructions available to patient? yes. Reviewed appropriate site of care based on symptoms and resources available to patient including: PCP and MyChart Messaging.  Patient given an opportunity to ask questions and does not have any further questions or concerns at this time. The patient agrees to contact the PCP office for questions related to their healthcare. Medication reconciliation was performed with patient, who verbalizes understanding of administration of home medications. Advised obtaining a 90-day supply of all daily and as-needed medications. Referral to Pharm D needed: no     Home Health/Outpatient orders at discharge: [address]: n/a  Date of initial visit: [address] [place] [address] ordered at discharge: None  [address] [facility] [address]: [address] received: n/a    Covid Risk Education    Educated patient about risk for severe COVID-19 due to risk factors according to CDC guidelines. CTN reviewed discharge instructions, medical action plan and red flag symptoms with the patient who verbalized understanding. Discussed COVID vaccination status: no. Education provided on COVID-19 vaccination as appropriate. Discussed exposure protocols and quarantine with CDC Guidelines. Patient was given an opportunity to verbalize any questions and concerns and agrees to contact CTN or health care provider for questions related to their healthcare. Was patient discharged with a pulse oximeter? no. Discussed and confirmed pulse oximeter discharge instructions and when to notify provider or seek emergency care. Discussed follow-up appointments. If no appointment was previously scheduled, appointment scheduling offered: yes. Is follow up appointment scheduled within 7 days of discharge? yes. [facility] follow up appointment(s):   Future Appointments   Date Time Provider Richard Falcon   7/27/2021  8:00 AM HBV CHRISTIN RM 4 3D HBVRMAM [facility]   7/27/2021  8:30 AM HBV BONE DEXA RM 1 HBVRBD HBV     Non-[facility] follow up appointment(s): Appt today    Plan for follow-up call in 10-14 days based on severity of symptoms and risk factors. Plan for next call: medication management-ensure patient has all meds and is taking as prescribed. CTN provided contact information for future needs. Goals Addressed                 This Visit's Progress     Prevent complications post hospitalization. 1. CTN will monitor X 4 weeks    2. Ensure provider appt is scheduled within 7 days post-discharge 7/14/2021 Patient stated that she has an appt today for follow up. 3. Confirm patient attended post-discharge provider apt    4. Complete post-visit call to confirm attendance and update care needs  7/14/2021 Patient stated that she feels so much better. She appreciated the care received while an inpatient. No care needs noted. Patient stated that she is getting stronger and more energy. 5. Review/educate common or potential \"red flags\" of condition worsening 7/14/2021 Reviewed signs/symptoms of respiratory failure such as restlessness, anxiety, sleepiness, loss of consciousness, rapid and shallow breathing, racing heart, irregular heartbeats, and profuse sweating to name a few. 6. Evaluate adherence to medications and priority barriers to resolve 7/14/2021 Patient stated that she has all meds and is taking as prescribed. 7. Instruct on adherence to medications as ordered and assess for therapeutic response and side-effects  7/14/2021 Patient is aware of why she takes each medication and knows when she needs to call physician for any issues. 8. Discuss and evaluate ADL performance. Provide recommendations on energy conservation, particularly related to transition home from an inpatient admission. 7/14/2021 Patient stated that she is getting stronger each day. She stated that the medications given to her were really working. She plans to get back to work next week.

## 2021-07-14 NOTE — PROGRESS NOTES
Care Transitions Initial Call    Call within 2 business days of discharge: Yes     Patient: Dayan Peter Patient : 1960 MRN: [id number]    Last Discharge [address]       Complaint Diagnosis Description Type Department Provider    21 Flu Like Symptoms COVID-19 . .. ED to Hosp-Admission (Discharged) (ADMIT) Adalid Espino MD; Burton Lanes,... Was this an external facility discharge? No Discharge Facility: [facility] 2021 to 2021    CTN reviewed patient chart and noted that she was admitted for respiratory failure with hypoxia and COVID positive. Patient was unavailable at the time of the call. Message was left for patient. CTN contact information was provided in message that was left. Requested that patient return my call at her earliest convenience. Awaiting call back.

## 2021-07-17 LAB
BACTERIA SPEC CULT: NORMAL
BACTERIA SPEC CULT: NORMAL
SERVICE CMNT-IMP: NORMAL
SERVICE CMNT-IMP: NORMAL

## 2021-07-21 ENCOUNTER — HOSPITAL ENCOUNTER (EMERGENCY)
Dept: CT IMAGING | Age: 61
Discharge: HOME | DRG: 137 | End: 2021-07-21
Attending: EMERGENCY MEDICINE
Payer: MEDICAID

## 2021-07-21 ENCOUNTER — APPOINTMENT (OUTPATIENT)
Dept: GENERAL RADIOLOGY | Age: 61
DRG: 137 | End: 2021-07-21
Attending: EMERGENCY MEDICINE
Payer: MEDICAID

## 2021-07-21 ENCOUNTER — HOSPITAL ENCOUNTER (INPATIENT)
Age: 61
LOS: 4 days | Discharge: HOME HEALTH | DRG: 137 | End: 2021-07-25
Attending: EMERGENCY MEDICINE | Admitting: FAMILY MEDICINE
Payer: MEDICAID

## 2021-07-21 DIAGNOSIS — U07.1 COVID-19: ICD-10-CM

## 2021-07-21 DIAGNOSIS — J96.01 ACUTE RESPIRATORY FAILURE WITH HYPOXIA (HCC): Primary | ICD-10-CM

## 2021-07-21 LAB
ALBUMIN SERPL-MCNC: 2.8 G/DL (ref 3.4–5)
ALBUMIN/GLOB SERPL: 0.7 {RATIO} (ref 0.8–1.7)
ALP SERPL-CCNC: 92 U/L (ref 45–117)
ALT SERPL-CCNC: 59 U/L (ref 13–56)
ANION GAP SERPL CALC-SCNC: 6 MMOL/L (ref 3–18)
AST SERPL-CCNC: 55 U/L (ref 10–38)
B PERT DNA SPEC QL NAA+PROBE: NOT DETECTED
BASOPHILS # BLD: 0 K/UL (ref 0–0.1)
BASOPHILS NFR BLD: 0 % (ref 0–2)
BILIRUB DIRECT SERPL-MCNC: 0.3 MG/DL (ref 0–0.2)
BILIRUB SERPL-MCNC: 1.5 MG/DL (ref 0.2–1)
BORDETELLA PARAPERTUSSIS PCR, BORPAR: NOT DETECTED
BUN SERPL-MCNC: 11 MG/DL (ref 7–18)
BUN/CREAT SERPL: 18 (ref 12–20)
C PNEUM DNA SPEC QL NAA+PROBE: NOT DETECTED
CALCIUM SERPL-MCNC: 8.8 MG/DL (ref 8.5–10.1)
CHLORIDE SERPL-SCNC: 105 MMOL/L (ref 100–111)
CK MB CFR SERPL CALC: NORMAL % (ref 0–4)
CK MB SERPL-MCNC: <1 NG/ML (ref 5–25)
CK SERPL-CCNC: 135 U/L (ref 26–192)
CO2 SERPL-SCNC: 26 MMOL/L (ref 21–32)
CREAT SERPL-MCNC: 0.62 MG/DL (ref 0.6–1.3)
CRP SERPL-MCNC: 9 MG/DL (ref 0–0.3)
D DIMER PPP FEU-MCNC: 1.23 UG/ML(FEU)
DIFFERENTIAL METHOD BLD: ABNORMAL
EOSINOPHIL # BLD: 0.1 K/UL (ref 0–0.4)
EOSINOPHIL NFR BLD: 1 % (ref 0–5)
ERYTHROCYTE [DISTWIDTH] IN BLOOD BY AUTOMATED COUNT: 13.2 % (ref 11.6–14.5)
FLUAV SUBTYP SPEC NAA+PROBE: NOT DETECTED
FLUBV RNA SPEC QL NAA+PROBE: NOT DETECTED
GLOBULIN SER CALC-MCNC: 4.2 G/DL (ref 2–4)
GLUCOSE SERPL-MCNC: 108 MG/DL (ref 74–99)
HADV DNA SPEC QL NAA+PROBE: NOT DETECTED
HCOV 229E RNA SPEC QL NAA+PROBE: NOT DETECTED
HCOV HKU1 RNA SPEC QL NAA+PROBE: NOT DETECTED
HCOV NL63 RNA SPEC QL NAA+PROBE: NOT DETECTED
HCOV OC43 RNA SPEC QL NAA+PROBE: NOT DETECTED
HCT VFR BLD AUTO: 40 % (ref 35–45)
HGB BLD-MCNC: 14.3 G/DL (ref 12–16)
HMPV RNA SPEC QL NAA+PROBE: NOT DETECTED
HPIV1 RNA SPEC QL NAA+PROBE: NOT DETECTED
HPIV2 RNA SPEC QL NAA+PROBE: NOT DETECTED
HPIV3 RNA SPEC QL NAA+PROBE: NOT DETECTED
HPIV4 RNA SPEC QL NAA+PROBE: NOT DETECTED
LYMPHOCYTES # BLD: 1.6 K/UL (ref 0.9–3.6)
LYMPHOCYTES NFR BLD: 15 % (ref 21–52)
M PNEUMO DNA SPEC QL NAA+PROBE: NOT DETECTED
MAGNESIUM SERPL-MCNC: 2.2 MG/DL (ref 1.6–2.6)
MCH RBC QN AUTO: 32.1 PG (ref 24–34)
MCHC RBC AUTO-ENTMCNC: 35.8 G/DL (ref 31–37)
MCV RBC AUTO: 89.9 FL (ref 74–97)
MONOCYTES # BLD: 1.8 K/UL (ref 0.05–1.2)
MONOCYTES NFR BLD: 17 % (ref 3–10)
NEUTS SEG # BLD: 7.5 K/UL (ref 1.8–8)
NEUTS SEG NFR BLD: 68 % (ref 40–73)
PLATELET # BLD AUTO: 142 K/UL (ref 135–420)
PMV BLD AUTO: 13.6 FL (ref 9.2–11.8)
POTASSIUM SERPL-SCNC: 2.6 MMOL/L (ref 3.5–5.5)
PROCALCITONIN SERPL-MCNC: <0.05 NG/ML
PROT SERPL-MCNC: 7 G/DL (ref 6.4–8.2)
RBC # BLD AUTO: 4.45 M/UL (ref 4.2–5.3)
RSV RNA SPEC QL NAA+PROBE: NOT DETECTED
RV+EV RNA SPEC QL NAA+PROBE: NOT DETECTED
SARS-COV-2 PCR, COVPCR: DETECTED
SODIUM SERPL-SCNC: 137 MMOL/L (ref 136–145)
TROPONIN I SERPL-MCNC: <0.02 NG/ML (ref 0–0.04)
WBC # BLD AUTO: 11.1 K/UL (ref 4.6–13.2)

## 2021-07-21 PROCEDURE — 84145 PROCALCITONIN (PCT): CPT

## 2021-07-21 PROCEDURE — 80048 BASIC METABOLIC PNL TOTAL CA: CPT

## 2021-07-21 PROCEDURE — 87086 URINE CULTURE/COLONY COUNT: CPT

## 2021-07-21 PROCEDURE — 96365 THER/PROPH/DIAG IV INF INIT: CPT

## 2021-07-21 PROCEDURE — 87040 BLOOD CULTURE FOR BACTERIA: CPT

## 2021-07-21 PROCEDURE — 86140 C-REACTIVE PROTEIN: CPT

## 2021-07-21 PROCEDURE — 87077 CULTURE AEROBIC IDENTIFY: CPT

## 2021-07-21 PROCEDURE — 74011000250 HC RX REV CODE- 250: Performed by: EMERGENCY MEDICINE

## 2021-07-21 PROCEDURE — 85379 FIBRIN DEGRADATION QUANT: CPT

## 2021-07-21 PROCEDURE — 87186 SC STD MICRODIL/AGAR DIL: CPT

## 2021-07-21 PROCEDURE — 74011000636 HC RX REV CODE- 636: Performed by: EMERGENCY MEDICINE

## 2021-07-21 PROCEDURE — 96368 THER/DIAG CONCURRENT INF: CPT

## 2021-07-21 PROCEDURE — 96375 TX/PRO/DX INJ NEW DRUG ADDON: CPT

## 2021-07-21 PROCEDURE — 74011250636 HC RX REV CODE- 250/636: Performed by: EMERGENCY MEDICINE

## 2021-07-21 PROCEDURE — 85025 COMPLETE CBC W/AUTO DIFF WBC: CPT

## 2021-07-21 PROCEDURE — 71275 CT ANGIOGRAPHY CHEST: CPT

## 2021-07-21 PROCEDURE — 74011250637 HC RX REV CODE- 250/637: Performed by: FAMILY MEDICINE

## 2021-07-21 PROCEDURE — 82553 CREATINE MB FRACTION: CPT

## 2021-07-21 PROCEDURE — 74011250636 HC RX REV CODE- 250/636: Performed by: FAMILY MEDICINE

## 2021-07-21 PROCEDURE — 83735 ASSAY OF MAGNESIUM: CPT

## 2021-07-21 PROCEDURE — 74011250637 HC RX REV CODE- 250/637: Performed by: EMERGENCY MEDICINE

## 2021-07-21 PROCEDURE — 99285 EMERGENCY DEPT VISIT HI MDM: CPT

## 2021-07-21 PROCEDURE — 2709999900 HC NON-CHARGEABLE SUPPLY

## 2021-07-21 PROCEDURE — 71045 X-RAY EXAM CHEST 1 VIEW: CPT

## 2021-07-21 PROCEDURE — 65660000000 HC RM CCU STEPDOWN

## 2021-07-21 PROCEDURE — 96366 THER/PROPH/DIAG IV INF ADDON: CPT

## 2021-07-21 PROCEDURE — 80076 HEPATIC FUNCTION PANEL: CPT

## 2021-07-21 PROCEDURE — 93005 ELECTROCARDIOGRAM TRACING: CPT

## 2021-07-21 PROCEDURE — 0202U NFCT DS 22 TRGT SARS-COV-2: CPT

## 2021-07-21 RX ORDER — FAMOTIDINE 20 MG/1
20 TABLET, FILM COATED ORAL 2 TIMES DAILY
Status: DISCONTINUED | OUTPATIENT
Start: 2021-07-21 | End: 2021-07-25 | Stop reason: HOSPADM

## 2021-07-21 RX ORDER — POTASSIUM CHLORIDE 7.45 MG/ML
10 INJECTION INTRAVENOUS
Status: COMPLETED | OUTPATIENT
Start: 2021-07-21 | End: 2021-07-21

## 2021-07-21 RX ORDER — GUAIFENESIN 100 MG/5ML
81 LIQUID (ML) ORAL DAILY
Status: DISCONTINUED | OUTPATIENT
Start: 2021-07-22 | End: 2021-07-25 | Stop reason: HOSPADM

## 2021-07-21 RX ORDER — DEXAMETHASONE SODIUM PHOSPHATE 4 MG/ML
6 INJECTION, SOLUTION INTRA-ARTICULAR; INTRALESIONAL; INTRAMUSCULAR; INTRAVENOUS; SOFT TISSUE EVERY 24 HOURS
Status: DISCONTINUED | OUTPATIENT
Start: 2021-07-21 | End: 2021-07-23

## 2021-07-21 RX ORDER — VANCOMYCIN 2 GRAM/500 ML IN 0.9 % SODIUM CHLORIDE INTRAVENOUS
2000
Status: COMPLETED | OUTPATIENT
Start: 2021-07-21 | End: 2021-07-21

## 2021-07-21 RX ORDER — IPRATROPIUM BROMIDE AND ALBUTEROL SULFATE 2.5; .5 MG/3ML; MG/3ML
3 SOLUTION RESPIRATORY (INHALATION) ONCE
Status: ACTIVE | OUTPATIENT
Start: 2021-07-21 | End: 2021-07-21

## 2021-07-21 RX ORDER — ATORVASTATIN CALCIUM 20 MG/1
20 TABLET, FILM COATED ORAL
Status: DISCONTINUED | OUTPATIENT
Start: 2021-07-21 | End: 2021-07-25 | Stop reason: HOSPADM

## 2021-07-21 RX ORDER — ENOXAPARIN SODIUM 100 MG/ML
40 INJECTION SUBCUTANEOUS EVERY 24 HOURS
Status: DISCONTINUED | OUTPATIENT
Start: 2021-07-21 | End: 2021-07-25 | Stop reason: HOSPADM

## 2021-07-21 RX ORDER — ALBUTEROL SULFATE 90 UG/1
2 AEROSOL, METERED RESPIRATORY (INHALATION)
Status: DISCONTINUED | OUTPATIENT
Start: 2021-07-21 | End: 2021-07-25 | Stop reason: HOSPADM

## 2021-07-21 RX ORDER — POTASSIUM CHLORIDE 20 MEQ/1
40 TABLET, EXTENDED RELEASE ORAL
Status: COMPLETED | OUTPATIENT
Start: 2021-07-21 | End: 2021-07-21

## 2021-07-21 RX ORDER — LEVOTHYROXINE SODIUM 125 UG/1
125 TABLET ORAL
Status: DISCONTINUED | OUTPATIENT
Start: 2021-07-22 | End: 2021-07-25 | Stop reason: HOSPADM

## 2021-07-21 RX ORDER — ALBUTEROL SULFATE 90 UG/1
1 AEROSOL, METERED RESPIRATORY (INHALATION)
Status: COMPLETED | OUTPATIENT
Start: 2021-07-21 | End: 2021-07-21

## 2021-07-21 RX ADMIN — ALBUTEROL SULFATE 2 PUFF: 108 INHALANT RESPIRATORY (INHALATION) at 12:57

## 2021-07-21 RX ADMIN — DEXAMETHASONE SODIUM PHOSPHATE 6 MG: 4 INJECTION, SOLUTION INTRAMUSCULAR; INTRAVENOUS at 21:29

## 2021-07-21 RX ADMIN — METHYLPREDNISOLONE SODIUM SUCCINATE 125 MG: 125 INJECTION, POWDER, FOR SOLUTION INTRAMUSCULAR; INTRAVENOUS at 11:04

## 2021-07-21 RX ADMIN — VANCOMYCIN HYDROCHLORIDE 2000 MG: 10 INJECTION, POWDER, LYOPHILIZED, FOR SOLUTION INTRAVENOUS at 12:58

## 2021-07-21 RX ADMIN — ALBUTEROL SULFATE 1 PUFF: 108 INHALANT RESPIRATORY (INHALATION) at 11:14

## 2021-07-21 RX ADMIN — ENOXAPARIN SODIUM 40 MG: 40 INJECTION SUBCUTANEOUS at 21:30

## 2021-07-21 RX ADMIN — POTASSIUM CHLORIDE 10 MEQ: 7.46 INJECTION, SOLUTION INTRAVENOUS at 12:58

## 2021-07-21 RX ADMIN — IOPAMIDOL 100 ML: 755 INJECTION, SOLUTION INTRAVENOUS at 12:45

## 2021-07-21 RX ADMIN — FAMOTIDINE 20 MG: 20 TABLET ORAL at 21:30

## 2021-07-21 RX ADMIN — ATORVASTATIN CALCIUM 20 MG: 20 TABLET, FILM COATED ORAL at 21:30

## 2021-07-21 RX ADMIN — CEFEPIME HYDROCHLORIDE 2 G: 2 INJECTION, POWDER, FOR SOLUTION INTRAVENOUS at 11:04

## 2021-07-21 RX ADMIN — POTASSIUM CHLORIDE 40 MEQ: 1500 TABLET, EXTENDED RELEASE ORAL at 11:51

## 2021-07-21 RX ADMIN — AZITHROMYCIN 500 MG: 500 INJECTION, POWDER, LYOPHILIZED, FOR SOLUTION INTRAVENOUS at 11:04

## 2021-07-21 RX ADMIN — ALBUTEROL SULFATE 2 PUFF: 108 INHALANT RESPIRATORY (INHALATION) at 11:51

## 2021-07-21 NOTE — ED NOTES
Report includes the following information SBAR, Kardex, MAR and Recent Results. SITUATION:    Code Status: No Order   Reason for Admission: Respiratory failure with hypoxia ([facility] [address] [J96.91]   COVID-19 [U07.1]    [facility] day: 0   Problem List:       Hospital Problems  Date Reviewed: 7/11/2021        Codes Class Noted POA    Respiratory failure with hypoxia [facility]) ICD-10-CM: J96.91  ICD-9-CM: 518.81  7/11/2021 Unknown        COVID-19 ICD-10-CM: U07.1  ICD-9-CM: 079.89  7/11/2021 Unknown              BACKGROUND:    Past Medical History:   Past Medical History:   Diagnosis Date    Cancer ([facility] [address]     Ovarian cancer ([facility] [address]     Thyroid disease          Patient taking anticoagulants no    ASSESSMENT:    Changes in Assessment Throughout Shift: new o2 requirement     Patient has Central Line: no   Patient has [place] Cath: no      Last Vitals:     Vitals:    07/21/21 1630 07/21/21 1700 07/21/21 1730 07/21/21 1800   BP: 120/76 129/77 121/68 129/76   Pulse: 65 66 64 61   Resp: 23 24 22 18   Temp:    98 °F (36.7 °C)   SpO2: 94% 94% 94% 94%   Weight:       Height:            IV and DRAINS (will only show if present)         WOUND (if present)   none     PAIN    Pain Assessment    Pain Intensity 1: 3 (07/21/21 1014)                 o Interventions for Pain:  none  o Intervention effective: n/a  o Time of last intervention: n/a  o Reassessment Completed: yes 0/10      Last 3 Weights:  Last 3 Recorded Weights in this Encounter    07/21/21 1056   Weight: 97.5 kg (215 lb)     Weight change:      INTAKE/OUPUT    Current Shift: No intake/output data recorded. Last three shifts: No intake/output data recorded.  LAB RESULTS     Recent Labs     07/21/21  1028   WBC 11.1   HGB 14.3   HCT 40.0           Recent Labs     07/21/21  1028      K 2.6*   *   BUN 11   CREA 0.62   CA 8.8   MG 2.2       RECOMMENDATIONS AND DISCHARGE PLANNING     1.  Pending tests/procedures/ Plan of Care or Other Needs: unknown    Discharge plan for patient and Needs/Barriers: unknown     2. Estimated Discharge Date: unknown    4. The patient's care plan was reviewed with the oncoming nurse. \"HEALS\" SAFETY CHECK      Fall Risk         Safety Measures:      A safety check occurred in the patient's room between off going nurse and oncoming nurse listed above. The safety check included the below items  Area Items   H  High Alert Medications - Verify all high alert medication drips (heparin, PCA, etc.)   E  Equipment - Suction is set up for ALL patients (with naren)  - Red plugs utilized for all equipment (IV pumps, etc.)  - WOWs wiped down at end of shift.  - Room stocked with oxygen, suction, and other unit-specific supplies   A  Alarms - Bed alarm is set for fall risk patients  - Ensure chair alarm is in place and activated if patient is up in a chair   L  Lines - Check IV for any infiltration  - [place] bag is empty if patient has a [place]de   - Tubing and IV bags are labeled   S  Safety   - Room is clean, patient is clean, and equipment is clean. - Hallways are clear from equipment besides carts. - Fall bracelet on for fall risk patients  - Ensure room is clear and free of clutter  - Suction is set up for ALL patients (with naren)  - Hallways are clear from equipment besides carts.    - Isolation precautions followed, supplies available outside room, sign posted     Tushar Casey RN

## 2021-07-21 NOTE — ED PROVIDER NOTES
EMERGENCY DEPARTMENT HISTORY AND PHYSICAL EXAM    10:43 AM  Date: 7/21/2021  Patient Name: Dayan Peter    History of Presenting Illness       History Provided By: patient and PCP Dr. Nils Alpers    HPI: Dayan Ritchie is a 57 y.o. female with past medical history of hyperlipidemia, COPD, anxiety, hypothyroidism presents with worsening shortness of breath. Patient was admitted for positive Covid and UTI on July 11th and discharged however according to patient's PCP and patient she has been feeling progressively more short of breath. Patient was hypoxic at the primary doctor's office. Patient states she has some gray phlegm when she coughs. Denies any fever. PCP: Minerva Lozoya MD    Past History     Past Medical History:  Past Medical History:   Diagnosis Date    Cancer ([facility] [address]     Ovarian cancer ([facility] [address]     Thyroid disease        Past Surgical History:  Past Surgical History:   Procedure Laterality Date    COLONOSCOPY N/A 5/21/2019    COLONOSCOPY, SCREENING with hot snare polypectomy, w/ Bx performed by Andre Rubio MD at [address] [place] [address] HX HYSTERECTOMY         Family History:  Family History   Problem Relation Age of Onset    Thyroid Disease Mother     Breast Cancer Mother     Cancer Brother 24        brain    Cancer Maternal Aunt         lung    Breast Problems Maternal Aunt 29    Breast Cancer Maternal Aunt     Cancer Paternal Aunt     Cancer Paternal Uncle     Cancer Maternal Grandmother     Breast Cancer Maternal Grandmother     Cancer Maternal Grandfather     Cancer Other         child leukemia    Heart Attack Other        Social History:  Social History     Tobacco Use    Smoking status: Current Every Day Smoker    Smokeless tobacco: Never Used    Tobacco comment: smokes one cigarette day   Substance Use Topics    Alcohol use: No    Drug use: No       Allergies:   Allergies   Allergen Reactions    Latex Hives    Morphine Anaphylaxis    Adhesive Hives    Augmentin [Amoxicillin-Pot Clavulanate] Angioedema     Facial swelling    Seafood Hives and Nausea Only     Shellfish       Review of Systems   Review of Systems   Constitutional: Negative for activity change, appetite change and chills. HENT: Negative for congestion, ear discharge, ear pain and sore throat. Eyes: Negative for photophobia and pain. Respiratory: Positive for cough and shortness of breath. Negative for choking. Cardiovascular: Negative for palpitations and leg swelling. Gastrointestinal: Negative for anal bleeding and rectal pain. Endocrine: Negative for polydipsia and polyuria. Genitourinary: Negative for genital sores and urgency. Musculoskeletal: Negative for arthralgias and myalgias. Neurological: Negative for dizziness, seizures and speech difficulty. Psychiatric/Behavioral: Negative for hallucinations, self-injury and suicidal ideas. Physical Exam     Patient Vitals for the past 12 hrs:   Temp Pulse Resp BP SpO2   07/21/21 1030  69 18 115/73 94 %   07/21/21 1014 98.1 °F (36.7 °C) 70 18 124/74 90 %       Physical Exam  Vitals and nursing note reviewed. Constitutional:       Appearance: She is well-developed. HENT:      Head: Normocephalic and atraumatic. Eyes:      General:         Right eye: No discharge. Left eye: No discharge. Cardiovascular:      Rate and Rhythm: Normal rate and regular rhythm. Heart sounds: Normal heart sounds. No murmur heard. Pulmonary:      Effort: Pulmonary effort is normal. No respiratory distress. Breath sounds: No stridor. Examination of the right-upper field reveals wheezing. Examination of the left-upper field reveals wheezing. Examination of the right-middle field reveals wheezing. Examination of the left-middle field reveals wheezing. Examination of the right-lower field reveals wheezing. Examination of the left-lower field reveals wheezing. Wheezing present. No rales.    Chest:      Chest wall: No tenderness. Abdominal:      General: Bowel sounds are normal. There is no distension. Palpations: Abdomen is soft. Tenderness: There is no abdominal tenderness. There is no guarding or rebound. Musculoskeletal:         General: Normal range of motion. Cervical back: Normal range of motion and neck supple. Skin:     General: Skin is warm and dry. Neurological:      Mental Status: She is alert and oriented to person, place, and time. Diagnostic Study Results     Labs -  Recent Results (from the past 12 hour(s))   EKG, 12 LEAD, INITIAL    Collection Time: 07/21/21 10:19 AM   Result Value Ref Range    Ventricular Rate 73 BPM    Atrial Rate 73 BPM    P-R Interval 152 ms    QRS Duration 84 ms    Q-T Interval 460 ms    QTC Calculation (Bezet) 506 ms    Calculated P Axis 27 degrees    Calculated R Axis 15 degrees    Calculated T Axis -22 degrees    Diagnosis       Sinus rhythm with occasional premature ventricular complexes  Cannot rule out Anterior infarct , age undetermined  T wave abnormality, consider inferior ischemia  Abnormal ECG  When compared with ECG of 10-FEB-2020 11:37,  premature ventricular complexes are now present  Non-specific change in ST segment in Anterior leads  T wave inversion now evident in Inferior leads  Inverted T waves have replaced nonspecific T wave abnormality in Anterior   leads  QT has lengthened     CBC WITH AUTOMATED DIFF    Collection Time: 07/21/21 10:28 AM   Result Value Ref Range    WBC 11.1 4.6 - 13.2 K/uL    RBC 4.45 4.20 - 5.30 M/uL    HGB 14.3 12.0 - 16.0 g/dL    HCT 40.0 35.0 - 45.0 %    MCV 89.9 74.0 - 97.0 FL    MCH 32.1 24.0 - 34.0 PG    MCHC 35.8 31.0 - 37.0 g/dL    RDW 13.2 11.6 - 14.5 %    PLATELET 720 191 - 012 K/uL    MPV 13.6 (H) 9.2 - 11.8 FL    NEUTROPHILS 68 40 - 73 %    LYMPHOCYTES 15 (L) 21 - 52 %    MONOCYTES 17 (H) 3 - 10 %    EOSINOPHILS 1 0 - 5 %    BASOPHILS 0 0 - 2 %    ABS. NEUTROPHILS 7.5 1.8 - 8.0 K/UL    ABS.  LYMPHOCYTES 1.6 0.9 - 3.6 K/UL    ABS. MONOCYTES 1.8 (H) 0.05 - 1.2 K/UL    ABS. EOSINOPHILS 0.1 0.0 - 0.4 K/UL    ABS. BASOPHILS 0.0 0.0 - 0.1 K/UL    DF AUTOMATED     METABOLIC PANEL, BASIC    Collection Time: 07/21/21 10:28 AM   Result Value Ref Range    Sodium 137 136 - 145 mmol/L    Potassium 2.6 (LL) 3.5 - 5.5 mmol/L    Chloride 105 100 - 111 mmol/L    CO2 26 21 - 32 mmol/L    Anion gap 6 3.0 - 18 mmol/L    Glucose 108 (H) 74 - 99 mg/dL    BUN 11 7.0 - 18 MG/DL    Creatinine 0.62 0.6 - 1.3 MG/DL    BUN/Creatinine ratio 18 12 - 20      GFR est AA >60 >60 ml/min/1.73m2    GFR est non-AA >60 >60 ml/min/1.73m2    Calcium 8.8 8.5 - 10.1 MG/DL   MAGNESIUM    Collection Time: 07/21/21 10:28 AM   Result Value Ref Range    Magnesium 2.2 1.6 - 2.6 mg/dL   PROCALCITONIN    Collection Time: 07/21/21 10:28 AM   Result Value Ref Range    Procalcitonin <0.05 ng/mL   C REACTIVE PROTEIN, QT    Collection Time: 07/21/21 10:28 AM   Result Value Ref Range    C-Reactive protein 9.0 (H) 0 - 0.3 mg/dL   D DIMER    Collection Time: 07/21/21 10:28 AM   Result Value Ref Range    D DIMER 1.23 (H) <0.46 ug/ml(FEU)   HEPATIC FUNCTION PANEL    Collection Time: 07/21/21 10:28 AM   Result Value Ref Range    Protein, total 7.0 6.4 - 8.2 g/dL    Albumin 2.8 (L) 3.4 - 5.0 g/dL    Globulin 4.2 (H) 2.0 - 4.0 g/dL    A-G Ratio 0.7 (L) 0.8 - 1.7      Bilirubin, total 1.5 (H) 0.2 - 1.0 MG/DL    Bilirubin, direct 0.3 (H) 0.0 - 0.2 MG/DL    Alk.  phosphatase 92 45 - 117 U/L    AST (SGOT) 55 (H) 10 - 38 U/L    ALT (SGPT) 59 (H) 13 - 56 U/L   RESPIRATORY VIRUS PANEL W/COVID-19, PCR    Collection Time: 07/21/21 11:53 AM    Specimen: Nasopharyngeal   Result Value Ref Range    Adenovirus Not detected NOTD      Coronavirus 229E Not detected NOTD      Coronavirus HKU1 Not detected NOTD      Coronavirus CVNL63 Not detected NOTD      Coronavirus OC43 Not detected NOTD      SARS-CoV-2, PCR Detected (AA) NOTD      Metapneumovirus Not detected NOTD      Rhinovirus and Enterovirus Not detected NOTD      Influenza A Not detected NOTD      Influenza B Not detected NOTD      Parainfluenza 1 Not detected NOTD      Parainfluenza 2 Not detected NOTD      Parainfluenza 3 Not detected NOTD      Parainfluenza virus 4 Not detected NOTD      RSV by PCR Not detected NOTD      B. parapertussis, PCR Not detected NOTD      Bordetella pertussis - PCR Not detected NOTD      Chlamydophila pneumoniae DNA, QL, PCR Not detected NOTD      Mycoplasma pneumoniae DNA, QL, PCR Not detected NOTD         Radiologic Studies -   CTA CHEST W OR W WO CONT    Result Date: 7/21/2021  Negative for pulmonary emboli. . There has been significant progression of the diffuse pulmonary process which is most consistent with infection. Findings are nonspecific as to organism; Covid 19 is a  differential consideration All CT scans at this facility are performed using dose optimization technique as appropriate to the performed exam, to include automated exposure control, adjustment of the mA and/or kV according to patient's size (Including appropriate matching for site-specific examinations), or use of iterative reconstruction technique. XR CHEST PORT    Result Date: 7/21/2021  Interval development of patchy airspace disease in bilateral mid and lower lungs. Recommend follow-up study for interval improvement. Thank you for your referral.         Medical Decision Making     ED Course: Progress Notes, Reevaluation, and Consults:    10:43 AM Initial assessment performed. The patients presenting problems have been discussed, and they/their family are in agreement with the care plan formulated and outlined with them. I have encouraged them to ask questions as they arise throughout their visit.       Provider Notes (Medical Decision Making):   Patient with COPD and recent diagnosis and admission for Covid pneumonia presents with hypoxia  Patient saturating at the 87-88% on room air, tachypneic  Placed on 2 L of oxygen  Patient given albuterol treatment*3, methylprednisolone  Feels better on reassessment but still requires oxygen  Potassium 2.6 will be repleted  Noted elevated D-dimer  CTA chest negative for PE however progression of lung disease noted  Discussed with Dr. Margaret Thorne who recommended antibiotics covering for hospital-acquired pneumonia and lung sputum  Patient admitted to Dr. Denise Neumann for Covid pneumonia, hypoxia, COPD exacerbation      Critical Care:  CRITICAL CARE:  5:27 PM  I have spent 35 minutes of critical care time involved in lab review, consultations with specialist, family decision-making, and documentation. This time does not include separately billable procedures. During this entire length of time I was immediately available to the patient. Critical Care: The reason for providing this level of medical care for this critically ill patient was due a critical illness that impaired one or more vital organ systems such that there was a high probability of imminent or life threatening deterioration in the patients condition. This care involved high complexity decision making to assess, manipulate, and support vital system functions, to treat this degreee vital organ system failure and to prevent further life threatening deterioration of the patients condition. Vital Signs-Reviewed the patient's vital signs. Reviewed pt's pulse ox reading. Records Reviewed: old medical records  -I am the first provider for this patient.  -I reviewed the vital signs, available nursing notes, past medical history, past surgical history, family history and social history.     Current Facility-Administered Medications   Medication Dose Route Frequency Provider Last Rate Last Admin    albuterol-ipratropium (DUO-NEB) 2.5 MG-0.5 MG/3 ML  3 mL Nebulization ONCE Feliciano Corbett MD        albuterol-ipratropium (DUO-NEB) 2.5 MG-0.5 MG/3 ML  3 mL Nebulization ONCE Feliciano Corbett MD        albuterol-ipratropium (Cathalene Cervantse) 2.5 MG-0.5 MG/3 ML  3 mL Nebulization ONCE Erica Jones MD        albuterol (PROVENTIL HFA, VENTOLIN HFA, PROAIR HFA) inhaler 2 Puff  2 Puff Inhalation Q6H PRN Erica Jones MD   2 Puff at 07/21/21 1257    dexamethasone (DECADRON) 4 mg/mL injection 6 mg  6 mg IntraVENous Q24H Alok Schwartz MD       [facility] Liner ON 7/22/2021] cefepime (MAXIPIME) 2 g in sterile water (preservative free) 10 mL IV syringe  2 g IntraVENous Q8H Marlon Zaidi MD         Current Outpatient Medications   Medication Sig Dispense Refill    dexAMETHasone (Decadron) 6 mg tablet Take 1 Tablet by mouth two (2) times daily (with meals). 7 Tablet 0    levothyroxine (SYNTHROID) 137 mcg tablet Take 137 mcg by mouth every morning. 30 Tablet 0    acetaminophen (TYLENOL) 325 mg tablet Take 2 Tablets by mouth every four (4) hours as needed for Pain. 30 Tablet 0    amLODIPine (NORVASC) 5 mg tablet Take 1 Tablet by mouth daily. 30 Tablet 1    atorvastatin (LIPITOR) 20 mg tablet Take 1 Tablet by mouth nightly. 30 Tablet 1    famotidine (PEPCID) 20 mg tablet Take 1 Tablet by mouth two (2) times a day. 17 Tablet 0    cefpodoxime (VANTIN) 200 mg tablet Take 1 Tablet by mouth two (2) times a day. Indications: infection of the urinary tract from Proteus bacteria 10 Tablet 0    aspirin 81 mg chewable tablet Take 81 mg by mouth daily.  sertraline (ZOLOFT) 25 mg tablet Take 25 mg by mouth daily. (Patient not taking: Reported on 7/11/2021)      calcium-cholecalciferol, d3, (CALCIUM 600 + D) 600-125 mg-unit tab Take  by mouth daily.  cholecalciferol, vitamin D3, (VITAMIN D3) 50 mcg (2,000 unit) tab Take  by mouth every seven (7) days.  cyanocobalamin (VITAMIN B-12) 500 mcg tablet Take 500 mcg by mouth daily.  fluticasone-umeclidinium-vilanterol (TRELEGY ELLIPTA) 100-62.5-25 mcg inhaler Take 1 Puff by inhalation daily.  Pt will stop 48 hrs prior to surgery      mv-min-vit C-Glu-Tawana [id number] ([place], WITH LYSINE ACETATE,) 250-12.5 mg chew Take 2 Tabs by mouth. Clinical Impression     Clinical Impression:   1. Acute respiratory failure with hypoxia (HCC)    2. COVID-19        Disposition: This note was dictated utilizing voice recognition software which may lead to typographical errors. I apologize in advance if the situation occurs. If questions arise please do not hesitate to contact me or call our department.     Rocío James MD  10:43 AM

## 2021-07-22 ENCOUNTER — APPOINTMENT (OUTPATIENT)
Dept: NON INVASIVE DIAGNOSTICS | Age: 61
DRG: 137 | End: 2021-07-22
Attending: FAMILY MEDICINE
Payer: MEDICAID

## 2021-07-22 ENCOUNTER — APPOINTMENT (OUTPATIENT)
Dept: VASCULAR SURGERY | Age: 61
DRG: 137 | End: 2021-07-22
Attending: FAMILY MEDICINE
Payer: MEDICAID

## 2021-07-22 LAB
ANION GAP SERPL CALC-SCNC: 9 MMOL/L (ref 3–18)
ATRIAL RATE: 73 BPM
BUN SERPL-MCNC: 14 MG/DL (ref 7–18)
BUN/CREAT SERPL: 20 (ref 12–20)
CALCIUM SERPL-MCNC: 9 MG/DL (ref 8.5–10.1)
CALCULATED P AXIS, ECG09: 27 DEGREES
CALCULATED R AXIS, ECG10: 15 DEGREES
CALCULATED T AXIS, ECG11: -22 DEGREES
CHLORIDE SERPL-SCNC: 107 MMOL/L (ref 100–111)
CK MB CFR SERPL CALC: 5.2 % (ref 0–4)
CK MB CFR SERPL CALC: 7.1 % (ref 0–4)
CK MB SERPL-MCNC: 1.3 NG/ML (ref 5–25)
CK MB SERPL-MCNC: 2.5 NG/ML (ref 5–25)
CK SERPL-CCNC: 25 U/L (ref 26–192)
CK SERPL-CCNC: 35 U/L (ref 26–192)
CO2 SERPL-SCNC: 23 MMOL/L (ref 21–32)
CREAT SERPL-MCNC: 0.69 MG/DL (ref 0.6–1.3)
CRP SERPL-MCNC: 8.1 MG/DL (ref 0–0.3)
DIAGNOSIS, 93000: NORMAL
ERYTHROCYTE [DISTWIDTH] IN BLOOD BY AUTOMATED COUNT: 13.2 % (ref 11.6–14.5)
GLUCOSE SERPL-MCNC: 191 MG/DL (ref 74–99)
HCT VFR BLD AUTO: 35.5 % (ref 35–45)
HGB BLD-MCNC: 12.2 G/DL (ref 12–16)
MCH RBC QN AUTO: 31.5 PG (ref 24–34)
MCHC RBC AUTO-ENTMCNC: 34.4 G/DL (ref 31–37)
MCV RBC AUTO: 91.7 FL (ref 74–97)
P-R INTERVAL, ECG05: 152 MS
PLATELET # BLD AUTO: 151 K/UL (ref 135–420)
PMV BLD AUTO: 13.4 FL (ref 9.2–11.8)
POTASSIUM SERPL-SCNC: 3 MMOL/L (ref 3.5–5.5)
Q-T INTERVAL, ECG07: 460 MS
QRS DURATION, ECG06: 84 MS
QTC CALCULATION (BEZET), ECG08: 506 MS
RBC # BLD AUTO: 3.87 M/UL (ref 4.2–5.3)
SODIUM SERPL-SCNC: 139 MMOL/L (ref 136–145)
TROPONIN I SERPL-MCNC: <0.02 NG/ML (ref 0–0.04)
TROPONIN I SERPL-MCNC: <0.02 NG/ML (ref 0–0.04)
VENTRICULAR RATE, ECG03: 73 BPM
WBC # BLD AUTO: 4 K/UL (ref 4.6–13.2)

## 2021-07-22 PROCEDURE — XW033E5 INTRODUCTION OF REMDESIVIR ANTI-INFECTIVE INTO PERIPHERAL VEIN, PERCUTANEOUS APPROACH, NEW TECHNOLOGY GROUP 5: ICD-10-PCS | Performed by: FAMILY MEDICINE

## 2021-07-22 PROCEDURE — 2709999900 HC NON-CHARGEABLE SUPPLY

## 2021-07-22 PROCEDURE — 82550 ASSAY OF CK (CPK): CPT

## 2021-07-22 PROCEDURE — 93970 EXTREMITY STUDY: CPT

## 2021-07-22 PROCEDURE — 3E0333Z INTRODUCTION OF ANTI-INFLAMMATORY INTO PERIPHERAL VEIN, PERCUTANEOUS APPROACH: ICD-10-PCS | Performed by: FAMILY MEDICINE

## 2021-07-22 PROCEDURE — 94760 N-INVAS EAR/PLS OXIMETRY 1: CPT

## 2021-07-22 PROCEDURE — 80048 BASIC METABOLIC PNL TOTAL CA: CPT

## 2021-07-22 PROCEDURE — 86140 C-REACTIVE PROTEIN: CPT

## 2021-07-22 PROCEDURE — 74011250636 HC RX REV CODE- 250/636: Performed by: FAMILY MEDICINE

## 2021-07-22 PROCEDURE — 87070 CULTURE OTHR SPECIMN AEROBIC: CPT

## 2021-07-22 PROCEDURE — 65660000000 HC RM CCU STEPDOWN

## 2021-07-22 PROCEDURE — 74011250637 HC RX REV CODE- 250/637: Performed by: FAMILY MEDICINE

## 2021-07-22 PROCEDURE — 94640 AIRWAY INHALATION TREATMENT: CPT

## 2021-07-22 PROCEDURE — 74011000250 HC RX REV CODE- 250: Performed by: FAMILY MEDICINE

## 2021-07-22 PROCEDURE — 36415 COLL VENOUS BLD VENIPUNCTURE: CPT

## 2021-07-22 PROCEDURE — 85027 COMPLETE CBC AUTOMATED: CPT

## 2021-07-22 RX ORDER — HYDRALAZINE HYDROCHLORIDE 25 MG/1
25 TABLET, FILM COATED ORAL
Status: DISCONTINUED | OUTPATIENT
Start: 2021-07-22 | End: 2021-07-25

## 2021-07-22 RX ORDER — POTASSIUM CHLORIDE 20 MEQ/1
40 TABLET, EXTENDED RELEASE ORAL
Status: COMPLETED | OUTPATIENT
Start: 2021-07-22 | End: 2021-07-22

## 2021-07-22 RX ORDER — ACETAMINOPHEN 325 MG/1
650 TABLET ORAL
Status: DISCONTINUED | OUTPATIENT
Start: 2021-07-22 | End: 2021-07-25 | Stop reason: HOSPADM

## 2021-07-22 RX ORDER — BUDESONIDE 0.5 MG/2ML
500 INHALANT ORAL
Status: DISCONTINUED | OUTPATIENT
Start: 2021-07-22 | End: 2021-07-25 | Stop reason: HOSPADM

## 2021-07-22 RX ORDER — IPRATROPIUM BROMIDE AND ALBUTEROL SULFATE 2.5; .5 MG/3ML; MG/3ML
3 SOLUTION RESPIRATORY (INHALATION) 3 TIMES DAILY
Status: DISCONTINUED | OUTPATIENT
Start: 2021-07-22 | End: 2021-07-22

## 2021-07-22 RX ORDER — IPRATROPIUM BROMIDE AND ALBUTEROL SULFATE 2.5; .5 MG/3ML; MG/3ML
3 SOLUTION RESPIRATORY (INHALATION)
Status: DISCONTINUED | OUTPATIENT
Start: 2021-07-22 | End: 2021-07-25 | Stop reason: HOSPADM

## 2021-07-22 RX ADMIN — CEFEPIME HYDROCHLORIDE 2 G: 2 INJECTION, POWDER, FOR SOLUTION INTRAVENOUS at 20:50

## 2021-07-22 RX ADMIN — IPRATROPIUM BROMIDE AND ALBUTEROL SULFATE 3 ML: .5; 3 SOLUTION RESPIRATORY (INHALATION) at 20:50

## 2021-07-22 RX ADMIN — FAMOTIDINE 20 MG: 20 TABLET ORAL at 17:16

## 2021-07-22 RX ADMIN — IPRATROPIUM BROMIDE AND ALBUTEROL SULFATE 3 ML: .5; 3 SOLUTION RESPIRATORY (INHALATION) at 15:33

## 2021-07-22 RX ADMIN — BUDESONIDE 500 MCG: 0.5 SUSPENSION RESPIRATORY (INHALATION) at 20:50

## 2021-07-22 RX ADMIN — ATORVASTATIN CALCIUM 20 MG: 20 TABLET, FILM COATED ORAL at 20:51

## 2021-07-22 RX ADMIN — ENOXAPARIN SODIUM 40 MG: 40 INJECTION SUBCUTANEOUS at 18:34

## 2021-07-22 RX ADMIN — FAMOTIDINE 20 MG: 20 TABLET ORAL at 09:14

## 2021-07-22 RX ADMIN — ASPIRIN 81 MG: 81 TABLET, CHEWABLE ORAL at 09:14

## 2021-07-22 RX ADMIN — LEVOTHYROXINE SODIUM 125 MCG: 125 TABLET ORAL at 06:29

## 2021-07-22 RX ADMIN — ACETAMINOPHEN 650 MG: 325 TABLET ORAL at 16:30

## 2021-07-22 RX ADMIN — DEXAMETHASONE SODIUM PHOSPHATE 6 MG: 4 INJECTION, SOLUTION INTRAMUSCULAR; INTRAVENOUS at 17:16

## 2021-07-22 RX ADMIN — POTASSIUM CHLORIDE 40 MEQ: 1500 TABLET, EXTENDED RELEASE ORAL at 09:14

## 2021-07-22 NOTE — CONSULTS
Infectious Disease Consultation Note        Reason: evaluate for hospital acquired pneumonia, recent covid-19 pneumonia    Current abx Prior abx   Cefepime since 7/21 Vancomycin on 7/21     Lines:       Assessment :    57 y. o.  female who has history of hyperlipidemia COPD anxiety hypothyroidism.  Ovarian cancer 2010 ,hypertension and seasonal allergy presented to ED on 7/21/2021 with generalized weakness, increased shortness of breath       Hospitalization at [facility] 7/11-7/13/21 for acute hypoxia-present on admission due to confirmed COVID-19 pneumonia  Positive COVID-19 PCR on 7/11/2021  CTA chest 7/11-bilateral subpleural patchy and nodular groundglass densities   S/p decadron  7/11-7/18, remdesivir 7/11/21-7/12    Now with increasing shortness of breath, generalized malaise    Clinical presentation consistent with dyspnea likely secondary to recent COVID-19 pneumonia/lung changes, probable partially treated Klebsiella cystitis    Urine culture 7/11-Klebsiella. S/p ceftriaxone 7/11, 7/12. Unable to take outpatient po antibiotics.       Cough with grayish sputum concerning for superimposed bacterial pneumonia. However, low procalcitonin argues against this. No evidence of pulmonary embolism noted on CTA chest 7/21/2021. Afebrile. Subjective sense of improvement noted today. Elevated D-dimer - superficial occlusive thrombus of the cephalic vein left upper extremity-monitor for COVID-19 associated hypercoagulability     Recommendations:     1.  Recommend cefepime  2. Follow-up results of urine culture and modify antibiotics as needed  3. Obtain sputum cultures  4. May start tapering steroids in a.m. if continued clinical improvement noted  5. Titrate oxygen as tolerated      Above plan was discussed in details with patient, dr. Vishal Parham. Please call me if any further questions or concerns. Will continue to participate in the care of this patient.     Thank you for consultation request. HPI:    57 y.o.  female who  history of Covid infection COPD and anxiety hypothyroid hypertension ovarian cancer 2010.     Patient was admitted July 12 after being sick for a week fever congestion runny nose cough headache. Patient received 2 doses of remdesivir and Decadron IV and IV Rocephin with good response patient felt better declined to stay for further treatment declined to wait for the result of urine culture patient was discharged on Vantin 200 mg twice a day. Patient could not get Vantin due to insurance coverage she was switched to Augmentin as outpatient. Unfortunately patient stopped the medication on her own day and  didnt let us know that she had side effect with swelling of the face when she start Augmentin.     Patient presented to the office today seen by Dr. Venkat Khanna for follow-up she was in respiratory distress pulse oximetry 89% room air she was sent to the ER for further evaluation and treatment     Patient in the ER was retested for Covid and she she still remains positive. Patient had repeat CTAof the chest no pulmonary embolus but CT scan showed progression of the diffuse pulmonary process was consistent with infection COVID-19 is a on the differential diagnosis     Patient was given Maxipime 2 g IV and Zithromax 500 mg IV sputum culture and blood cultures pending. Urine analysis still showing sign of infection.     Discussed with ID Dr. Cyndee Curtis we will continue patient on Decadron 6 mg IV daily. Continue Maxipime 2 g IV every 8 hour until urine culture and blood culture.     Initial lab work in the ER white blood cells 11.1 H&H 14.3/40.0 platelets 925     Sodium 137 potassium 2.6 creatinine 0.62 ALT 59 AST 55 respectively  Procalcitonin less than 1.05  C-reactive protein 9  D-dimer 1.23     CXR  Interval development of patchy airspace disease in bilateral mid and lower  lungs. Recommend follow-up study for interval improvement. CTA  Negative for pulmonary emboli.  . There has been significant progression of the  diffuse pulmonary process which is most consistent with infection. Findings are  nonspecific as to organism; Covid 19 is a  differential consideration   EKG  Sinus rhythm with occasional premature ventricular complexes   Cannot rule out Anterior infarct , age undetermined   T wave abnormality, consider inferior ischemia   Abnormal ECG   When compared with ECG of 10-FEB-2020 11:37,   premature ventricular complexes are now present   Non-specific change in ST segment in Anterior leads   T wave inversion now evident in Inferior leads   Inverted T waves have replaced nonspecific T wave abnormality in Anterior   leads   QT has lengthened          Past Medical History:   Diagnosis Date    Cancer ([address]     Ovarian cancer ([address]     Thyroid disease        Past Surgical History:   Procedure Laterality Date    COLONOSCOPY N/A 5/21/2019    COLONOSCOPY, SCREENING with hot snare polypectomy, w/ Bx performed by Tamika Alvarenga [facility]         Current Discharge Medication List      CONTINUE these medications which have NOT CHANGED    Details   levothyroxine (SYNTHROID) 137 mcg tablet Take 137 mcg by mouth every morning. Qty: 30 Tablet, Refills: 0      acetaminophen (TYLENOL) 325 mg tablet Take 2 Tablets by mouth every four (4) hours as needed for Pain. Qty: 30 Tablet, Refills: 0      atorvastatin (LIPITOR) 20 mg tablet Take 1 Tablet by mouth nightly. Qty: 30 Tablet, Refills: 1      famotidine (PEPCID) 20 mg tablet Take 1 Tablet by mouth two (2) times a day. Qty: 17 Tablet, Refills: 0      cefpodoxime (VANTIN) 200 mg tablet Take 1 Tablet by mouth two (2) times a day. Indications: infection of the urinary tract from Proteus bacteria  Qty: 10 Tablet, Refills: 0      aspirin 81 mg chewable tablet Take 81 mg by mouth daily. cholecalciferol, vitamin D3, (VITAMIN D3) 50 mcg (2,000 unit) tab Take  by mouth every seven (7) days.       cyanocobalamin (VITAMIN B-12) 500 mcg tablet Take 500 mcg by mouth daily. fluticasone-umeclidinium-vilanterol (TRELEGY ELLIPTA) 100-62.5-25 mcg inhaler Take 1 Puff by inhalation daily. Pt will stop 48 hrs prior to surgery      mv-min-vit C-Glu-Tawana [id number] (AIRBORNE, WITH LYSINE ACETATE,) 250-12.5 mg chew Take 2 Tabs by mouth.              Current Facility-Administered Medications   Medication Dose Route Frequency    budesonide (PULMICORT) 500 mcg/2 ml nebulizer suspension  500 mcg Nebulization BID RT    hydrALAZINE (APRESOLINE) tablet 25 mg  25 mg Oral TID PRN    albuterol-ipratropium (DUO-NEB) 2.5 MG-0.5 MG/3 ML  3 mL Nebulization TID RT    albuterol (PROVENTIL HFA, VENTOLIN HFA, PROAIR HFA) inhaler 2 Puff  2 Puff Inhalation Q6H PRN    dexamethasone (DECADRON) 4 mg/mL injection 6 mg  6 mg IntraVENous Q24H    cefepime (MAXIPIME) 2 g in sterile water (preservative free) 10 mL IV syringe  2 g IntraVENous Q8H    aspirin chewable tablet 81 mg  81 mg Oral DAILY    levothyroxine (SYNTHROID) tablet 125 mcg  125 mcg Oral 6am    famotidine (PEPCID) tablet 20 mg  20 mg Oral BID    enoxaparin (LOVENOX) injection 40 mg  40 mg SubCUTAneous Q24H    atorvastatin (LIPITOR) tablet 20 mg  20 mg Oral QHS       Allergies: Latex, Morphine, Adhesive, Augmentin [amoxicillin-pot clavulanate], and Seafood    Family History   Problem Relation Age of Onset    Thyroid Disease Mother     Breast Cancer Mother     Cancer Brother 24        brain    Cancer Maternal Aunt         lung    Breast Problems Maternal Aunt 29    Breast Cancer Maternal Aunt     Cancer Paternal Aunt     Cancer Paternal Uncle     Cancer Maternal Grandmother     Breast Cancer Maternal Grandmother     Cancer Maternal Grandfather     Cancer Other         child leukemia    Heart Attack Other      Social History     Socioeconomic History    Marital status:      Spouse name: Not on file    Number of children: Not on file    Years of education: Not on file  Highest education level: Not on file   Occupational History    Not on file   Tobacco Use    Smoking status: Current Every Day Smoker    Smokeless tobacco: Never Used    Tobacco comment: smokes one cigarette day   Substance and Sexual Activity    Alcohol use: No    Drug use: No    Sexual activity: Never   Other Topics Concern    Not on file   Social History Narrative    Not on file     Social Determinants of Health     Financial Resource Strain:     Difficulty of Paying Living Expenses:    Food Insecurity:     Worried About Running Out of Food in the Last Year:     Ran Out of Food in the Last Year:    Transportation Needs:     Lack of Transportation (Medical):  Lack of Transportation (Non-Medical):    Physical Activity:     Days of Exercise per Week:     Minutes of Exercise per Session:    Stress:     Feeling of Stress :    Social Connections:     Frequency of Communication with Friends and Family:     Frequency of Social Gatherings with Friends and Family:     Attends Oriental orthodox Services:     Active Member of Clubs or Organizations:     Attends Club or Organization Meetings:     Marital Status:    Intimate Partner Violence:     Fear of Current or Ex-Partner:     Emotionally Abused:     Physically Abused:     Sexually Abused:      Social History     Tobacco Use   Smoking Status Current Every Day Smoker   Smokeless Tobacco Never Used   Tobacco Comment    smokes one cigarette day        Temp (24hrs), Av.6 °F (36.4 °C), Min:97.3 °F (36.3 °C), Max:98 °F (36.7 °C)    Visit Vitals  /82   Pulse (!) 59   Temp 97.5 °F (36.4 °C)   Resp 19   Ht 5' 3\" (1.6 m)   Wt 93 kg (205 lb)   SpO2 94%   Breastfeeding No   BMI 36.31 kg/m²       ROS: 12 point ROS obtained in details. Pertinent positives as mentioned in HPI,   otherwise negative    Physical Exam:    General: Well developed, well nourished female laying on the bed/sitting on the  bed AAOx3 in no acute distress.     General:   awake alert and oriented   HEENT:  Normocephalic, atraumatic, PERRL, EOMI, no scleral icterus or pallor; no conjunctival hemmohage;  nasal and oral mucous are moist and without evidence of lesions. No thrush. Dentition good. Neck supple, no bruits. Lymph Nodes:   no cervical, axillary or inguinal adenopathy   Lungs:   non-labored, bilaterally clear to auscultation- no crackles wheezes rales or rhonchi   Heart:  RRR, s1 and s2; no murmurs rubs or gallops, no edema, + pedal pulses   Abdomen:  soft, non-distended, active bowel sounds, no hepatomegaly, no splenomegaly. Appropriate surgical scars for stated surgeries. Non-tender   Genitourinary:  deferred   Extremities:   no clubbing, cyanosis; no joint effusions or swelling; Full ROM of all large joints to the upper and lower extremities; muscle mass appropriate for age   Neurologic:  No gross focal sensory abnormalities; 5/5 muscle strength to upper and lower extremities. Speech appropriate.  Cranial nerves intact                        Skin:  No rash or ulcers noted   Wound:       Back:  no spinal or paraspinal muscle tenderness or rigidity, no CVA tenderness     Psychiatric:  No suicidal or homicidal ideations, appropriate mood and affect         Labs: Results:   Chemistry Recent Labs     07/22/21  0125 07/21/21  1028   * 108*    137   K 3.0* 2.6*    105   CO2 23 26   BUN 14 11   CREA 0.69 0.62   CA 9.0 8.8   AGAP 9 6   BUCR 20 18   AP  --  92   TP  --  7.0   ALB  --  2.8*   GLOB  --  4.2*   AGRAT  --  0.7*      CBC w/Diff Recent Labs     07/22/21  0125 07/21/21  1028   WBC 4.0* 11.1   RBC 3.87* 4.45   HGB 12.2 14.3   HCT 35.5 40.0    142   GRANS  --  68   LYMPH  --  15*   EOS  --  1      Microbiology Recent Labs     07/21/21  1305 07/21/21  1058   CULT NO GROWTH AFTER 17 HOURS NO GROWTH AFTER 19 HOURS          RADIOLOGY:    All available imaging studies/reports in [facility] for this admission were reviewed      Disclaimer: Sections of this note are dictated utilizing voice recognition software, which may have resulted in some phonetic based errors in grammar and contents. Even though attempts were made to correct all the mistakes, some may have been missed, and remained in the body of the document. If questions arise, please contact our department.     Dr. Mary Grijalva, Infectious Disease Specialist  [phone]  July 22, 2021  10:23 AM

## 2021-07-22 NOTE — H&P
History & Physical    Patient: Dayan Peter MRN: [id number]  CSN: [id number]    YOB: 1960  Age: 57 y.o. Sex: female      DOA: 7/21/2021    Chief Complaint:   Chief Complaint   Patient presents with    Shortness of Breath          HPI:     Dayan Velasco Comment is a 57 y.o.  female who  history of Covid infection COPD and anxiety hypothyroid hypertension ovarian cancer 2010. Patient was admitted July 12 after being sick for a week fever congestion runny nose cough headache. Patient received 2 doses of remdesivir and Decadron IV and IV Rocephin with good response patient felt better declined to stay for further treatment declined to wait for the result of urine culture patient was discharged on Vantin 200 mg twice a day. Patient could not get Vantin due to insurance coverage she was switched to Augmentin as outpatient. Unfortunately patient stopped the medication on her own day and  didnt let us know that she had side effect with swelling of the face when she start Augmentin. Patient presented to the office today seen by Dr. Robbin Soria for follow-up she was in respiratory distress pulse oximetry 89% room air she was sent to the ER for further evaluation and treatment    Patient in the ER was retested for Covid and she she still remains positive. Patient had repeat CTAof the chest no pulmonary embolus but CT scan showed progression of the diffuse pulmonary process was consistent with infection COVID-19 is a on the differential diagnosis    Patient was given Maxipime 2 g IV and Zithromax 500 mg IV sputum culture and blood cultures pending. Urine analysis still showing sign of infection. Discussed with ID Dr. Mary Seay we will continue patient on Decadron 6 mg IV daily. Continue Maxipime 2 g IV every 8 hour until urine culture and blood culture.     Initial lab work in the ER white blood cells 11.1 H&H 14.3/40.0 platelets 584    Sodium 137 potassium 2.6 creatinine 0.62 ALT 59 AST 55 respectively  Procalcitonin less than 1.05  C-reactive protein 9  D-dimer 1.23    CXR  Interval development of patchy airspace disease in bilateral mid and lower  lungs. Recommend follow-up study for interval improvement. CTA  Negative for pulmonary emboli. . There has been significant progression of the  diffuse pulmonary process which is most consistent with infection. Findings are  nonspecific as to organism;  Covid 19 is a  differential consideration   EKG  Sinus rhythm with occasional premature ventricular complexes   Cannot rule out Anterior infarct , age undetermined   T wave abnormality, consider inferior ischemia   Abnormal ECG   When compared with ECG of 10-FEB-2020 11:37,   premature ventricular complexes are now present   Non-specific change in ST segment in Anterior leads   T wave inversion now evident in Inferior leads   Inverted T waves have replaced nonspecific T wave abnormality in Anterior   leads   QT has lengthened     Past Medical History:   Diagnosis Date    Cancer ([facility]     Ovarian [facility] ([facility]     Thyroid disease        Past Surgical History:   Procedure Laterality Date    COLONOSCOPY N/A 5/21/2019    COLONOSCOPY, SCREENING with hot snare polypectomy, w/ Bx performed by Aissatou Andino MD at [address] [place] [address] HX HYSTERECTOMY         Family History   Problem Relation Age of Onset    Thyroid Disease Mother     Breast Cancer Mother     Cancer Brother 24        brain    Cancer Maternal Aunt         lung    Breast Problems Maternal Aunt 29    Breast Cancer Maternal Aunt     Cancer Paternal Aunt     Cancer Paternal Uncle     Cancer Maternal Grandmother     Breast Cancer Maternal Grandmother     Cancer Maternal Grandfather     Cancer Other         child leukemia    Heart Attack Other        Social History     Socioeconomic History    Marital status:      Spouse name: Not on file    Number of children: Not on file    Years of education: Not on file    Highest education level: Not on file   Tobacco Use    Smoking status: Current Every Day Smoker    Smokeless tobacco: Never Used    Tobacco comment: smokes one cigarette day   Substance and Sexual Activity    Alcohol use: No    Drug use: No    Sexual activity: Never     Social Determinants of Health     Financial Resource Strain:     Difficulty of Paying Living Expenses:    Food Insecurity:     Worried About Running Out of Food in the Last Year:     Ran Out of Food in the Last Year:    Transportation Needs:     Lack of Transportation (Medical):  Lack of Transportation (Non-Medical):    Physical Activity:     Days of Exercise per Week:     Minutes of Exercise per Session:    Stress:     Feeling of Stress :    Social Connections:     Frequency of Communication with Friends and Family:     Frequency of Social Gatherings with Friends and Family:     Attends Pentecostal Services:     Active Member of Clubs or Organizations:     Attends Club or Organization Meetings:     Marital Status:        Prior to Admission medications    Medication Sig Start Date End Date Taking? Authorizing Provider   levothyroxine (SYNTHROID) 137 mcg tablet Take 137 mcg by mouth every morning. 7/14/21   Val Burton MD   acetaminophen (TYLENOL) 325 mg tablet Take 2 Tablets by mouth every four (4) hours as needed for Pain. 7/13/21   Val Burton MD   atorvastatin (LIPITOR) 20 mg tablet Take 1 Tablet by mouth nightly. 7/13/21   Val Burton MD   famotidine (PEPCID) 20 mg tablet Take 1 Tablet by mouth two (2) times a day. 7/13/21   Val Burton MD   cefpodoxime Clois J Carlos) 200 mg tablet Take 1 Tablet by mouth two (2) times a day. Indications: infection of the urinary tract from Proteus bacteria 7/14/21   Angela Candelaria MD   aspirin 81 mg chewable tablet Take 81 mg by mouth daily. Laurent Clay MD   cholecalciferol, vitamin D3, (VITAMIN D3) 50 mcg (2,000 unit) tab Take  by mouth every seven (7) days.     Provider, Historical   cyanocobalamin (VITAMIN B-12) 500 mcg tablet Take 500 mcg by mouth daily. Provider, Historical   fluticasone-umeclidinium-vilanterol (TRELEGY ELLIPTA) 100-62.5-25 mcg inhaler Take 1 Puff by inhalation daily. Pt will stop 48 hrs prior to surgery    Provider, Historical   mv-min-vit C-Glu-Tawana LG- (AIRBORNE, WITH LYSINE ACETATE,) 250-12.5 mg chew Take 2 Tabs by mouth. Provider, Historical       Allergies   Allergen Reactions    Latex Hives    Morphine Anaphylaxis    Adhesive Hives    Augmentin [Amoxicillin-Pot Clavulanate] Angioedema     Facial swelling    Seafood Hives and Nausea Only     Shellfish       Review of Systems  GENERAL: Patient alert, awake and oriented times 3, able to communicate full sentences and not in distress. On o2 by NC 2L/m  HEENT: No change in vision, no earache, tinnitus, sore throat or sinus congestion. Sx improving  NECK: No pain or stiffness. CARDIOVASCULAR: No chest pain or pressure. No palpitations. PULMONARY: positive shortness of breath, positive  cough and  wheeze. GASTROINTESTINAL: No abdominal pain, nausea, vomiting or diarrhea, melena or       bright red blood per rectum. GENITOURINARY: No urinary frequency, urgency, hesitancy or dysuria. MUSCULOSKELETAL: No joint or muscle pain, no back pain, no recent trauma. DERMATOLOGIC: No rash, no itching, no lesions. ENDOCRINE: No polyuria, polydipsia, no heat or cold intolerance. No recent change in    weight. HEMATOLOGICAL: No anemia or easy bruising or bleeding. NEUROLOGIC: No headache, seizures generalized weakness  PSYCHIATRIC: No depression, anxiety, mood disorder, no loss of interest in normal       activity or change in sleep pattern.         Physical Exam:     Physical Exam:  Visit Vitals  /82   Pulse (!) 59   Temp 97.5 °F (36.4 °C)   Resp 19   Ht 5' 3\" (1.6 m)   Wt 93 kg (205 lb)   SpO2 94%   Breastfeeding No   BMI 36.31 kg/m²    O2 Flow Rate (L/min): 2 l/min O2 Device: None (Room air)    Temp (24hrs), Av.7 °F (36.5 °C), Min:97.3 °F (36.3 °C), Max:98.1 °F (36.7 °C)    No intake/output data recorded. 1901 -  0700  In: 240 [P.O.:240]  Out: 400 [Urine:400]    General:  Alert, cooperative, no distress, appears stated age. Head:  Normocephalic, without obvious abnormality, atraumatic. Eyes:  Conjunctivae/corneas clear. PERRL, EOMs intact. Nose: Nares normal. No drainage or sinus tenderness. Throat: Lips, mucosa, and tongue dry   Neck: Supple, symmetrical, trachea midline, no adenopathy, thyroid: no enlargement/tenderness/nodules, no carotid bruit and no JVD. Back:   ROM normal. No CVA tenderness. Lungs:   Fine basal crepitation    Chest wall:  No tenderness or deformity. Heart:  Regular rate and rhythm, S1, S2 normal, no murmur, click, rub or gallop. Abdomen: Soft, non-tender. Bowel sounds normal. No masses,  No organomegaly. Extremities: Extremities normal, atraumatic, no cyanosis or edema. Pulses: 2+ and symmetric all extremities. Skin: Skin color, texture, turgor normal. No rashes or lesions   Neurologic: CNII-XII intact. No focal motor or sensory deficit. Labs Reviewed: All lab results for the last 24 hours reviewed.   CT, CXR and EKG    Procedures/imaging: see electronic medical records for all procedures/Xrays and details which were not copied into this note but were reviewed prior to creation of Plan        Assessment/Plan     Active Problems:    GERD (gastroesophageal reflux disease) (2/10/2014)      Hypothyroid (2/10/2014)      Respiratory failure with hypoxia ([address] (2021)      COVID-19 (2021)         Plan:  Acue respiratory failure/COPD/COVID-19 positive test with Acute Pneumonia / post covid syndrome  Patient has respiratory distress O2 sat dropped to 89 on admission  Patient on Trelegy at home we will switch to duo nebulizer 3 times daily  Pulmicort via nebulizer twice daily  Follow-up respiratory culture  Blood culture  Repeat cardiac enzyme, echo  Follow-up ID consult  Maxipime 2 g IV every 8 hour  Decadron 6 mg IV daily  Check venous duplex ultrasound B/L joo and leg  echo    UTI  Patient did not finish course of treatment Augmentin due to lack of insurance coverage she could not tolerate Augmentin stop medication on her own  Check urine culture     Hypothyroid  Patient on Synthroid 137 mcg daily at home  Will check tsh and free t4      Vitamin D deficiency  Check vitamin D level- pending  Vitamin D3 2000 units daily     Hypertension  Hold Norvasc 5 mg daily  Continue to monitor blood pressure  Hydralazine 25 mg TID [rn SBP above 170      Cbc, cmp, mag in AM  CRP , d dimer daily, procalcitonin daily    DVT/GI Prophylaxis: Lovenox, SCD's and H2B/PPI    Part of this note was dictated use Dragon medical software. Please excuse errors that have escaped final proofreading.     Time for admission more than 65 minutes included review previous record,hospital record ,test result previous discharge summery , ,discussion with patient and exam. Discussion with nursing staff and documentation  Pankaj Heart MD  7/22/2021  10:13 AM

## 2021-07-22 NOTE — ROUTINE PROCESS
0732-/Bedside and Verbal shift change report given to Gena (oncoming nurse) by Lloyd Reinoso (offgoing nurse). Report included the following information SBAR, Kardex and Recent Results. 0934-shift assessment complete. Morning meds given. 1933-/Bedside and Verbal shift change report given to Lloyd Reinoso (oncoming nurse) by Otto (offgoing nurse). Report included the following information SBAR, Kardex and Recent Results.

## 2021-07-22 NOTE — ROUTINE PROCESS
Bedside and Verbal shift change report given to DERIC Avery (oncoming nurse) by Batool Schmitz RN   (offgoing nurse). Report included the following information SBAR, Kardex, Intake/Output, MAR and Recent Results.

## 2021-07-22 NOTE — PROGRESS NOTES
Reason for Admission:  Respiratory failure with hypoxia ([facility] [address] [J96.91]  COVID-19 [U07.1]                 RUR Score:    10%            Plan for utilizing home health:    No, pt is not home bound                      Likelihood of Readmission:   LOW                         Transition of Care Plan:              Initial assessment completed with patient. Cognitive status of patient: oriented to time, place, person and situation. Face sheet information confirmed:  yes. The patient designates [facility] to participate in her discharge plan and to receive any needed information. This patient lives in a single family home with patient and mother. Patient is able to navigate steps as needed. Prior to hospitalization, patient was considered to be independent with ADLs/IADLS : yes . Pt. stated she works full time at [employer] as a  and is not home bound. Patient has a current ACP document on file: no      Healthcare Decision Maker:     Click here to complete [address] including selection of the Healthcare Decision Maker Relationship (ie \"Primary\")    The patient and mother will be available to transport patient home upon discharge. The patient already has none reported, and  medical equipment available in the home. Patient is not currently active with home health. Patient has not stayed in a skilled nursing facility or rehab. This patient is on dialysis :no    IFreedom of choice signed: no. Currently, the discharge plan is Home. The patient states that she can obtain her medications from the pharmacy, and take her medications as directed. Patient's current insurance is [facility]       Care Management Interventions  PCP Verified by CM:  Yes  Mode of Transport at Discharge: Self  Transition of Care Consult (CM Consult): Discharge Planning  Discharge Durable Medical Equipment: No  Physical Therapy Consult: No  Occupational Therapy Consult: No  Current Support Network: Lives with Caregiver (lives with mom)  Confirm Follow Up Transport: Family  Discharge Location  Discharge Placement: [address] [facility], [place], [facility] [phone]

## 2021-07-22 NOTE — PROGRESS NOTES
Problem: Risk for Spread of Infection  Goal: Prevent transmission of infectious organism to others  Description: Prevent the transmission of infectious organisms to other patients, staff members, and visitors. Outcome: Progressing Towards Goal     Problem: Patient Education:  Go to Education Activity  Goal: Patient/Family Education  Outcome: Progressing Towards Goal     Problem: Falls - Risk of  Goal: *Absence of Falls  Description: Document Carole Adames Fall Risk and appropriate interventions in the flowsheet. Outcome: Progressing Towards Goal  Note: Fall Risk Interventions:                      History of Falls Interventions: Consult care management for discharge planning, Evaluate medications/consider consulting pharmacy         Problem: Patient Education: Go to Patient Education Activity  Goal: Patient/Family Education  Outcome: Progressing Towards Goal     Problem: Airway Clearance - Ineffective  Goal: Achieve or maintain patent airway  Outcome: Progressing Towards Goal     Problem: Gas Exchange - Impaired  Goal: Absence of hypoxia  Outcome: Progressing Towards Goal  Goal: Promote optimal lung function  Outcome: Progressing Towards Goal     Problem: Breathing Pattern - Ineffective  Goal: Ability to achieve and maintain a regular respiratory rate  Outcome: Progressing Towards Goal     Problem:  Body Temperature -  Risk of, Imbalanced  Goal: Ability to maintain a body temperature within defined limits  Outcome: Progressing Towards Goal  Goal: Will regain or maintain usual level of consciousness  Outcome: Progressing Towards Goal  Goal: Complications related to the disease process, condition or treatment will be avoided or minimized  Outcome: Progressing Towards Goal     Problem: Isolation Precautions - Risk of Spread of Infection  Goal: Prevent transmission of infectious organism to others  Outcome: Progressing Towards Goal     Problem: Nutrition Deficits  Goal: Optimize nutrtional status  Outcome: Progressing Towards Goal     Problem: Risk for Fluid Volume Deficit  Goal: Maintain normal heart rhythm  Outcome: Progressing Towards Goal  Goal: Maintain absence of muscle cramping  Outcome: Progressing Towards Goal  Goal: Maintain normal serum potassium, sodium, calcium, phosphorus, and pH  Outcome: Progressing Towards Goal     Problem: Loneliness or Risk for Loneliness  Goal: Demonstrate positive use of time alone when socialization is not possible  Outcome: Progressing Towards Goal     Problem: Fatigue  Goal: Verbalize increase energy and improved vitality  Outcome: Progressing Towards Goal     Problem: Patient Education: Go to Patient Education Activity  Goal: Patient/Family Education  Outcome: Progressing Towards Goal

## 2021-07-23 ENCOUNTER — APPOINTMENT (OUTPATIENT)
Dept: NON INVASIVE DIAGNOSTICS | Age: 61
DRG: 137 | End: 2021-07-23
Attending: FAMILY MEDICINE
Payer: MEDICAID

## 2021-07-23 LAB
ALBUMIN SERPL-MCNC: 2.6 G/DL (ref 3.4–5)
ALBUMIN/GLOB SERPL: 0.7 {RATIO} (ref 0.8–1.7)
ALP SERPL-CCNC: 91 U/L (ref 45–117)
ALT SERPL-CCNC: 43 U/L (ref 13–56)
ANION GAP SERPL CALC-SCNC: 8 MMOL/L (ref 3–18)
AST SERPL-CCNC: 26 U/L (ref 10–38)
BACTERIA SPEC CULT: NORMAL
BASOPHILS # BLD: 0 K/UL (ref 0–0.1)
BASOPHILS NFR BLD: 0 % (ref 0–2)
BILIRUB SERPL-MCNC: 0.8 MG/DL (ref 0.2–1)
BUN SERPL-MCNC: 15 MG/DL (ref 7–18)
BUN/CREAT SERPL: 22 (ref 12–20)
CALCIUM SERPL-MCNC: 8.7 MG/DL (ref 8.5–10.1)
CHLORIDE SERPL-SCNC: 108 MMOL/L (ref 100–111)
CO2 SERPL-SCNC: 25 MMOL/L (ref 21–32)
CREAT SERPL-MCNC: 0.69 MG/DL (ref 0.6–1.3)
CRP SERPL-MCNC: 2.7 MG/DL (ref 0–0.3)
D DIMER PPP FEU-MCNC: 0.6 UG/ML(FEU)
DIFFERENTIAL METHOD BLD: ABNORMAL
EOSINOPHIL # BLD: 0 K/UL (ref 0–0.4)
EOSINOPHIL NFR BLD: 0 % (ref 0–5)
ERYTHROCYTE [DISTWIDTH] IN BLOOD BY AUTOMATED COUNT: 13.3 % (ref 11.6–14.5)
EST. AVERAGE GLUCOSE BLD GHB EST-MCNC: 134 MG/DL
GLOBULIN SER CALC-MCNC: 3.5 G/DL (ref 2–4)
GLUCOSE BLD STRIP.AUTO-MCNC: 167 MG/DL (ref 70–110)
GLUCOSE BLD STRIP.AUTO-MCNC: 205 MG/DL (ref 70–110)
GLUCOSE BLD STRIP.AUTO-MCNC: 220 MG/DL (ref 70–110)
GLUCOSE SERPL-MCNC: 214 MG/DL (ref 74–99)
HBA1C MFR BLD: 6.3 % (ref 4.2–5.6)
HCT VFR BLD AUTO: 34.3 % (ref 35–45)
HGB BLD-MCNC: 11.7 G/DL (ref 12–16)
LYMPHOCYTES # BLD: 0.9 K/UL (ref 0.9–3.6)
LYMPHOCYTES NFR BLD: 8 % (ref 21–52)
MAGNESIUM SERPL-MCNC: 2.1 MG/DL (ref 1.6–2.6)
MCH RBC QN AUTO: 31.6 PG (ref 24–34)
MCHC RBC AUTO-ENTMCNC: 34.1 G/DL (ref 31–37)
MCV RBC AUTO: 92.7 FL (ref 74–97)
MONOCYTES # BLD: 1 K/UL (ref 0.05–1.2)
MONOCYTES NFR BLD: 9 % (ref 3–10)
NEUTS SEG # BLD: 9.2 K/UL (ref 1.8–8)
NEUTS SEG NFR BLD: 82 % (ref 40–73)
PLATELET # BLD AUTO: 164 K/UL (ref 135–420)
PMV BLD AUTO: 13 FL (ref 9.2–11.8)
POTASSIUM SERPL-SCNC: 3 MMOL/L (ref 3.5–5.5)
PROCALCITONIN SERPL-MCNC: <0.05 NG/ML
PROT SERPL-MCNC: 6.1 G/DL (ref 6.4–8.2)
RBC # BLD AUTO: 3.7 M/UL (ref 4.2–5.3)
SERVICE CMNT-IMP: NORMAL
SODIUM SERPL-SCNC: 141 MMOL/L (ref 136–145)
T4 FREE SERPL-MCNC: 1.1 NG/DL (ref 0.7–1.5)
TSH SERPL DL<=0.05 MIU/L-ACNC: 1.42 UIU/ML (ref 0.36–3.74)
WBC # BLD AUTO: 11.3 K/UL (ref 4.6–13.2)

## 2021-07-23 PROCEDURE — 65660000000 HC RM CCU STEPDOWN

## 2021-07-23 PROCEDURE — 2709999900 HC NON-CHARGEABLE SUPPLY

## 2021-07-23 PROCEDURE — 84145 PROCALCITONIN (PCT): CPT

## 2021-07-23 PROCEDURE — 94761 N-INVAS EAR/PLS OXIMETRY MLT: CPT

## 2021-07-23 PROCEDURE — 74011000250 HC RX REV CODE- 250: Performed by: FAMILY MEDICINE

## 2021-07-23 PROCEDURE — 85025 COMPLETE CBC W/AUTO DIFF WBC: CPT

## 2021-07-23 PROCEDURE — 80053 COMPREHEN METABOLIC PANEL: CPT

## 2021-07-23 PROCEDURE — 74011636637 HC RX REV CODE- 636/637: Performed by: FAMILY MEDICINE

## 2021-07-23 PROCEDURE — 86140 C-REACTIVE PROTEIN: CPT

## 2021-07-23 PROCEDURE — 87040 BLOOD CULTURE FOR BACTERIA: CPT

## 2021-07-23 PROCEDURE — 74011250637 HC RX REV CODE- 250/637: Performed by: FAMILY MEDICINE

## 2021-07-23 PROCEDURE — 74011250636 HC RX REV CODE- 250/636: Performed by: FAMILY MEDICINE

## 2021-07-23 PROCEDURE — 74011250636 HC RX REV CODE- 250/636: Performed by: INTERNAL MEDICINE

## 2021-07-23 PROCEDURE — 94760 N-INVAS EAR/PLS OXIMETRY 1: CPT

## 2021-07-23 PROCEDURE — 74011250637 HC RX REV CODE- 250/637: Performed by: INTERNAL MEDICINE

## 2021-07-23 PROCEDURE — 84439 ASSAY OF FREE THYROXINE: CPT

## 2021-07-23 PROCEDURE — 77030027138 HC INCENT SPIROMETER -A

## 2021-07-23 PROCEDURE — 83735 ASSAY OF MAGNESIUM: CPT

## 2021-07-23 PROCEDURE — 82962 GLUCOSE BLOOD TEST: CPT

## 2021-07-23 PROCEDURE — 36415 COLL VENOUS BLD VENIPUNCTURE: CPT

## 2021-07-23 PROCEDURE — 85379 FIBRIN DEGRADATION QUANT: CPT

## 2021-07-23 PROCEDURE — 94640 AIRWAY INHALATION TREATMENT: CPT

## 2021-07-23 PROCEDURE — 84443 ASSAY THYROID STIM HORMONE: CPT

## 2021-07-23 PROCEDURE — 77010033678 HC OXYGEN DAILY

## 2021-07-23 PROCEDURE — 83036 HEMOGLOBIN GLYCOSYLATED A1C: CPT

## 2021-07-23 RX ORDER — MAGNESIUM SULFATE 100 %
4 CRYSTALS MISCELLANEOUS AS NEEDED
Status: DISCONTINUED | OUTPATIENT
Start: 2021-07-23 | End: 2021-07-25 | Stop reason: HOSPADM

## 2021-07-23 RX ORDER — POTASSIUM CHLORIDE 20 MEQ/1
40 TABLET, EXTENDED RELEASE ORAL
Status: COMPLETED | OUTPATIENT
Start: 2021-07-23 | End: 2021-07-23

## 2021-07-23 RX ORDER — INSULIN LISPRO 100 [IU]/ML
INJECTION, SOLUTION INTRAVENOUS; SUBCUTANEOUS
Status: DISCONTINUED | OUTPATIENT
Start: 2021-07-23 | End: 2021-07-25 | Stop reason: HOSPADM

## 2021-07-23 RX ORDER — POTASSIUM CHLORIDE 20 MEQ/1
40 TABLET, EXTENDED RELEASE ORAL ONCE
Status: COMPLETED | OUTPATIENT
Start: 2021-07-23 | End: 2021-07-23

## 2021-07-23 RX ORDER — LEVOFLOXACIN 750 MG/1
750 TABLET ORAL EVERY 24 HOURS
Status: DISCONTINUED | OUTPATIENT
Start: 2021-07-23 | End: 2021-07-25 | Stop reason: HOSPADM

## 2021-07-23 RX ORDER — DEXTROSE 50 % IN WATER (D50W) INTRAVENOUS SYRINGE
25-50 AS NEEDED
Status: DISCONTINUED | OUTPATIENT
Start: 2021-07-23 | End: 2021-07-25 | Stop reason: HOSPADM

## 2021-07-23 RX ADMIN — LEVOTHYROXINE SODIUM 125 MCG: 125 TABLET ORAL at 05:36

## 2021-07-23 RX ADMIN — DEXAMETHASONE 6 MG: 4 TABLET ORAL at 16:51

## 2021-07-23 RX ADMIN — FAMOTIDINE 20 MG: 20 TABLET ORAL at 09:12

## 2021-07-23 RX ADMIN — IPRATROPIUM BROMIDE AND ALBUTEROL SULFATE 3 ML: .5; 3 SOLUTION RESPIRATORY (INHALATION) at 20:20

## 2021-07-23 RX ADMIN — INSULIN LISPRO 4 UNITS: 100 INJECTION, SOLUTION INTRAVENOUS; SUBCUTANEOUS at 11:30

## 2021-07-23 RX ADMIN — IPRATROPIUM BROMIDE AND ALBUTEROL SULFATE 3 ML: .5; 3 SOLUTION RESPIRATORY (INHALATION) at 13:22

## 2021-07-23 RX ADMIN — CEFEPIME HYDROCHLORIDE 2 G: 2 INJECTION, POWDER, FOR SOLUTION INTRAVENOUS at 05:01

## 2021-07-23 RX ADMIN — ENOXAPARIN SODIUM 40 MG: 40 INJECTION SUBCUTANEOUS at 18:24

## 2021-07-23 RX ADMIN — LEVOFLOXACIN 750 MG: 750 TABLET, FILM COATED ORAL at 09:16

## 2021-07-23 RX ADMIN — ASPIRIN 81 MG: 81 TABLET, CHEWABLE ORAL at 09:12

## 2021-07-23 RX ADMIN — INSULIN LISPRO 2 UNITS: 100 INJECTION, SOLUTION INTRAVENOUS; SUBCUTANEOUS at 18:23

## 2021-07-23 RX ADMIN — POTASSIUM CHLORIDE 40 MEQ: 1500 TABLET, EXTENDED RELEASE ORAL at 11:19

## 2021-07-23 RX ADMIN — FAMOTIDINE 20 MG: 20 TABLET ORAL at 18:24

## 2021-07-23 RX ADMIN — BUDESONIDE 500 MCG: 0.5 SUSPENSION RESPIRATORY (INHALATION) at 20:20

## 2021-07-23 RX ADMIN — INSULIN LISPRO 4 UNITS: 100 INJECTION, SOLUTION INTRAVENOUS; SUBCUTANEOUS at 21:43

## 2021-07-23 RX ADMIN — ATORVASTATIN CALCIUM 20 MG: 20 TABLET, FILM COATED ORAL at 21:44

## 2021-07-23 RX ADMIN — POTASSIUM CHLORIDE 40 MEQ: 1500 TABLET, EXTENDED RELEASE ORAL at 09:13

## 2021-07-23 NOTE — PROGRESS NOTES
1520 Pt quiet and withdrawn, \"I'm angry\", upset she is not going home today. Visit from [address] [place] [address] Road was offered but declined. After a few minutes, patient began talking about things she enjoys and is expecting a visit from her mother since she is no longer on isolation. 80 Pt now smiling and feeling better after visit from her mother. Cup of coffee made and given to her at her request. DERIC Avery at  to medicate patient.

## 2021-07-23 NOTE — PROGRESS NOTES
Infectious Disease progress Note        Reason: evaluate for hospital acquired pneumonia, recent covid-19 pneumonia    Current abx Prior abx   Cefepime since 7/21 Vancomycin on 7/21     Lines:       Assessment :    57 y. o.  female who has history of hyperlipidemia COPD anxiety hypothyroidism.  Ovarian cancer 2010 ,hypertension and seasonal allergy presented to ED on 7/21/2021 with generalized weakness, increased shortness of breath       Hospitalization at [facility] 7/11-7/13/21 for acute hypoxia-present on admission due to confirmed COVID-19 pneumonia  Positive COVID-19 PCR on 7/11/2021  CTA chest 7/11-bilateral subpleural patchy and nodular groundglass densities   S/p decadron  7/11-7/18, remdesivir 7/11/21-7/12    Now with increasing shortness of breath, generalized malaise    Clinical presentation consistent with dyspnea likely secondary to recent COVID-19 pneumonia/lung changes, probable partially treated Klebsiella cystitis    Urine culture 7/11-Klebsiella. S/p ceftriaxone 7/11, 7/12. Unable to take outpatient po antibiotics. Urine culture 7/21-no significant growth. Discussed with microbiology lab      Cough with grayish sputum concerning for superimposed bacterial pneumonia. However, low procalcitonin argues against this. Sputum cx 7/22- pending    No evidence of pulmonary embolism noted on CTA chest 7/21/2021. Afebrile. Subjective sense of improvement noted today. Elevated D-dimer - superficial occlusive thrombus of the cephalic vein left upper extremity-monitor for COVID-19 associated hypercoagulability    Clinically better. Oxygen saturation 98% on room air. Wishes to go home     Recommendations:     1.  D/c cefepime. Start levofloxacin for 4 more days  2. May start tapering steroids -give about 5 days of steroid taper  3. Discharge planning per primary team  4. Discontinue droplet plus isolation since duration of symptoms is 18 days and patient has had resolution of respiratory symptoms. Addendum: 13:50 pm  Was informed by RN that patient's blood culture 7/21 now positive for gram negative rods. Source of gram-negative bacteremia not entirely clear at this time-this could represent bloodstream infection secondary to partially treated Klebsiella cystitis versus contamination. Will need to follow-up identification of the bacteria to determine this. We will also need to repeat blood cultures to determine clearance of bacteremia. Recommend to hold off on discharge planning and significance of gram-negative bloodstream infection clear & and final decisions about duration and type of antibiotics can be made      Above plan was discussed in details with patient, dr. Patsy Pereyra. Please call me if any further questions or concerns. Will continue to participate in the care of this patient. HPI:    Feels better. Significant improvement in breathing. Wishes to go home. Current Discharge Medication List      CONTINUE these medications which have NOT CHANGED    Details   levothyroxine (SYNTHROID) 137 mcg tablet Take 137 mcg by mouth every morning. Qty: 30 Tablet, Refills: 0      acetaminophen (TYLENOL) 325 mg tablet Take 2 Tablets by mouth every four (4) hours as needed for Pain. Qty: 30 Tablet, Refills: 0      atorvastatin (LIPITOR) 20 mg tablet Take 1 Tablet by mouth nightly. Qty: 30 Tablet, Refills: 1      famotidine (PEPCID) 20 mg tablet Take 1 Tablet by mouth two (2) times a day. Qty: 17 Tablet, Refills: 0      cefpodoxime (VANTIN) 200 mg tablet Take 1 Tablet by mouth two (2) times a day. Indications: infection of the urinary tract from Proteus bacteria  Qty: 10 Tablet, Refills: 0      aspirin 81 mg chewable tablet Take 81 mg by mouth daily. cholecalciferol, vitamin D3, (VITAMIN D3) 50 mcg (2,000 unit) tab Take  by mouth every seven (7) days. cyanocobalamin (VITAMIN B-12) 500 mcg tablet Take 500 mcg by mouth daily.       fluticasone-umeclidinium-vilanterol (Miriam Verdin ELLIPTA) 100-62.5-25 mcg inhaler Take 1 Puff by inhalation daily. Pt will stop 48 hrs prior to surgery      mv-min-vit C-Glu-Tawana [id number] (AIR[place]ORNE, WITH LYSINE ACETATE,) 250-12.5 mg chew Take 2 Tabs by mouth.              Current Facility-Administered Medications   Medication Dose Route Frequency    potassium chloride (K-DUR, KLOR-CON) SR tablet 40 mEq  40 mEq Oral NOW    potassium chloride (K-DUR, KLOR-CON) SR tablet 40 mEq  40 mEq Oral ONCE    insulin lispro (HUMALOG) injection   SubCUTAneous AC&HS    glucose chewable tablet 16 g  4 Tablet Oral PRN    glucagon (GLUCAGEN) injection 1 mg  1 mg IntraMUSCular PRN    dextrose (D50W) injection syrg 12.5-25 g  25-50 mL IntraVENous PRN    budesonide (PULMICORT) 500 mcg/2 ml nebulizer suspension  500 mcg Nebulization BID RT    hydrALAZINE (APRESOLINE) tablet 25 mg  25 mg Oral TID PRN    albuterol-ipratropium (DUO-NEB) 2.5 MG-0.5 MG/3 ML  3 mL Nebulization TID RT    acetaminophen (TYLENOL) tablet 650 mg  650 mg Oral Q6H PRN    albuterol (PROVENTIL HFA, VENTOLIN HFA, PROAIR HFA) inhaler 2 Puff  2 Puff Inhalation Q6H PRN    dexamethasone (DECADRON) 4 mg/mL injection 6 mg  6 mg IntraVENous Q24H    cefepime (MAXIPIME) 2 g in sterile water (preservative free) 10 mL IV syringe  2 g IntraVENous Q8H    aspirin chewable tablet 81 mg  81 mg Oral DAILY    levothyroxine (SYNTHROID) tablet 125 mcg  125 mcg Oral 6am    famotidine (PEPCID) tablet 20 mg  20 mg Oral BID    enoxaparin (LOVENOX) injection 40 mg  40 mg SubCUTAneous Q24H    atorvastatin (LIPITOR) tablet 20 mg  20 mg Oral QHS       Allergies: Latex, Morphine, Adhesive, Augmentin [amoxicillin-pot clavulanate], and Seafood    Temp (24hrs), Av.6 °F (36.4 °C), Min:97.5 °F (36.4 °C), Max:97.6 °F (36.4 °C)    Visit Vitals  /69 (BP 1 Location: Right arm, BP Patient Position: At rest)   Pulse (!) 59   Temp 97.6 °F (36.4 °C)   Resp 18   Ht 5' 3\" (1.6 m)   Wt 93 kg (205 lb)   SpO2 96%   Breastfeeding No   BMI 36.31 kg/m²       ROS: 12 point ROS obtained in details. Pertinent positives as mentioned in HPI,   otherwise negative    Physical Exam:    General: Well developed, well nourished female laying on the bed/sitting on the  bed AAOx3 in no acute distress. General:   awake alert and oriented   HEENT:  Normocephalic, atraumatic, PERRL, EOMI, no scleral icterus or pallor; no conjunctival hemmohage;  nasal and oral mucous are moist and without evidence of lesions. No thrush. Dentition good. Neck supple, no bruits. Lymph Nodes:   no cervical, axillary or inguinal adenopathy   Lungs:   non-labored, bilaterally clear to auscultation- no crackles wheezes rales or rhonchi   Heart:  RRR, s1 and s2; no murmurs rubs or gallops, no edema, + pedal pulses   Abdomen:  soft, non-distended, active bowel sounds, no hepatomegaly, no splenomegaly. Appropriate surgical scars for stated surgeries. Non-tender   Genitourinary:  deferred   Extremities:   no clubbing, cyanosis; no joint effusions or swelling; Full ROM of all large joints to the upper and lower extremities; muscle mass appropriate for age   Neurologic:  No gross focal sensory abnormalities; 5/5 muscle strength to upper and lower extremities. Speech appropriate.  Cranial nerves intact                        Skin:  No rash or ulcers noted   Wound:       Back:  no spinal or paraspinal muscle tenderness or rigidity, no CVA tenderness     Psychiatric:  No suicidal or homicidal ideations, appropriate mood and affect         Labs: Results:   Chemistry Recent Labs     07/23/21  0203 07/22/21  0125 07/21/21  1028   * 191* 108*    139 137   K 3.0* 3.0* 2.6*    107 105   CO2 25 23 26   BUN 15 14 11   CREA 0.69 0.69 0.62   CA 8.7 9.0 8.8   AGAP 8 9 6   BUCR 22* 20 18   AP 91  --  92   TP 6.1*  --  7.0   ALB 2.6*  --  2.8*   GLOB 3.5  --  4.2*   AGRAT 0.7*  --  0.7*      CBC w/Diff Recent Labs     07/23/21  0203 07/22/21  0125 07/21/21  1028   WBC 11.3 4.0* 11.1   RBC 3.70* 3.87* 4.45   HGB 11.7* 12.2 14.3   HCT 34.3* 35.5 40.0    151 142   GRANS 82*  --  68   LYMPH 8*  --  15*   EOS 0  --  1      Microbiology Recent Labs     07/22/21  1542 07/21/21  1305 07/21/21  1058   CULT PENDING NO GROWTH 2 DAYS NO GROWTH 2 DAYS          RADIOLOGY:    All available imaging studies/reports in [facility] for this admission were reviewed  High complexity decision making was performed during the evaluation of this patient at high risk for decompensation          Disclaimer: Sections of this note are dictated utilizing voice recognition software, which may have resulted in some phonetic based errors in grammar and contents. Even though attempts were made to correct all the mistakes, some may have been missed, and remained in the body of the document. If questions arise, please contact our department.     Dr. Berto Rodas, Infectious Disease Specialist  [phone]  July 23, 2021  10:23 AM

## 2021-07-23 NOTE — PROGRESS NOTES
Patient states she is being discharged today, per her doctor, but no orders or notes found on a discharge. Breathing treatments do open the patient up, but she has considerable amount of crackles in the bases of the lungs, bilaterally.

## 2021-07-23 NOTE — ROUTINE PROCESS
Bedside and Verbal shift change report given to DERIC Staley and Nate Hines RN (oncoming nurse) by Hailey Belle RN   (offgoing nurse). Report included the following information SBAR, Kardex, Intake/Output, MAR and Recent Results.

## 2021-07-23 NOTE — ROUTINE PROCESS
0700-/Bedside and Verbal shift change report given to Otto (oncoming nurse) by Gabriela Em (offgoing nurse). Report included the following information SBAR, MAR and Recent Results. 1950-VERBA shift change report given to Madison Gottron (oncoming nurse) by Otto (offgoing nurse).  Report included the following information SBAR, MAR.

## 2021-07-23 NOTE — PROGRESS NOTES
Problem: Risk for Spread of Infection  Goal: Prevent transmission of infectious organism to others  Description: Prevent the transmission of infectious organisms to other patients, staff members, and visitors. Outcome: Progressing Towards Goal     Problem: Patient Education:  Go to Education Activity  Goal: Patient/Family Education  Outcome: Progressing Towards Goal     Problem: Falls - Risk of  Goal: *Absence of Falls  Description: Document Carole Adames Fall Risk and appropriate interventions in the flowsheet. Outcome: Progressing Towards Goal  Note: Fall Risk Interventions:                      History of Falls Interventions: Consult care management for discharge planning, Door open when patient unattended, Evaluate medications/consider consulting pharmacy         Problem: Patient Education: Go to Patient Education Activity  Goal: Patient/Family Education  Outcome: Progressing Towards Goal     Problem: Airway Clearance - Ineffective  Goal: Achieve or maintain patent airway  Outcome: Progressing Towards Goal     Problem: Gas Exchange - Impaired  Goal: Absence of hypoxia  Outcome: Progressing Towards Goal  Goal: Promote optimal lung function  Outcome: Progressing Towards Goal     Problem: Breathing Pattern - Ineffective  Goal: Ability to achieve and maintain a regular respiratory rate  Outcome: Progressing Towards Goal     Problem:  Body Temperature -  Risk of, Imbalanced  Goal: Ability to maintain a body temperature within defined limits  Outcome: Progressing Towards Goal  Goal: Will regain or maintain usual level of consciousness  Outcome: Progressing Towards Goal  Goal: Complications related to the disease process, condition or treatment will be avoided or minimized  Outcome: Progressing Towards Goal     Problem: Isolation Precautions - Risk of Spread of Infection  Goal: Prevent transmission of infectious organism to others  Outcome: Progressing Towards Goal     Problem: Nutrition Deficits  Goal: Optimize nutrtional status  Outcome: Progressing Towards Goal     Problem: Risk for Fluid Volume Deficit  Goal: Maintain normal heart rhythm  Outcome: Progressing Towards Goal  Goal: Maintain absence of muscle cramping  Outcome: Progressing Towards Goal  Goal: Maintain normal serum potassium, sodium, calcium, phosphorus, and pH  Outcome: Progressing Towards Goal     Problem: Loneliness or Risk for Loneliness  Goal: Demonstrate positive use of time alone when socialization is not possible  Outcome: Progressing Towards Goal     Problem: Fatigue  Goal: Verbalize increase energy and improved vitality  Outcome: Progressing Towards Goal     Problem: Patient Education: Go to Patient Education Activity  Goal: Patient/Family Education  Outcome: Progressing Towards Goal     Problem: Hypertension  Goal: *Blood pressure within specified parameters  Outcome: Progressing Towards Goal  Goal: *Fluid volume balance  Outcome: Progressing Towards Goal  Goal: *Labs within defined limits  Outcome: Progressing Towards Goal     Problem: Patient Education: Go to Patient Education Activity  Goal: Patient/Family Education  Outcome: Progressing Towards Goal

## 2021-07-23 NOTE — PROGRESS NOTES
Progress Note    Patient: Dayan Peter MRN: [id number]  CSN: [id number]    YOB: 1960  Age: 57 y.o. Sex: female    DOA: 7/21/2021 LOS:  LOS: 2 days                    Subjective:   Pt is feeling better no chest pain SOB is improving    Venous duplex ultrasound  · No evidence of deep vein thrombosis in the right upper extremity. Right upper extremity veins were visualized in the transverse and longitudinal views. The vessels showed normal color filling and compressibility. Doppler interrogation showed phasic and spontaneous flow. Superficial occlusive thrombosis of the cephalic vein, within the left upper extremity. No DVT demonstrated bilateral upper extremity. · No evidence of acute deep vein thrombosis in the right common femoral, superficial femoral, popliteal, posterior tibial, and peroneal veins. The veins were imaged in the transverse and longitudinal planes. The vessels showed normal color filling and compressibility. Doppler interrogation showed phasic and spontaneous flow. · No evidence of acute deep vein thrombosis in the left common femoral, superficial femoral, popliteal, posterior tibial, and peroneal veins. The veins were imaged in the transverse and longitudinal planes. The vessels showed normal color filling and compressibility. Doppler interrogation showed phasic and spontaneous flow. Chief Complaint:   Chief Complaint   Patient presents with    Shortness of Breath       Review of systems  General: No fevers or chills. pt off o2 by NC  Cardiovascular: No chest pain or pressure. No palpitations. Pulmonary:  shortness of breath improved , cough or wheeze. Gastrointestinal: No abdominal pain, nausea, vomiting or diarrhea. Genitourinary: No urinary frequency, urgency, hesitancy or dysuria. Musculoskeletal: No joint or muscle pain, no back pain, no recent trauma.     Neurologic: No headache, generalized weakness    Objective:     Physical Exam:  Visit Vitals  BP 124/69 (BP 1 Location: Right arm, BP Patient Position: At rest)   Pulse (!) 59   Temp 97.6 °F (36.4 °C)   Resp 18   Ht 5' 3\" (1.6 m)   Wt 93 kg (205 lb)   SpO2 96%   Breastfeeding No   BMI 36.31 kg/m²        General:         Alert, cooperative, no acute distress    HEENT: NC, Atraumatic. PERRLA, anicteric sclerae. Lungs: CTA Bilaterally. No Wheezing/Rhonchi/Rales. Heart:  Regular  rhythm,  No murmur, No Rubs, No Gallops  Abdomen: Soft, Non distended, Non tender.    Extremities: No lower limb edema   Psych:    Not anxious or agitated. Neurologic:  CN 2-12 grossly intact, Alert and oriented X 3. No acute neurological                                 Deficits,     Intake and Output:  Current Shift:  07/22 1901 - 07/23 0700  In: 120 [P.O.:120]  Out: 100 [Urine:100]  Last three shifts:  07/21 0701 - 07/22 1900  In: 240 [P.O.:240]  Out: 400 [Urine:400]    Labs: Results:       Chemistry Recent Labs     07/23/21  0203 07/22/21  0125 07/21/21  1028   * 191* 108*    139 137   K 3.0* 3.0* 2.6*    107 105   CO2 25 23 26   BUN 15 14 11   CREA 0.69 0.69 0.62   CA 8.7 9.0 8.8   AGAP 8 9 6   BUCR 22* 20 18   AP 91  --  92   TP 6.1*  --  7.0   ALB 2.6*  --  2.8*   GLOB 3.5  --  4.2*   AGRAT 0.7*  --  0.7*      CBC w/Diff Recent Labs     07/23/21  0203 07/22/21  0125 07/21/21  1028   WBC 11.3 4.0* 11.1   RBC 3.70* 3.87* 4.45   HGB 11.7* 12.2 14.3   HCT 34.3* 35.5 40.0    151 142   GRANS 82*  --  68   LYMPH 8*  --  15*   EOS 0  --  1      Cardiac Enzymes Recent Labs     07/22/21  1410 07/22/21  0125   CPK 35 25*   CKND1 7.1* 5.2*      Coagulation No results for input(s): PTP, INR, APTT, INREXT in the last 72 hours.     Lipid Panel Lab Results   Component Value Date/Time    Cholesterol, total 104 07/12/2021 04:47 AM    HDL Cholesterol 36 (L) 07/12/2021 04:47 AM    LDL, calculated 45 07/12/2021 04:47 AM    VLDL, calculated 23 07/12/2021 04:47 AM    Triglyceride 115 07/12/2021 04:47 AM    CHOL/HDL Ratio 2.9 07/12/2021 04:47 AM      BNP No results for input(s): BNPP in the last 72 hours.    Liver Enzymes Recent Labs     07/23/21  0203   TP 6.1*   ALB 2.6*   AP 91      Thyroid Studies Lab Results   Component Value Date/Time    T4, Total 5.2 04/10/2012 05:41 PM    TSH 1.42 07/23/2021 02:03 AM          Procedures/imaging: see electronic medical records for all procedures/Xrays and details which were not copied into this note but were reviewed prior to creation of Plan    Medications:   Current Facility-Administered Medications   Medication Dose Route Frequency    budesonide (PULMICORT) 500 mcg/2 ml nebulizer suspension  500 mcg Nebulization BID RT    hydrALAZINE (APRESOLINE) tablet 25 mg  25 mg Oral TID PRN    albuterol-ipratropium (DUO-NEB) 2.5 MG-0.5 MG/3 ML  3 mL Nebulization TID RT    acetaminophen (TYLENOL) tablet 650 mg  650 mg Oral Q6H PRN    albuterol (PROVENTIL HFA, VENTOLIN HFA, PROAIR HFA) inhaler 2 Puff  2 Puff Inhalation Q6H PRN    dexamethasone (DECADRON) 4 mg/mL injection 6 mg  6 mg IntraVENous Q24H    cefepime (MAXIPIME) 2 g in sterile water (preservative free) 10 mL IV syringe  2 g IntraVENous Q8H    aspirin chewable tablet 81 mg  81 mg Oral DAILY    levothyroxine (SYNTHROID) tablet 125 mcg  125 mcg Oral 6am    famotidine (PEPCID) tablet 20 mg  20 mg Oral BID    enoxaparin (LOVENOX) injection 40 mg  40 mg SubCUTAneous Q24H    atorvastatin (LIPITOR) tablet 20 mg  20 mg Oral QHS       Assessment/Plan     Active Problems:    GERD (gastroesophageal reflux disease) (2/10/2014)      Hypothyroid (2/10/2014)      Respiratory failure with hypoxia (HCC) (7/11/2021)      COVID-19 (7/11/2021)      Acue respiratory failure/COPD/COVID-19 positive test with Acute Pneumonia / post covid syndrome  Patient has respiratory distress O2 sat dropped to 89 on admission, pt off oxygen today no sign of distress   She marsha need to check pulse oximetry with activities   Patient on Trelegy at home we will switch to Atrovent nebulizer and pulmicort  Pulmicort via nebulizer twice daily  Follow-up respiratory culture  Blood culture  Repeat cardiac enzyme negative , echo pending  Follow-up ID consult will discuss with Dr Valenzuela Marrow 2 g IV every 8 hour  Decadron 6 mg IV daily  Glucose is going higher h/o impaired fasting glucose   Will start Humalog sliding scale    Check venous duplex ultrasound B/L joo and leg positive for Lt sup cephalic vain     Hypokalemia  Will give oral potassium 40 manuel x 1   Then another 40 manuel in 2 hours   Continue to monitor potassium level     UTI  Patient did not finish course of treatment Augmentin due to lack of insurance coverage she could not tolerate Augmentin stop medication on her own  Check urine culture pending     Hypothyroid  Patient on Synthroid 137 mcg daily at home  Will check tsh and free t4      Vitamin D deficiency  Check vitamin D level- pending  Vitamin D3 2000 units daily     Hypertension  Hold Norvasc 5 mg daily  Continue to monitor blood pressure  Hydralazine 25 mg TID [rn SBP above 170      Cbc, cmp, mag in AM  Monitor glucose level with sliding scale  Recheck potassium level  Pt and ot evaluation and treatment   Will need check pulse oximetry with activity before discharge     CRP , D dimer daily, procalcitonin daily     DVT/GI Prophylaxis: Lovenox, SCD's and H2B/PPI     Harper Carter MD  7/23/2021 6:48 AM

## 2021-07-23 NOTE — PROGRESS NOTES
I spoke to  regarding the patient's isolation status.  instructed me to remove the patient's droplet isolation status since \"it has been longer than 2 weeks\" and the patient is no longer considered contagious.     Removed isolation order per Doctor's request.

## 2021-07-23 NOTE — PROGRESS NOTES
Problem: Risk for Spread of Infection  Goal: Prevent transmission of infectious organism to others  Description: Prevent the transmission of infectious organisms to other patients, staff members, and visitors. Outcome: Progressing Towards Goal     Problem: Patient Education:  Go to Education Activity  Goal: Patient/Family Education  Outcome: Progressing Towards Goal     Problem: Falls - Risk of  Goal: *Absence of Falls  Description: Document Letha aSntiago Fall Risk and appropriate interventions in the flowsheet. Outcome: Progressing Towards Goal  Note: Fall Risk Interventions:                      History of Falls Interventions: Consult care management for discharge planning, Door open when patient unattended, Evaluate medications/consider consulting pharmacy         Problem: Patient Education: Go to Patient Education Activity  Goal: Patient/Family Education  Outcome: Progressing Towards Goal     Problem: Airway Clearance - Ineffective  Goal: Achieve or maintain patent airway  Outcome: Progressing Towards Goal     Problem: Gas Exchange - Impaired  Goal: Absence of hypoxia  Outcome: Progressing Towards Goal  Goal: Promote optimal lung function  Outcome: Progressing Towards Goal     Problem: Breathing Pattern - Ineffective  Goal: Ability to achieve and maintain a regular respiratory rate  Outcome: Progressing Towards Goal     Problem:  Body Temperature -  Risk of, Imbalanced  Goal: Ability to maintain a body temperature within defined limits  Outcome: Progressing Towards Goal  Goal: Will regain or maintain usual level of consciousness  Outcome: Progressing Towards Goal  Goal: Complications related to the disease process, condition or treatment will be avoided or minimized  Outcome: Progressing Towards Goal     Problem: Isolation Precautions - Risk of Spread of Infection  Goal: Prevent transmission of infectious organism to others  Outcome: Progressing Towards Goal     Problem: Nutrition Deficits  Goal: Optimize nutrtional status  Outcome: Progressing Towards Goal     Problem: Risk for Fluid Volume Deficit  Goal: Maintain normal heart rhythm  Outcome: Progressing Towards Goal  Goal: Maintain absence of muscle cramping  Outcome: Progressing Towards Goal  Goal: Maintain normal serum potassium, sodium, calcium, phosphorus, and pH  Outcome: Progressing Towards Goal     Problem: Loneliness or Risk for Loneliness  Goal: Demonstrate positive use of time alone when socialization is not possible  Outcome: Progressing Towards Goal     Problem: Fatigue  Goal: Verbalize increase energy and improved vitality  Outcome: Progressing Towards Goal     Problem: Patient Education: Go to Patient Education Activity  Goal: Patient/Family Education  Outcome: Progressing Towards Goal

## 2021-07-23 NOTE — PROGRESS NOTES
Physician Progress Note      PATIENTRockey Filter  CSN #:                  [id number]  :                       1960  ADMIT DATE:       2021 10:13 AM  DISCH DATE:  RESPONDING  PROVIDER #:        Avelina Quintero MD          QUERY TEXT:    Patient admitted with c/o increasing SOB. Noted documentation of acute respiratory failure in the 21 H&P. In order to support the diagnosis of acute respiratory failure, please include additional clinical indicators in your documentation. Or please document if the diagnosis of acute respiratory failure has been ruled out after further study. The medical record reflects the following:  Risk Factors: 57 yr old with hx COPD, Covid +;  c/o increasing SOB, continued Covid + on retest  Clinical Indicators: Per 21 H&P: ROS: General:  Patient alert, awake and oriented times 3, able to communicate full sentences and not in distress. On o2 by NC 2L/m. Plan: Acute respiratory failure/COPD/COVID-19 positive test with Acute Pneumonia / post covid syndrome  - Per 21 ED Provider: Patient saturating at the 87-88% on room air, tachypneic. Placed on 2 L of oxygen. Patient given albuterol treatment*3, methylprednisolone.   Feels better on reassessment but still requires oxygen  Treatment: O2 @ 2L/min NC; nebs;  methylprednisolone    Thank you for your time,  Roxanna Robles, [facility]  [phone]      Acute Respiratory Failure Clinical Indicators per  MS-DRG Training Guide and Quick Reference Guide:  pO2 < 60 mmHg or SpO2 (pulse oximetry) < 91% breathing room air  pCO2 > 50 and pH < 7.35  P/F ratio (pO2 / FIO2) < 300  pO2 decrease or pCO2 increase by 10 mmHg from baseline (if known)  Supplemental oxygen of 40% or more  Presence of respiratory distress, tachypnea, dyspnea, shortness of breath, wheezing  Unable to speak in complete sentences  Use of accessory muscles to breathe  Extreme anxiety and feeling of impending doom  Tripod position  Confusion/altered mental status/obtunded  Options provided:  -- Acute Respiratory Failure as evidenced by, Please document evidence. -- Acute Respiratory Failure ruled out after study  -- Other - I will add my own diagnosis  -- Disagree - Not applicable / Not valid  -- Disagree - Clinically unable to determine / Unknown  -- Refer to Clinical Documentation Reviewer    PROVIDER RESPONSE TEXT:    This patient is in acute respiratory failure as evidenced by respiratory distress , hypoxemia , covid positive    Query created by:  Parish Mcgee on 7/22/2021 12:28 PM      Electronically signed by:  Johanna Recio MD 7/23/2021 6:52 PM

## 2021-07-24 ENCOUNTER — APPOINTMENT (OUTPATIENT)
Dept: NON INVASIVE DIAGNOSTICS | Age: 61
DRG: 137 | End: 2021-07-24
Attending: FAMILY MEDICINE
Payer: MEDICAID

## 2021-07-24 LAB
ALBUMIN SERPL-MCNC: 2.6 G/DL (ref 3.4–5)
ALBUMIN/GLOB SERPL: 0.8 {RATIO} (ref 0.8–1.7)
ALP SERPL-CCNC: 83 U/L (ref 45–117)
ALT SERPL-CCNC: 37 U/L (ref 13–56)
ANION GAP SERPL CALC-SCNC: 4 MMOL/L (ref 3–18)
AST SERPL-CCNC: 18 U/L (ref 10–38)
BASOPHILS # BLD: 0 K/UL (ref 0–0.1)
BASOPHILS NFR BLD: 0 % (ref 0–2)
BILIRUB SERPL-MCNC: 1.1 MG/DL (ref 0.2–1)
BUN SERPL-MCNC: 10 MG/DL (ref 7–18)
BUN/CREAT SERPL: 21 (ref 12–20)
CALCIUM SERPL-MCNC: 8.5 MG/DL (ref 8.5–10.1)
CHLORIDE SERPL-SCNC: 112 MMOL/L (ref 100–111)
CO2 SERPL-SCNC: 26 MMOL/L (ref 21–32)
CREAT SERPL-MCNC: 0.48 MG/DL (ref 0.6–1.3)
DIFFERENTIAL METHOD BLD: ABNORMAL
EOSINOPHIL # BLD: 0 K/UL (ref 0–0.4)
EOSINOPHIL NFR BLD: 0 % (ref 0–5)
ERYTHROCYTE [DISTWIDTH] IN BLOOD BY AUTOMATED COUNT: 13.4 % (ref 11.6–14.5)
GLOBULIN SER CALC-MCNC: 3.4 G/DL (ref 2–4)
GLUCOSE BLD STRIP.AUTO-MCNC: 137 MG/DL (ref 70–110)
GLUCOSE BLD STRIP.AUTO-MCNC: 153 MG/DL (ref 70–110)
GLUCOSE BLD STRIP.AUTO-MCNC: 210 MG/DL (ref 70–110)
GLUCOSE BLD STRIP.AUTO-MCNC: 234 MG/DL (ref 70–110)
GLUCOSE SERPL-MCNC: 152 MG/DL (ref 74–99)
HCT VFR BLD AUTO: 35 % (ref 35–45)
HGB BLD-MCNC: 11.6 G/DL (ref 12–16)
LYMPHOCYTES # BLD: 0.9 K/UL (ref 0.9–3.6)
LYMPHOCYTES NFR BLD: 10 % (ref 21–52)
MAGNESIUM SERPL-MCNC: 2.2 MG/DL (ref 1.6–2.6)
MCH RBC QN AUTO: 31.2 PG (ref 24–34)
MCHC RBC AUTO-ENTMCNC: 33.1 G/DL (ref 31–37)
MCV RBC AUTO: 94.1 FL (ref 74–97)
MONOCYTES # BLD: 0.6 K/UL (ref 0.05–1.2)
MONOCYTES NFR BLD: 7 % (ref 3–10)
NEUTS SEG # BLD: 7.4 K/UL (ref 1.8–8)
NEUTS SEG NFR BLD: 80 % (ref 40–73)
PLATELET # BLD AUTO: 179 K/UL (ref 135–420)
PMV BLD AUTO: 12.8 FL (ref 9.2–11.8)
POTASSIUM SERPL-SCNC: 4.2 MMOL/L (ref 3.5–5.5)
PROT SERPL-MCNC: 6 G/DL (ref 6.4–8.2)
RBC # BLD AUTO: 3.72 M/UL (ref 4.2–5.3)
SODIUM SERPL-SCNC: 142 MMOL/L (ref 136–145)
WBC # BLD AUTO: 9.2 K/UL (ref 4.6–13.2)

## 2021-07-24 PROCEDURE — 74011250637 HC RX REV CODE- 250/637: Performed by: FAMILY MEDICINE

## 2021-07-24 PROCEDURE — 83735 ASSAY OF MAGNESIUM: CPT

## 2021-07-24 PROCEDURE — 82962 GLUCOSE BLOOD TEST: CPT

## 2021-07-24 PROCEDURE — 74011636637 HC RX REV CODE- 636/637: Performed by: FAMILY MEDICINE

## 2021-07-24 PROCEDURE — 65660000000 HC RM CCU STEPDOWN

## 2021-07-24 PROCEDURE — 74011250636 HC RX REV CODE- 250/636: Performed by: FAMILY MEDICINE

## 2021-07-24 PROCEDURE — 94640 AIRWAY INHALATION TREATMENT: CPT

## 2021-07-24 PROCEDURE — 94761 N-INVAS EAR/PLS OXIMETRY MLT: CPT

## 2021-07-24 PROCEDURE — 74011000250 HC RX REV CODE- 250: Performed by: FAMILY MEDICINE

## 2021-07-24 PROCEDURE — 80053 COMPREHEN METABOLIC PANEL: CPT

## 2021-07-24 PROCEDURE — 85025 COMPLETE CBC W/AUTO DIFF WBC: CPT

## 2021-07-24 PROCEDURE — 74011250637 HC RX REV CODE- 250/637: Performed by: INTERNAL MEDICINE

## 2021-07-24 PROCEDURE — 74011250636 HC RX REV CODE- 250/636: Performed by: INTERNAL MEDICINE

## 2021-07-24 PROCEDURE — 36415 COLL VENOUS BLD VENIPUNCTURE: CPT

## 2021-07-24 RX ORDER — DEXAMETHASONE 4 MG/1
4 TABLET ORAL DAILY
Status: DISCONTINUED | OUTPATIENT
Start: 2021-07-25 | End: 2021-07-25 | Stop reason: HOSPADM

## 2021-07-24 RX ORDER — METFORMIN HYDROCHLORIDE 500 MG/1
500 TABLET ORAL 2 TIMES DAILY WITH MEALS
Status: DISCONTINUED | OUTPATIENT
Start: 2021-07-24 | End: 2021-07-25 | Stop reason: HOSPADM

## 2021-07-24 RX ORDER — AMLODIPINE BESYLATE 5 MG/1
5 TABLET ORAL DAILY
Status: DISCONTINUED | OUTPATIENT
Start: 2021-07-25 | End: 2021-07-25 | Stop reason: HOSPADM

## 2021-07-24 RX ADMIN — ASPIRIN 81 MG: 81 TABLET, CHEWABLE ORAL at 08:45

## 2021-07-24 RX ADMIN — IPRATROPIUM BROMIDE AND ALBUTEROL SULFATE 3 ML: .5; 3 SOLUTION RESPIRATORY (INHALATION) at 20:56

## 2021-07-24 RX ADMIN — INSULIN LISPRO 4 UNITS: 100 INJECTION, SOLUTION INTRAVENOUS; SUBCUTANEOUS at 12:22

## 2021-07-24 RX ADMIN — LEVOFLOXACIN 750 MG: 750 TABLET, FILM COATED ORAL at 08:46

## 2021-07-24 RX ADMIN — INSULIN LISPRO 2 UNITS: 100 INJECTION, SOLUTION INTRAVENOUS; SUBCUTANEOUS at 22:18

## 2021-07-24 RX ADMIN — FAMOTIDINE 20 MG: 20 TABLET ORAL at 08:45

## 2021-07-24 RX ADMIN — DEXAMETHASONE 6 MG: 4 TABLET ORAL at 08:45

## 2021-07-24 RX ADMIN — BUDESONIDE 500 MCG: 0.5 SUSPENSION RESPIRATORY (INHALATION) at 20:56

## 2021-07-24 RX ADMIN — INSULIN LISPRO 4 UNITS: 100 INJECTION, SOLUTION INTRAVENOUS; SUBCUTANEOUS at 17:38

## 2021-07-24 RX ADMIN — LEVOTHYROXINE SODIUM 125 MCG: 125 TABLET ORAL at 06:47

## 2021-07-24 RX ADMIN — ENOXAPARIN SODIUM 40 MG: 40 INJECTION SUBCUTANEOUS at 19:26

## 2021-07-24 RX ADMIN — METFORMIN HYDROCHLORIDE 500 MG: 500 TABLET ORAL at 17:37

## 2021-07-24 RX ADMIN — BUDESONIDE 500 MCG: 0.5 SUSPENSION RESPIRATORY (INHALATION) at 08:32

## 2021-07-24 RX ADMIN — ACETAMINOPHEN 650 MG: 325 TABLET ORAL at 12:22

## 2021-07-24 RX ADMIN — ATORVASTATIN CALCIUM 20 MG: 20 TABLET, FILM COATED ORAL at 22:06

## 2021-07-24 RX ADMIN — IPRATROPIUM BROMIDE AND ALBUTEROL SULFATE 3 ML: .5; 3 SOLUTION RESPIRATORY (INHALATION) at 14:57

## 2021-07-24 RX ADMIN — IPRATROPIUM BROMIDE AND ALBUTEROL SULFATE 3 ML: .5; 3 SOLUTION RESPIRATORY (INHALATION) at 08:32

## 2021-07-24 RX ADMIN — FAMOTIDINE 20 MG: 20 TABLET ORAL at 17:38

## 2021-07-24 NOTE — PROGRESS NOTES
Progress Note    Patient: Dayan Peter MRN: [id number]  CSN: [id number]    YOB: 1960  Age: 57 y.o. Sex: female    DOA: 7/21/2021 LOS:  LOS: 3 days                    Subjective:     Pt is feeling better no chest pain SOB is improving  Pt had positive blood culture  With klebsiella started on  Levaquin. Pending repeat blood culture CRP  is down to 2.7  D dimer down to 0.6  Bp running high   Glucose running high on steroid will start tapering dose of steroids   Pt has h/o diabetes used to take metformin will restart med with Humalog sliding scale     Venous duplex ultrasound  · No evidence of deep vein thrombosis in the right upper extremity. Right upper extremity veins were visualized in the transverse and longitudinal views. The vessels showed normal color filling and compressibility. Doppler interrogation showed phasic and spontaneous flow. Superficial occlusive thrombosis of the cephalic vein, within the left upper extremity. No DVT demonstrated bilateral upper extremity. · No evidence of acute deep vein thrombosis in the right common femoral, superficial femoral, popliteal, posterior tibial, and peroneal veins. The veins were imaged in the transverse and longitudinal planes. The vessels showed normal color filling and compressibility. Doppler interrogation showed phasic and spontaneous flow. · No evidence of acute deep vein thrombosis in the left common femoral, superficial femoral, popliteal, posterior tibial, and peroneal veins. The veins were imaged in the transverse and longitudinal planes. The vessels showed normal color filling and compressibility. Doppler interrogation showed phasic and spontaneous flow. Chief Complaint:   Chief Complaint   Patient presents with    Shortness of Breath       Review of systems  General: No fevers or chills. pt off o2 by NC  Cardiovascular: No chest pain or pressure. No palpitations.    Pulmonary:  shortness of breath improved , cough or wheeze. Gastrointestinal: No abdominal pain, nausea, vomiting or diarrhea. Genitourinary: No urinary frequency, urgency, hesitancy or dysuria. Musculoskeletal: No joint or muscle pain, no back pain, no recent trauma. Neurologic: No headache, generalized weakness    Objective:     Physical Exam:  Visit Vitals  BP (!) 180/98   Pulse 85   Temp 96.8 °F (36 °C)   Resp 16   Ht 5' 3\" (1.6 m)   Wt 93 kg (205 lb)   SpO2 95%   Breastfeeding No   BMI 36.31 kg/m²        General:         Alert,  no acute distress    HEENT: NC, Atraumatic. PERRLA, anicteric sclerae. Lungs: CTA Bilaterally. No Wheezing/Rhonchi/Rales. Heart:  Regular  rhythm,  No murmur, No Rubs, No Gallops  Abdomen: Soft, Non distended, Non tender.    Extremities: No lower limb edema   Psych:    Not anxious or agitated. Neurologic:  CN 2-12 grossly intact, Alert and oriented X 3. No acute neurological                                 Deficits,     Intake and Output:  Current Shift:  No intake/output data recorded. Last three shifts:  07/22 1901 - 07/24 0700  In: 140 [P.O.:120; I.V.:20]  Out: 950 [Urine:950]    Labs: Results:       Chemistry Recent Labs     07/24/21 0441 07/23/21  0203 07/22/21  0125   * 214* 191*    141 139   K 4.2 3.0* 3.0*   * 108 107   CO2 26 25 23   BUN 10 15 14   CREA 0.48* 0.69 0.69   CA 8.5 8.7 9.0   AGAP 4 8 9   BUCR 21* 22* 20   AP 83 91  --    TP 6.0* 6.1*  --    ALB 2.6* 2.6*  --    GLOB 3.4 3.5  --    AGRAT 0.8 0.7*  --       CBC w/Diff Recent Labs     07/24/21 0441 07/23/21  0203 07/22/21  0125   WBC 9.2 11.3 4.0*   RBC 3.72* 3.70* 3.87*   HGB 11.6* 11.7* 12.2   HCT 35.0 34.3* 35.5    164 151   GRANS 80* 82*  --    LYMPH 10* 8*  --    EOS 0 0  --       Cardiac Enzymes Recent Labs     07/22/21  1410 07/22/21  0125   CPK 35 25*   CKND1 7.1* 5.2*      Coagulation No results for input(s): PTP, INR, APTT, INREXT, INREXT in the last 72 hours.     Lipid Panel Lab Results   Component Value Date/Time Cholesterol, total 104 07/12/2021 04:47 AM    HDL Cholesterol 36 (L) 07/12/2021 04:47 AM    LDL, calculated 45 07/12/2021 04:47 AM    VLDL, calculated 23 07/12/2021 04:47 AM    Triglyceride 115 07/12/2021 04:47 AM    CHOL/HDL Ratio 2.9 07/12/2021 04:47 AM      BNP No results for input(s): BNPP in the last 72 hours.    Liver Enzymes Recent Labs     07/24/21  0441   TP 6.0*   ALB 2.6*   AP 83      Thyroid Studies Lab Results   Component Value Date/Time    T4, Total 5.2 04/10/2012 05:41 PM    TSH 1.42 07/23/2021 02:03 AM          Procedures/imaging: see electronic medical records for all procedures/Xrays and details which were not copied into this note but were reviewed prior to creation of Plan    Medications:   Current Facility-Administered Medications   Medication Dose Route Frequency    [START ON 7/25/2021] dexAMETHasone (DECADRON) tablet 4 mg  4 mg Oral DAILY    [START ON 7/25/2021] amLODIPine (NORVASC) tablet 5 mg  5 mg Oral DAILY    metFORMIN (GLUCOPHAGE) tablet 500 mg  500 mg Oral BID WITH MEALS    insulin lispro (HUMALOG) injection   SubCUTAneous AC&HS    glucose chewable tablet 16 g  4 Tablet Oral PRN    glucagon (GLUCAGEN) injection 1 mg  1 mg IntraMUSCular PRN    dextrose (D50W) injection syrg 12.5-25 g  25-50 mL IntraVENous PRN    levoFLOXacin (LEVAQUIN) tablet 750 mg  750 mg Oral Q24H    budesonide (PULMICORT) 500 mcg/2 ml nebulizer suspension  500 mcg Nebulization BID RT    hydrALAZINE (APRESOLINE) tablet 25 mg  25 mg Oral TID PRN    albuterol-ipratropium (DUO-NEB) 2.5 MG-0.5 MG/3 ML  3 mL Nebulization TID RT    acetaminophen (TYLENOL) tablet 650 mg  650 mg Oral Q6H PRN    albuterol (PROVENTIL HFA, VENTOLIN HFA, PROAIR HFA) inhaler 2 Puff  2 Puff Inhalation Q6H PRN    aspirin chewable tablet 81 mg  81 mg Oral DAILY    levothyroxine (SYNTHROID) tablet 125 mcg  125 mcg Oral 6am    famotidine (PEPCID) tablet 20 mg  20 mg Oral BID    enoxaparin (LOVENOX) injection 40 mg  40 mg SubCUTAneous Q24H    atorvastatin (LIPITOR) tablet 20 mg  20 mg Oral QHS       Assessment/Plan     Active Problems:    GERD (gastroesophageal reflux disease) (2/10/2014)      Hypothyroid (2/10/2014)      Respiratory failure with hypoxia ([address] (7/11/2021)      COVID-19 (7/11/2021)      Acue respiratory failure/COPD/COVID-19 positive test with Acute Pneumonia / post covid syndrome  Patient has respiratory distress O2 sat dropped to 89 on admission, pt off oxygen today no sign of distress   She marsha need to check pulse oximetry with activities   Patient on Trelegy at home we will switch to  Atrovent nebulizer and pulmicort  Pulmicort via nebulizer twice daily  Follow-up respiratory culture  Blood culture  Repeat cardiac enzyme negative , echo pending  Follow-up ID consult will discuss with Dr Asuncion Mendez  Maxipime 2 g IV every 8 hour   decrease Decadron 4 mg daily  Glucose is going higher h/o impaired fasting glucose   Will start Humalog sliding scale    Check venous duplex ultrasound B/L joo and leg positive for Lt sup cephalic vain     Hypokalemia  Resolved   Continue to monitor lab      UTI  Patient did not finish course of treatment Augmentin due to lack of insurance coverage she could not tolerate Augmentin stop medication on her own  recheck urine culture pending  Initial blood culture positive Klebsiella pt on levaquin recheck blood culture  F/u ID       Hypothyroid  Patient on Synthroid 137 mcg daily at home  Will check tsh and free t4      Vitamin D deficiency  Check vitamin D level- pending  Vitamin D3 2000 units daily     Hypertension  Restart Norvasc 5 mg daily  Continue to monitor blood pressure  Hydralazine 25 mg TID Prn SBP above 170     H/O impaired fasting glucose   Restart metformin 500 mg BID   humalog sliding scale      Cbc, cmp, mag in AM  Monitor glucose level with sliding scale  Recheck potassium level  Pt and ot evaluation and treatment   Will need check pulse oximetry with activity before discharge   Cbc, cmp, mag      DVT/GI Prophylaxis: Lovenox, SCD's and H2B/PPI     Atiya Carvajal MD  7/24/2021 1:33 PM

## 2021-07-24 NOTE — PROGRESS NOTES
conducted an initial consultation and Spiritual Assessment for Dayan Peter, who is a 57 y.o.,female. Patients Primary Language is: [place]. According to the patients EMR Zoroastrianism Affiliation is: No preference. The reason the Patient came to the hospital is:   Patient Active Problem List    Diagnosis Date Noted    Respiratory failure with hypoxia ([facility] [address] 07/11/2021    COVID-19 07/11/2021    Chest pain 08/31/2017    Spinal stenosis of lumbar region with radiculopathy 07/02/2014    Degenerative lumbar spinal stenosis 07/02/2014    GERD (gastroesophageal reflux disease) 02/10/2014    Hypertension 02/10/2014    Hypothyroid 02/10/2014        The  provided the following Interventions:  Initiated a relationship of care and support with patient in room 410 today  Listened empathically as patient talked about her being here many times in the past and not being thrilled to be back  Patient states that she has an advance directive but there is no sign of it here,  Provided information about [id number]. Offered prayer and assurance of continued prayers on patients behalf. The following outcomes were achieved:  Patient shared limited information about her medical narrative and spiritual journey/beliefs. Patient processed feeling about current hospitalization. Patient expressed gratitude for pastoral care visit. Assessment:  Patient does not have any Jewish/cultural needs that will affect patients preferences in health care. There are no further spiritual or Jewish issues which require Spiritual Care Services interventions at this time. Plan:  Chaplains will continue to follow and will provide pastoral care on an as needed/requested basis    . Jeb Simon   Spiritual Care   [phone]

## 2021-07-24 NOTE — PROGRESS NOTES
PT order received and chart reviewed. Observed patient ambulating in hallway with steady gait. Spoke with nursing and confirms patient is independent with functional mobility. Will sign off at this time, as PT not necessary.  Thank you for this referral.     Meredith Garcia PT, DPT

## 2021-07-24 NOTE — PROGRESS NOTES
Bedside shift change report given to [facility] (oncoming nurse) by Meg Pierre (offgoing nurse). Report included the following information SBAR, MAR and Recent Results.

## 2021-07-24 NOTE — ROUTINE PROCESS
0715  -- Bedside, Verbal and Written shift change report received by Billy Pond (oncoming nurse) by Carolina(offgoing nurse). Allergy band placed on pt's wrist. Report included the following information SBAR, Kardex, Intake/Output, MAR and Recent Results. 0850--AM  medications administered, pt tolerated with ease, will continue to monitor. 1200 -- Shift reassessment, pt condition unchanged, will continue to monitor. Has been ambulating the halls and tolerating it well, no distress noted. 1600 --  Shift reassessment, pt condition unchanged, will continue to monitor. 1925 -- Bedside, Verbal and Written shift change report given to Carolina(oncoming nurse) by Billy Pond (offgoing nurse). Report included the following information SBAR, Kardex, Intake/Output, MAR and Recent Results. Skin assessment completed.

## 2021-07-25 ENCOUNTER — APPOINTMENT (OUTPATIENT)
Dept: NON INVASIVE DIAGNOSTICS | Age: 61
DRG: 137 | End: 2021-07-25
Attending: FAMILY MEDICINE
Payer: MEDICAID

## 2021-07-25 VITALS
HEIGHT: 63 IN | HEART RATE: 82 BPM | RESPIRATION RATE: 20 BRPM | WEIGHT: 205 LBS | TEMPERATURE: 98 F | BODY MASS INDEX: 36.32 KG/M2 | SYSTOLIC BLOOD PRESSURE: 152 MMHG | OXYGEN SATURATION: 97 % | DIASTOLIC BLOOD PRESSURE: 89 MMHG

## 2021-07-25 LAB
ALBUMIN SERPL-MCNC: 2.6 G/DL (ref 3.4–5)
ALBUMIN/GLOB SERPL: 0.8 {RATIO} (ref 0.8–1.7)
ALP SERPL-CCNC: 83 U/L (ref 45–117)
ALT SERPL-CCNC: 35 U/L (ref 13–56)
ANION GAP SERPL CALC-SCNC: 6 MMOL/L (ref 3–18)
AST SERPL-CCNC: 19 U/L (ref 10–38)
BACTERIA SPEC CULT: ABNORMAL
BACTERIA SPEC CULT: ABNORMAL
BASOPHILS # BLD: 0 K/UL (ref 0–0.1)
BASOPHILS NFR BLD: 0 % (ref 0–2)
BILIRUB SERPL-MCNC: 1 MG/DL (ref 0.2–1)
BUN SERPL-MCNC: 9 MG/DL (ref 7–18)
BUN/CREAT SERPL: 17 (ref 12–20)
CALCIUM SERPL-MCNC: 8.6 MG/DL (ref 8.5–10.1)
CHLORIDE SERPL-SCNC: 110 MMOL/L (ref 100–111)
CO2 SERPL-SCNC: 27 MMOL/L (ref 21–32)
CREAT SERPL-MCNC: 0.54 MG/DL (ref 0.6–1.3)
DIFFERENTIAL METHOD BLD: ABNORMAL
ECHO AO ROOT DIAM: 3.21 CM
ECHO LA AREA 4C: 12.75 CM2
ECHO LA VOL 2C: 55.45 ML (ref 22–52)
ECHO LA VOL 4C: 24.81 ML (ref 22–52)
ECHO LA VOL BP: 41.58 ML (ref 22–52)
ECHO LA VOL/BSA BIPLANE: 21.32 ML/M2 (ref 16–28)
ECHO LA VOLUME INDEX A2C: 28.44 ML/M2 (ref 16–28)
ECHO LA VOLUME INDEX A4C: 12.72 ML/M2 (ref 16–28)
ECHO LV E' LATERAL VELOCITY: 8.45 CM/S
ECHO LV E' SEPTAL VELOCITY: 7.84 CM/S
ECHO LV INTERNAL DIMENSION DIASTOLIC: 5.04 CM (ref 3.9–5.3)
ECHO LV INTERNAL DIMENSION SYSTOLIC: 3.39 CM
ECHO LV IVSD: 1.6 CM (ref 0.6–0.9)
ECHO LV MASS 2D: 316.9 G (ref 67–162)
ECHO LV MASS INDEX 2D: 162.5 G/M2 (ref 43–95)
ECHO LV POSTERIOR WALL DIASTOLIC: 1.34 CM (ref 0.6–0.9)
ECHO LVOT CARDIAC OUTPUT: 5.43 LITER/MINUTE
ECHO LVOT DIAM: 2.14 CM
ECHO LVOT PEAK GRADIENT: 4.66 MMHG
ECHO LVOT PEAK VELOCITY: 107.96 CM/S
ECHO LVOT SV: 76.9 ML
ECHO LVOT VTI: 21.33 CM
ECHO MV A VELOCITY: 85.42 CM/S
ECHO MV E DECELERATION TIME (DT): 298.04 MS
ECHO MV E VELOCITY: 61.77 CM/S
ECHO MV E/A RATIO: 0.72
ECHO MV E/E' LATERAL: 7.31
ECHO MV E/E' RATIO (AVERAGED): 7.59
ECHO MV E/E' SEPTAL: 7.88
ECHO RV TAPSE: 2.69 CM (ref 1.5–2)
EOSINOPHIL # BLD: 0 K/UL (ref 0–0.4)
EOSINOPHIL NFR BLD: 0 % (ref 0–5)
ERYTHROCYTE [DISTWIDTH] IN BLOOD BY AUTOMATED COUNT: 13.5 % (ref 11.6–14.5)
GLOBULIN SER CALC-MCNC: 3.1 G/DL (ref 2–4)
GLUCOSE BLD STRIP.AUTO-MCNC: 110 MG/DL (ref 70–110)
GLUCOSE BLD STRIP.AUTO-MCNC: 112 MG/DL (ref 70–110)
GLUCOSE SERPL-MCNC: 138 MG/DL (ref 74–99)
GRAM STN SPEC: ABNORMAL
HCT VFR BLD AUTO: 33.3 % (ref 35–45)
HGB BLD-MCNC: 11.2 G/DL (ref 12–16)
LVOT MG: 2.19 MMHG
LYMPHOCYTES # BLD: 1.8 K/UL (ref 0.9–3.6)
LYMPHOCYTES NFR BLD: 14 % (ref 21–52)
MAGNESIUM SERPL-MCNC: 2.1 MG/DL (ref 1.6–2.6)
MCH RBC QN AUTO: 32 PG (ref 24–34)
MCHC RBC AUTO-ENTMCNC: 33.6 G/DL (ref 31–37)
MCV RBC AUTO: 95.1 FL (ref 74–97)
MONOCYTES # BLD: 1.3 K/UL (ref 0.05–1.2)
MONOCYTES NFR BLD: 10 % (ref 3–10)
NEUTS SEG # BLD: 9.5 K/UL (ref 1.8–8)
NEUTS SEG NFR BLD: 72 % (ref 40–73)
PLATELET # BLD AUTO: 176 K/UL (ref 135–420)
PMV BLD AUTO: 12.8 FL (ref 9.2–11.8)
POTASSIUM SERPL-SCNC: 3.6 MMOL/L (ref 3.5–5.5)
PROT SERPL-MCNC: 5.7 G/DL (ref 6.4–8.2)
RBC # BLD AUTO: 3.5 M/UL (ref 4.2–5.3)
SERVICE CMNT-IMP: ABNORMAL
SODIUM SERPL-SCNC: 143 MMOL/L (ref 136–145)
WBC # BLD AUTO: 13.1 K/UL (ref 4.6–13.2)

## 2021-07-25 PROCEDURE — 74011250637 HC RX REV CODE- 250/637: Performed by: INTERNAL MEDICINE

## 2021-07-25 PROCEDURE — 94640 AIRWAY INHALATION TREATMENT: CPT

## 2021-07-25 PROCEDURE — 74011250637 HC RX REV CODE- 250/637: Performed by: FAMILY MEDICINE

## 2021-07-25 PROCEDURE — 93306 TTE W/DOPPLER COMPLETE: CPT

## 2021-07-25 PROCEDURE — 74011250636 HC RX REV CODE- 250/636: Performed by: FAMILY MEDICINE

## 2021-07-25 PROCEDURE — 93306 TTE W/DOPPLER COMPLETE: CPT | Performed by: INTERNAL MEDICINE

## 2021-07-25 PROCEDURE — 80053 COMPREHEN METABOLIC PANEL: CPT

## 2021-07-25 PROCEDURE — 74011000250 HC RX REV CODE- 250: Performed by: FAMILY MEDICINE

## 2021-07-25 PROCEDURE — 85025 COMPLETE CBC W/AUTO DIFF WBC: CPT

## 2021-07-25 PROCEDURE — 2709999900 HC NON-CHARGEABLE SUPPLY

## 2021-07-25 PROCEDURE — 82962 GLUCOSE BLOOD TEST: CPT

## 2021-07-25 PROCEDURE — 83735 ASSAY OF MAGNESIUM: CPT

## 2021-07-25 PROCEDURE — 36415 COLL VENOUS BLD VENIPUNCTURE: CPT

## 2021-07-25 RX ORDER — DEXAMETHASONE 1 MG/1
TABLET ORAL
Qty: 6 TABLET | Refills: 0 | Status: SHIPPED | OUTPATIENT
Start: 2021-07-26

## 2021-07-25 RX ORDER — HYDRALAZINE HYDROCHLORIDE 25 MG/1
25 TABLET, FILM COATED ORAL
Qty: 30 TABLET | Refills: 0 | Status: SHIPPED | OUTPATIENT
Start: 2021-07-25

## 2021-07-25 RX ORDER — LEVOFLOXACIN 750 MG/1
750 TABLET ORAL EVERY 24 HOURS
Qty: 3 TABLET | Refills: 0 | Status: SHIPPED | OUTPATIENT
Start: 2021-07-26

## 2021-07-25 RX ORDER — METFORMIN HYDROCHLORIDE 500 MG/1
500 TABLET ORAL 2 TIMES DAILY WITH MEALS
Qty: 60 TABLET | Refills: 0 | Status: SHIPPED | OUTPATIENT
Start: 2021-07-25

## 2021-07-25 RX ORDER — HYDRALAZINE HYDROCHLORIDE 25 MG/1
25 TABLET, FILM COATED ORAL
Status: DISCONTINUED | OUTPATIENT
Start: 2021-07-25 | End: 2021-07-25 | Stop reason: HOSPADM

## 2021-07-25 RX ORDER — AMLODIPINE BESYLATE 5 MG/1
5 TABLET ORAL DAILY
Qty: 30 TABLET | Refills: 1 | Status: SHIPPED | OUTPATIENT
Start: 2021-07-26

## 2021-07-25 RX ADMIN — LEVOFLOXACIN 750 MG: 750 TABLET, FILM COATED ORAL at 09:20

## 2021-07-25 RX ADMIN — DEXAMETHASONE 4 MG: 4 TABLET ORAL at 09:21

## 2021-07-25 RX ADMIN — HYDRALAZINE HYDROCHLORIDE 25 MG: 25 TABLET, FILM COATED ORAL at 12:13

## 2021-07-25 RX ADMIN — AMLODIPINE BESYLATE 5 MG: 5 TABLET ORAL at 09:21

## 2021-07-25 RX ADMIN — FAMOTIDINE 20 MG: 20 TABLET ORAL at 09:20

## 2021-07-25 RX ADMIN — IPRATROPIUM BROMIDE AND ALBUTEROL SULFATE 3 ML: .5; 3 SOLUTION RESPIRATORY (INHALATION) at 07:52

## 2021-07-25 RX ADMIN — LEVOTHYROXINE SODIUM 125 MCG: 125 TABLET ORAL at 08:20

## 2021-07-25 RX ADMIN — BUDESONIDE 500 MCG: 0.5 SUSPENSION RESPIRATORY (INHALATION) at 07:52

## 2021-07-25 RX ADMIN — METFORMIN HYDROCHLORIDE 500 MG: 500 TABLET ORAL at 09:21

## 2021-07-25 RX ADMIN — ASPIRIN 81 MG: 81 TABLET, CHEWABLE ORAL at 09:20

## 2021-07-25 NOTE — PROGRESS NOTES
D/C order noted for today. Orders reviewed. No needs identified at this time. CM remains available if needed.       TIANA MannN, RN  Pager # [phone]  Care Manager

## 2021-07-25 NOTE — PROGRESS NOTES
PATIENT DISCHARGED        Problem: Risk for Spread of Infection  Goal: Prevent transmission of infectious organism to others  Description: Prevent the transmission of infectious organisms to other patients, staff members, and visitors. Outcome: Resolved/Met     Problem: Patient Education:  Go to Education Activity  Goal: Patient/Family Education  Outcome: Resolved/Met     Problem: Falls - Risk of  Goal: *Absence of Falls  Description: Document Brendan Fall Risk and appropriate interventions in the flowsheet. Outcome: Resolved/Met  Note: Fall Risk Interventions:            Medication Interventions: Teach patient to arise slowly         History of Falls Interventions: Evaluate medications/consider consulting pharmacy         Problem: Patient Education: Go to Patient Education Activity  Goal: Patient/Family Education  Outcome: Resolved/Met     Problem: Airway Clearance - Ineffective  Goal: Achieve or maintain patent airway  Outcome: Resolved/Met     Problem: Gas Exchange - Impaired  Goal: Absence of hypoxia  Outcome: Resolved/Met  Goal: Promote optimal lung function  Outcome: Resolved/Met     Problem: Breathing Pattern - Ineffective  Goal: Ability to achieve and maintain a regular respiratory rate  Outcome: Resolved/Met     Problem:  Body Temperature -  Risk of, Imbalanced  Goal: Ability to maintain a body temperature within defined limits  Outcome: Resolved/Met  Goal: Will regain or maintain usual level of consciousness  Outcome: Resolved/Met  Goal: Complications related to the disease process, condition or treatment will be avoided or minimized  Outcome: Resolved/Met     Problem: Isolation Precautions - Risk of Spread of Infection  Goal: Prevent transmission of infectious organism to others  Outcome: Resolved/Met     Problem: Nutrition Deficits  Goal: Optimize nutrtional status  Outcome: Resolved/Met     Problem: Risk for Fluid Volume Deficit  Goal: Maintain normal heart rhythm  Outcome: Resolved/Met  Goal: Maintain absence of muscle cramping  Outcome: Resolved/Met  Goal: Maintain normal serum potassium, sodium, calcium, phosphorus, and pH  Outcome: Resolved/Met     Problem: Loneliness or Risk for Loneliness  Goal: Demonstrate positive use of time alone when socialization is not possible  Outcome: Resolved/Met     Problem: Fatigue  Goal: Verbalize increase energy and improved vitality  Outcome: Resolved/Met     Problem: Patient Education: Go to Patient Education Activity  Goal: Patient/Family Education  Outcome: Resolved/Met     Problem: Hypertension  Goal: *Blood pressure within specified parameters  Outcome: Resolved/Met  Goal: *Fluid volume balance  Outcome: Resolved/Met  Goal: *Labs within defined limits  Outcome: Resolved/Met     Problem: Patient Education: Go to Patient Education Activity  Goal: Patient/Family Education  Outcome: Resolved/Met

## 2021-07-25 NOTE — PROGRESS NOTES
1415 Bedside shift change report given to Narda Rodriguez RN   (oncoming nurse) by Carlos Zhang RN (offgoing nurse). Report included the following information SBAR, Kardex, Intake/Output and Accordion. 0279 patient given morning medications. Patient pleasant and sitting at edge of bed now. Patient would like to know if her physician has discharged her yet, would like to \"get the ball rolling\" because her family members that are able to transport her home and available now and probably not later. Patient worried about her PIV, worried because it bothers her. Assured her after flush that PIV is ok, will take it out when she is discharged. Patient ok with this. 1214 hydralazine given now. 1302 walk test completed, baseline at 95% while sitting in bed. Walking in hallway on room air, ranges from 94%-95%. No difficulties walking or breathing. 1420 patient given discharge instructions, all questions asked    [phone] patient discharged home to mother.

## 2021-07-25 NOTE — DISCHARGE SUMMARY
Discharge Summary     Patient: Dayan Peter MRN: [id number]  SSN: [id number]    YOB: 1960  Age: 57 y.o. Sex: female       Admit Date: 7/21/2021    Discharge Date: 7/25/2021      Admission Diagnoses: Respiratory failure with hypoxia ([facility] [address] [J96.91]  [id number]-[id number] [U07.1]    Discharge Diagnoses:   Problem List as of 7/25/2021 Date Reviewed: 7/21/2021        Codes Class Noted - Resolved    Respiratory failure with hypoxia [facility]) ICD-10-CM: J96.91  ICD-9-CM: 518.81  7/11/2021 - Present        COVID-19 ICD-10-CM: U07.1  ICD-9-CM: 079.89  7/11/2021 - Present        Chest pain ICD-10-CM: R07.9  ICD-9-CM: 786.50  8/31/2017 - Present        Spinal stenosis of lumbar region with radiculopathy ICD-10-CM: M48.061, M54.16  ICD-9-CM: 724.02, 724.4  7/2/2014 - Present        Degenerative lumbar spinal stenosis ICD-10-CM: M48.061  ICD-9-CM: 724.02  7/2/2014 - Present        GERD (gastroesophageal reflux disease) (Chronic) ICD-10-CM: K21.9  ICD-9-CM: 530.81  2/10/2014 - Present        Hypertension (Chronic) ICD-10-CM: I10  ICD-9-CM: 401.9  2/10/2014 - Present        Hypothyroid (Chronic) ICD-10-CM: E03.9  ICD-9-CM: 244.9  2/10/2014 - Present               Discharge Condition: Good      HPI  Dayan Winslow is a 57 y.o.  female who  history of Covid infection COPD and anxiety hypothyroid hypertension ovarian cancer 2010.     Patient was admitted July 12 after being sick for a week fever congestion runny nose cough headache. Patient received 2 doses of remdesivir and Decadron IV and IV Rocephin with good response patient felt better declined to stay for further treatment declined to wait for the result of urine culture patient was discharged on Vantin 200 mg twice a day. Patient could not get Vantin due to insurance coverage Vs vailability from her pharmacy no call from pharmacy to our office. she was switched to Augmentin as outpatient.   Unfortunately patient stopped the medication on her own due to side effect. Pt  didnt let us know that she had side effect with swelling of the face when she start Augmentin.     Patient presented to the office on the day of admission  seen by Dr. Maryana Erazo for follow-up she was in respiratory distress pulse oximetry 89% room air she was sent to the ER for further evaluation and treatment     Patient in the ER was retested for Covid and she she still remains positive. Patient had repeat CTA of the chest no pulmonary embolus but CT scan showed progression of the diffuse pulmonary process was consistent with infection COVID-19 is a on the differential diagnosis     Patient was given Maxipime 2 g IV and Zithromax 500 mg IVx1 sputum culture and blood cultures was sent . Urine analysis still showing sign of infection.     Pt had ID consult with  Dr. Em aBron started pt on patient on Decadron 6 mg IV daily. Continue Maxipime 2 g IV every 8 hour until urine culture and blood culture.     Initial lab work in the ER white blood cells 11.1 H&H 14.3/40.0 platelets 944     Sodium 137 potassium 2.6 creatinine 0.62 ALT 59 AST 55 respectively  Procalcitonin less than 1.05  C-reactive protein 9  D-dimer 1.23     CXR  Interval development of patchy airspace disease in bilateral mid and lower  lungs. Recommend follow-up study for interval improvement. CTA  Negative for pulmonary emboli. . There has been significant progression of the  diffuse pulmonary process which is most consistent with infection. Findings are nonspecific as to organism;  Covid 19 is a  differential consideration       EKG  Sinus rhythm with occasional premature ventricular complexes   Cannot rule out Anterior infarct , age undetermined   T wave abnormality, consider inferior ischemia   Abnormal ECG   When compared with ECG of 10-FEB-2020 11:37,   premature ventricular complexes are now present   Non-specific change in ST segment in Anterior leads   T wave inversion now evident in Inferior leads   Inverted T waves have replaced nonspecific T wave abnormality in Anterior   leads   QT has lengthened       Hospital Course:    Acue respiratory failure/COPD/COVID-19 positive with Acute Pneumonia / post covid syndrome  Patient has respiratory distress O2 sat dropped to 89 on admission,   pt off oxygen today no sign of distress  Before discharge 6 minutes walking test O 2 sat above 94%  Patient on Trelegy at home   Follow-up respiratory culture positive yeast   Blood culture initial blood culture one of 2 tube positive Stenotrophomonas discussed with ID repeat blood culture 7/23 negatove most likely contamination   Will continue Levaquin 750 mg total 7 days and taper steroids over 3 days   Repeat cardiac enzyme negative   Started on metformin and Humalog sliding scale   with steroids treatmnet  Check venous duplex ultrasound B/L joo and leg positive for Lt sup cephalic vain  No further anticoagulation needed     Hypokalemia  Resolved   Continue to monitor lab      UTI  Patient did not finish course of treatment Augmentin due to lack of insurance coverage she could not tolerate Augmentin stop medication on her own  recheck urine culture negative  Initial blood culture positive Klebsiella pt on levaquin recheck blood culture  F/u ID       Hypothyroid  Patient on Synthroid 137 mcg daily at home  F/u  tsh and free t4      Vitamin D deficiency  Check vitamin D level- pending  Vitamin D3 2000 units daily     Hypertension  Restart Norvasc 5 mg daily  Continue to monitor blood pressure  Hydralazine 25 mg TID Prn SBP above 170      H/O impaired fasting glucose   Hg a1c 6.3  Restart metformin 500 mg BID   humalog sliding scale      Consults: Infectious Disease    Significant Diagnostic Studies:     Echo 7/25/21 result pending need f/u result as outpatient    Venous duplex ultrasound B/L arm  · No evidence of deep vein thrombosis in the right upper extremity. Right upper extremity veins were visualized in the transverse and longitudinal views. The vessels showed normal color filling and compressibility. Doppler interrogation showed phasic and spontaneous flow. Superficial occlusive thrombosis of the cephalic vein, within the left upper extremity. No DVT demonstrated bilateral upper extremity.       Venous duplex ultrasound leg  · No evidence of acute deep vein thrombosis in the right common femoral, superficial femoral, popliteal, posterior tibial, and peroneal veins. The veins were imaged in the transverse and longitudinal planes. The vessels showed normal color filling and compressibility. Doppler interrogation showed phasic and spontaneous flow. · No evidence of acute deep vein thrombosis in the left common femoral, superficial femoral, popliteal, posterior tibial, and peroneal veins. The veins were imaged in the transverse and longitudinal planes. The vessels showed normal color filling and compressibility. Doppler interrogation showed phasic and spontaneous flow. CTA chest 7/21  Negative for pulmonary emboli. . There has been significant progression of the  diffuse pulmonary process which is most consistent with infection. Findings are  nonspecific as to organism;  Covid 19 is a  differential consideration     Results     Procedure Component Value Units Date/Time    CULTURE, BLOOD [[id number]] Collected: 07/23/21 1440    Order Status: Completed Specimen: Blood Updated: 07/25/21 0646     Special Requests: NO SPECIAL REQUESTS        Culture result: NO GROWTH 2 DAYS       CULTURE, RESPIRATORY/SPUTUM/BRONCH Aris Glidden STAIN [[id number]]  (Abnormal) Collected: 07/22/21 1542    Order Status: Completed Specimen: Sputum Updated: 07/25/21 0829     Special Requests: NO SPECIAL REQUESTS        GRAM STAIN OCCASIONAL WBCS SEEN               OCCASIONAL EPITHELIAL CELLS SEEN            FEW BUDDING YEAST         FEW GRAM POSITIVE RODS        Culture result:       LIGHT NORMAL RESPIRATORY PACO            FEW YEAST       CULTURE, URINE [[id number]] Collected: 07/21/21 2215    Order Status: Completed Specimen: Clean catch Updated: 07/23/21 0855     Special Requests: NO SPECIAL REQUESTS        Culture result:       No significant growth, <10,000 CFU/mL          CULTURE, BLOOD [[id number]] Collected: 07/21/21 1305    Order Status: Completed Specimen: Blood Updated: 07/25/21 0646     Special Requests: LEFT AC     Culture result: NO GROWTH 4 DAYS       RESPIRATORY VIRUS PANEL W/COVID-19, PCR [[id number]]  (Abnormal) Collected: 07/21/21 1153    Order Status: Completed Specimen: Nasopharyngeal Updated: 07/21/21 1304     Adenovirus Not detected        Coronavirus 229E Not detected        Coronavirus HKU1 Not detected        Coronavirus CVNL63 Not detected        Coronavirus OC43 Not detected        SARS-CoV-2, PCR Detected        Comment: CALLED TO AND CORRECTLY REPEATED BY:  ANNIE Villanueva RN, ED, 2946 7/21/21 TO DRDANG          Metapneumovirus Not detected        Rhinovirus and Enterovirus Not detected        Influenza A Not detected        Influenza B Not detected        Parainfluenza 1 Not detected        Parainfluenza 2 Not detected        Parainfluenza 3 Not detected        Parainfluenza virus 4 Not detected        RSV by PCR Not detected        B. parapertussis, PCR Not detected        Bordetella pertussis - PCR Not detected        Chlamydophila pneumoniae DNA, QL, PCR Not detected        Mycoplasma pneumoniae DNA, QL, PCR Not detected       CULTURE, BLOOD [[id number]]  (Abnormal)  (Susceptibility) Collected: 07/21/21 1058    Order Status: Completed Specimen: Blood Updated: 07/25/21 1238     Special Requests: RT AC     GRAM STAIN       AEROBIC BOTTLE GRAM NEGATIVE RODS                  SMEAR CALLED TO AND CORRECTLY REPEATED BY: BECKIE LAL RN, 4S AT 1315 7/23/21 TO DR           Culture result:       STENOTROPHOMONAS [facility] MALTOPHILIA GROWING IN THIS AEROBIC BOTTLE (SITE= R HAND) *TRIMETH/SULFA SENSITIVITY TO FOLLOW          Susceptibility      Stenotrophomonas maltophilia CHARITO (Preliminary)      Levofloxacin ($) Susceptible               Linear View                   CULTURE, BLOOD [[id number]] Collected: 07/21/21 1045    Order Status: Canceled Specimen: Blood         Recent Results (from the past 24 hour(s))   GLUCOSE, POC    Collection Time: 07/24/21  3:46 PM   Result Value Ref Range    Glucose (POC) 210 (H) 70 - 110 mg/dL   GLUCOSE, POC    Collection Time: 07/24/21 10:08 PM   Result Value Ref Range    Glucose (POC) 153 (H) 70 - 110 mg/dL   CBC WITH AUTOMATED DIFF    Collection Time: 07/25/21  3:26 AM   Result Value Ref Range    WBC 13.1 4.6 - 13.2 K/uL    RBC 3.50 (L) 4.20 - 5.30 M/uL    HGB 11.2 (L) 12.0 - 16.0 g/dL    HCT 33.3 (L) 35.0 - 45.0 %    MCV 95.1 74.0 - 97.0 FL    MCH 32.0 24.0 - 34.0 PG    MCHC 33.6 31.0 - 37.0 g/dL    RDW 13.5 11.6 - 14.5 %    PLATELET 536 807 - 173 K/uL    MPV 12.8 (H) 9.2 - 11.8 FL    NEUTROPHILS 72 40 - 73 %    LYMPHOCYTES 14 (L) 21 - 52 %    MONOCYTES 10 3 - 10 %    EOSINOPHILS 0 0 - 5 %    BASOPHILS 0 0 - 2 %    ABS. NEUTROPHILS 9.5 (H) 1.8 - 8.0 K/UL    ABS. LYMPHOCYTES 1.8 0.9 - 3.6 K/UL    ABS. MONOCYTES 1.3 (H) 0.05 - 1.2 K/UL    ABS. EOSINOPHILS 0.0 0.0 - 0.4 K/UL    ABS. BASOPHILS 0.0 0.0 - 0.1 K/UL    DF AUTOMATED     METABOLIC PANEL, COMPREHENSIVE    Collection Time: 07/25/21  3:26 AM   Result Value Ref Range    Sodium 143 136 - 145 mmol/L    Potassium 3.6 3.5 - 5.5 mmol/L    Chloride 110 100 - 111 mmol/L    CO2 27 21 - 32 mmol/L    Anion gap 6 3.0 - 18 mmol/L    Glucose 138 (H) 74 - 99 mg/dL    BUN 9 7.0 - 18 MG/DL    Creatinine 0.54 (L) 0.6 - 1.3 MG/DL    BUN/Creatinine ratio 17 12 - 20      GFR est AA >60 >60 ml/min/1.73m2    GFR est non-AA >60 >60 ml/min/1.73m2    Calcium 8.6 8.5 - 10.1 MG/DL    Bilirubin, total 1.0 0.2 - 1.0 MG/DL    ALT (SGPT) 35 13 - 56 U/L    AST (SGOT) 19 10 - 38 U/L    Alk.  phosphatase 83 45 - 117 U/L    Protein, total 5.7 (L) 6.4 - 8.2 g/dL    Albumin 2.6 (L) 3.4 - 5.0 g/dL    Globulin 3.1 2.0 - 4.0 g/dL A-G Ratio 0.8 0.8 - 1.7     MAGNESIUM    Collection Time: 07/25/21  3:26 AM   Result Value Ref Range    Magnesium 2.1 1.6 - 2.6 mg/dL   GLUCOSE, POC    Collection Time: 07/25/21  6:23 AM   Result Value Ref Range    Glucose (POC) 112 (H) 70 - 110 mg/dL   GLUCOSE, POC    Collection Time: 07/25/21 11:26 AM   Result Value Ref Range    Glucose (POC) 110 70 - 110 mg/dL   ECHO ADULT COMPLETE    Collection Time: 07/25/21 12:40 PM   Result Value Ref Range    IVSd 1.60 (A) 0.60 - 0.90 cm    LVIDd 5.04 3.90 - 5.30 cm    LVIDs 3.39 cm    LVOT d 2.14 cm    LVPWd 1.34 (A) 0.60 - 0.90 cm    LVOT Cardiac Output 5.43 liter/minute    LVOT Peak Gradient 4.66 mmHg    Left Ventricular Outflow Tract Mean Gradient 2.19 mmHg    LVOT SV 76.9 mL    LVOT Peak Velocity 107.96 cm/s    LVOT VTI 21.33 cm    LA Volume 41.58 22.0 - 52.0 mL    LA Area 4C 12.75 cm2    LA Vol 2C 55.45 (A) 22.00 - 52.00 mL    LA Vol 4C 24.81 22.00 - 52.00 mL    MV A Wilber 85.42 cm/s    Mitral Valve E Wave Deceleration Time 298.04 ms    MV E Wilber 61.77 cm/s    E/E' lateral 7.31     E/E' septal 7.88     LV E' Lateral Velocity 8.45 cm/s    LV E' Septal Velocity 7.84 cm/s    Tapse 2.69 (A) 1.50 - 2.00 cm    Ao Root D 3.21 cm     Physical Examination:   General:         Alert,  no acute distress    HEENT:           NC, Atraumatic. PERRLA, anicteric sclerae. Lungs:            CTA Bilaterally. No Wheezing/Rhonchi/Rales. Heart:              Regular  rhythm,  No murmur, No Rubs, No Gallops  Abdomen:      Soft, Non distended, Non tender.    Extremities:   No lower limb edema   Psych:             Not anxious or agitated. Neurologic:    CN 2-12 grossly intact, Alert and oriented X 3. No acute neurological                                 Deficits,        Disposition: home with home health    Discharge Medications:   Current Discharge Medication List      START taking these medications    Details   amLODIPine (NORVASC) 5 mg tablet Take 1 Tablet by mouth daily.   Qty: 30 Tablet, Refills: 1      dexAMETHasone (DECADRON) 1 mg tablet 3 tab po daily for one day then 2 tab daily for one day  Then one tab daily for one day  Qty: 6 Tablet, Refills: 0      hydrALAZINE (APRESOLINE) 25 mg tablet Take 1 Tablet by mouth three (3) times daily as needed for Other (SBP above 160). Qty: 30 Tablet, Refills: 0      levoFLOXacin (LEVAQUIN) 750 mg tablet Take 1 Tablet by mouth every twenty-four (24) hours. Qty: 3 Tablet, Refills: 0      metFORMIN (GLUCOPHAGE) 500 mg tablet Take 1 Tablet by mouth two (2) times daily (with meals). Qty: 60 Tablet, Refills: 0         CONTINUE these medications which have NOT CHANGED    Details   levothyroxine (SYNTHROID) 137 mcg tablet Take 137 mcg by mouth every morning. Qty: 30 Tablet, Refills: 0      acetaminophen (TYLENOL) 325 mg tablet Take 2 Tablets by mouth every four (4) hours as needed for Pain. Qty: 30 Tablet, Refills: 0      atorvastatin (LIPITOR) 20 mg tablet Take 1 Tablet by mouth nightly. Qty: 30 Tablet, Refills: 1      famotidine (PEPCID) 20 mg tablet Take 1 Tablet by mouth two (2) times a day. Qty: 17 Tablet, Refills: 0      aspirin 81 mg chewable tablet Take 81 mg by mouth daily. cholecalciferol, vitamin D3, (VITAMIN D3) 50 mcg (2,000 unit) tab Take  by mouth every seven (7) days. cyanocobalamin (VITAMIN B-12) 500 mcg tablet Take 500 mcg by mouth daily. fluticasone-umeclidinium-vilanterol (TRELEGY ELLIPTA) 100-62.5-25 mcg inhaler Take 1 Puff by inhalation daily. Pt will stop 48 hrs prior to surgery      mv-min-vit C-Glu-Tawana [id number] (A[place]NE, WITH LYSINE ACETATE,) 250-12.5 mg chew Take 2 Tabs by mouth. STOP taking these medications       cefpodoxime (VANTIN) 200 mg tablet Comments:   Reason for Stopping:               Activity: Activity as tolerated  Diet: Cardiac Diet and Diabetic Diet  Wound Care: None needed    Follow-up Appointments   Procedures    FOLLOW UP VISIT Appointment in: 3 - 5 Days F/U Dr Cesilia Varma 7/28/21 . Home health monitor Bp , vitals and medication management     F/U Dr Sanjay Martinez 7/28/21 .  Home health monitor Bp , vitals and medication management     Standing Status:   Standing     Number of Occurrences:   1     Order Specific Question:   Appointment in     Answer:   3 - 5 Days     Time for discharge more than 40 minutes included review chart review result discussion with ID med reconciliation and print new med coordination of care with home care and  and documentation    Signed By: Chace Clark MD     July 25, 2021

## 2021-07-25 NOTE — PROGRESS NOTES
Bedside shift change report given to Lyssa LEOS (oncoming nurse) by Cholo Vernon (offgoing nurse). Report included the following information SBAR and MAR. Patient currently ambulating in halls without any distress. She was re-educated on proper way of wearing face mask and hand hygiene when coughing. Will continue to monitor.

## 2021-07-25 NOTE — DISCHARGE INSTRUCTIONS
Patient Education     DISCHARGE SUMMARY from Nurse    PATIENT INSTRUCTIONS:    After general anesthesia or intravenous sedation, for 24 hours or while taking prescription Narcotics:  · Limit your activities  · Do not drive and operate hazardous machinery  · Do not make important personal or business decisions  · Do  not drink alcoholic beverages  · If you have not urinated within 8 hours after discharge, please contact your surgeon on call. Report the following to your surgeon:  · Excessive pain, swelling, redness or odor of or around the surgical area  · Temperature over 100.5  · Nausea and vomiting lasting longer than 4 hours or if unable to take medications  · Any signs of decreased circulation or nerve impairment to extremity: change in color, persistent  numbness, tingling, coldness or increase pain  · Any questions    What to do at Home:  Recommended activity: Activity as tolerated. If you experience any of the following symptoms ***, please follow up with ***. *  Please give a list of your current medications to your Primary Care Provider. *  Please update this list whenever your medications are discontinued, doses are      changed, or new medications (including over-the-counter products) are added. *  Please carry medication information at all times in case of emergency situations. These are general instructions for a healthy lifestyle:    No smoking/ No tobacco products/ Avoid exposure to second hand smoke  Surgeon General's Warning:  Quitting smoking now greatly reduces serious risk to your health.     Obesity, smoking, and sedentary lifestyle greatly increases your risk for illness    A healthy diet, regular physical exercise & weight monitoring are important for maintaining a healthy lifestyle    You may be retaining fluid if you have a history of heart failure or if you experience any of the following symptoms:  Weight gain of 3 pounds or more overnight or 5 pounds in a week, increased swelling in our hands or feet or shortness of breath while lying flat in bed. Please call your doctor as soon as you notice any of these symptoms; do not wait until your next office visit. The discharge information has been reviewed with the {PATIENT PARENT GUARDIAN:32586}. The {PATIENT PARENT GUARDIAN:14322} verbalized understanding. Discharge medications reviewed with the {Dishcarge meds reviewed BUYI:90751} and appropriate educational materials and side effects teaching were provided. ___________________________________________________________________________________________________________________________________     Learning About Coronavirus (KBJSC-32)  What is coronavirus (COVID-19)? COVID-19 is a disease caused by a new type of coronavirus. This illness was first found in December 2019. It has since spread worldwide. Coronaviruses are a large group of viruses. They cause the common cold. They also cause more serious illnesses like [place] respiratory syndrome (MERS) and severe acute respiratory syndrome (SARS). COVID-19 is caused by a novel coronavirus. That means it's a new type that has not been seen in people before. What are the symptoms? Coronavirus (COVID-19) symptoms may include:  · Fever. · Cough. · Trouble breathing. · Chills or repeated shaking with chills. · Muscle pain. · Headache. · Sore throat. · New loss of taste or smell. · Vomiting. · Diarrhea. In severe cases, COVID-19 can cause pneumonia and make it hard to breathe without help from a machine. It can cause death. How is it diagnosed? COVID-19 is diagnosed with a viral test. This may also be called a PCR test or antigen test. It looks for evidence of the virus in your breathing passages or lungs (respiratory system). The test is most often done on a sample from the nose, throat, or lungs. It's sometimes done on a sample of saliva.  One way a sample is collected is by putting a long swab into the back of your nose. How is it treated? Mild cases of COVID-19 can be treated at home. Serious cases need treatment in the hospital. Treatment may include medicines to reduce symptoms, plus breathing support such as oxygen therapy or a ventilator. Some people may be placed on their belly to help their oxygen levels. Treatments that may help people who have COVID-19 include:  Antiviral medicines. These medicines treat viral infections. Remdesivir is an example. Immune-based therapy. These medicines help the immune system fight COVID-19. One example is bamlanivimab. It's a monoclonal antibody. Blood thinners. These medicines help prevent blood clots. People with severe illness are at risk for blood clots. How can you protect yourself and others? The best way to protect yourself from getting sick is to:  · Avoid areas where there is an outbreak. · Avoid contact with people who may be infected. · Avoid crowds and try to stay at least 6 feet away from other people. · Wash your hands often, especially after you cough or sneeze. Use soap and water, and scrub for at least 20 seconds. If soap and water aren't available, use an alcohol-based hand . · Avoid touching your mouth, nose, and eyes. To help avoid spreading the virus to others:  · Stay home if you are sick or have been exposed to the virus. Don't go to school, work, or public areas. And don't use public transportation, ride-shares, or taxis unless you have no choice. · Wear a cloth face cover if you have to go to public areas. · Cover your mouth with a tissue when you cough or sneeze. Then throw the tissue in the trash and wash your hands right away. · If you're sick:  ? Leave your home only if you need to get medical care. But call the doctor's office first so they know you're coming. And wear a face cover. ? Wear the face cover whenever you're around other people. It can help stop the spread of the virus when you cough or sneeze. ?  Limit contact with pets and people in your home. If possible, stay in a separate bedroom and use a separate bathroom. ? Clean and disinfect your home every day. Use household  and disinfectant wipes or sprays. Take special care to clean things that you grab with your hands. These include doorknobs, remote controls, phones, and handles on your refrigerator and microwave. And don't forget countertops, tabletops, bathrooms, and computer keyboards. When should you call for help? Call 911 anytime you think you may need emergency care. For example, call if you have life-threatening symptoms, such as:    · You have severe trouble breathing. (You can't talk at all.)     · You have constant chest pain or pressure.     · You are severely dizzy or lightheaded.     · You are confused or can't think clearly.     · Your face and lips have a blue color.     · You pass out (lose consciousness) or are very hard to wake up. Call your doctor now or seek immediate medical care if:    · You have moderate trouble breathing. (You can't speak a full sentence.)     · You are coughing up blood (more than about 1 teaspoon).     · You have signs of low blood pressure. These include feeling lightheaded; being too weak to stand; and having cold, pale, clammy skin. Watch closely for changes in your health, and be sure to contact your doctor if:    · Your symptoms get worse.     · You are not getting better as expected. Call before you go to the doctor's office. Follow their instructions. And wear a cloth face cover. Current as of: December 18, 2020               Content Version: 12.8  © 2006-2021 [employer]. Care instructions adapted under license by [employer] (which disclaims liability or warranty for this information).  If you have questions about a medical condition or this instruction, always ask your healthcare professional. [place] [address] any warranty or liability for your use of this information.

## 2021-07-26 ENCOUNTER — PATIENT OUTREACH (OUTPATIENT)
Dept: CASE MANAGEMENT | Age: 61
End: 2021-07-26

## 2021-07-26 LAB
BACTERIA SPEC CULT: ABNORMAL
GRAM STN SPEC: ABNORMAL
GRAM STN SPEC: ABNORMAL
SERVICE CMNT-IMP: ABNORMAL

## 2021-07-26 NOTE — PROGRESS NOTES
Care Transitions Initial Call    Call within 2 business days of discharge: Yes     Patient: April PATRICIA Peter Patient : 1960 MRN: [id number]    Last Discharge [address] [facility]       Complaint Diagnosis Description Type Department Provider    21 Shortness of Breath Acute respiratory failure with hypoxia ([facility] [address] . .. ED to Hosp-Admission (Discharged) (ADMIT) Christine Argueta MD; Savana Carrera,... Was this an external facility discharge? No [facility]    Challenges to be reviewed by the provider   Additional needs identified to be addressed with provider: no  none         Method of communication with provider : none    Discussed COVID-19 related testing which was available at this time. Test results were positive. Patient informed of results, if available? yes     Advance Care Planning:   Does patient have an Advance Directive: decision makers updated. Inpatient Readmission Risk score: Unplanned Readmit Risk Score: 12    Was this a readmission? yes   Patient stated reason for the admission: respiratory failure with hypoxia    Patients top risk factors for readmission: medical condition-respiratory failure with hypoxia and support system   Interventions to address risk factors: Scheduled appointment with PCP-Dr Shah    Care Transition Nurse (CTN) contacted the patient by telephone to perform post hospital discharge assessment. Verified name and  with patient as identifiers. Provided introduction to self, and explanation of the CTN role. CTN reviewed discharge instructions, medical action plan and red flags with patient who verbalized understanding. Were discharge instructions available to patient? yes. Reviewed appropriate site of care based on symptoms and resources available to patient including: PCP and MyChart Messaging. Patient given an opportunity to ask questions and does not have any further questions or concerns at this time.  The patient agrees to contact the PCP office for questions related to their healthcare. Medication reconciliation was performed with patient, who verbalizes understanding of administration of home medications. Advised obtaining a 90-day supply of all daily and as-needed medications. Referral to Pharm D needed: no     Home Health/Outpatient orders at discharge: [address]: n/a  Date of initial visit: [facility] ordered at discharge: None  [address]: [address] [facility] received: n/a    Covid Risk Education    Educated patient about risk for severe COVID-19 due to risk factors according to CDC guidelines. CTN reviewed discharge instructions, medical action plan and red flag symptoms with the patient who verbalized understanding. Discussed COVID vaccination status: no. Education provided on COVID-19 vaccination as appropriate. Discussed exposure protocols and quarantine with CDC Guidelines. Patient was given an opportunity to verbalize any questions and concerns and agrees to contact CTN or health care provider for questions related to their healthcare. Was patient discharged with a pulse oximeter? no. Discussed and confirmed pulse oximeter discharge instructions and when to notify provider or seek emergency care. Discussed follow-up appointments. If no appointment was previously scheduled, appointment scheduling offered: yes. Is follow up appointment scheduled within 7 days of discharge? yes. [facility] follow up appointment(s):   Future Appointments   Date Time Provider Richard Falcon   7/27/2021  8:00 AM HBV CHRISTIN RM 4 3D HBVRMAM HBV   7/27/2021  8:30 AM HBV BONE DEXA RM 1 HBVRBD HBV     [facility] follow up appointment(s): sees PCP 7/28/2021 at 5:15 PM    Plan for follow-up call in 7-10 days based on severity of symptoms and risk factors. Plan for next call: medication management-ensure that she has all meds and is taking as directed. CTN provided contact information for future needs. Goals Addressed                 This Visit's Progress     Prevent complications post hospitalization. 1. CTN will monitor X 4 weeks    2. Ensure provider appt is scheduled within 7 days post-discharge 7/14/2021 Patient stated that she has an appt today for follow up. 7/26/2021 Patient has PCP follow up Wed at 5:15 PM    3. Confirm patient attended post-discharge provider apt    4. Complete post-visit call to confirm attendance and update care needs  7/14/2021 Patient stated that she feels so much better. She appreciated the care received while an inpatient. No care needs noted. Patient stated that she is getting stronger and more energy. 7/26/2021 Patient stated that she is doing great today. She is taking her time getting back to activities. 5. Review/educate common or potential \"red flags\" of condition worsening 7/14/2021 Reviewed signs/symptoms of respiratory failure such as restlessness, anxiety, sleepiness, loss of consciousness, rapid and shallow breathing, racing heart, irregular heartbeats, and profuse sweating to name a few. 7/26/2021 Reviewed signs/symptoms of respiratory failure as above as she was readmitted for the same. 6. Evaluate adherence to medications and priority barriers to resolve 7/14/2021 Patient stated that she has all meds and is taking as prescribed. 7/26/2021 Patient has all meds and is taking as directed. 7. Instruct on adherence to medications as ordered and assess for therapeutic response and side-effects  7/14/2021 Patient is aware of why she takes each medication and knows when she needs to call physician for any issues. 7/26/2021 Patient is aware of why she takes each medication and knows when to call physician. 8. Discuss and evaluate ADL performance. Provide recommendations on energy conservation, particularly related to transition home from an inpatient admission.  7/14/2021 Patient stated that she is getting stronger each day. She stated that the medications given to her were really working. She plans to get back to work next week. 7/26/2021 Patient stated that she is doing well. She was hit hard with second admission- doing better now and getting stronger day by day.

## 2021-07-27 LAB
BACTERIA SPEC CULT: NORMAL
SERVICE CMNT-IMP: NORMAL

## 2021-07-29 LAB
BACTERIA SPEC CULT: NORMAL
SERVICE CMNT-IMP: NORMAL

## 2021-08-10 ENCOUNTER — PATIENT OUTREACH (OUTPATIENT)
Dept: CASE MANAGEMENT | Age: 61
End: 2021-08-10

## 2021-08-10 NOTE — PROGRESS NOTES
8/10/2021 8:36 AM    CTN reviewed patient chart for routine follow up. Attempted to call patient and she was unavailable at the time of the call. Message left introducing myself, the purpose of the call and giving my contact information. Requested that patient call back at her earliest convenience. Will follow.

## 2021-08-17 ENCOUNTER — PATIENT OUTREACH (OUTPATIENT)
Dept: CASE MANAGEMENT | Age: 61
End: 2021-08-17

## 2021-08-17 ENCOUNTER — HOSPITAL ENCOUNTER (OUTPATIENT)
Dept: BONE DENSITY | Age: 61
Discharge: HOME | End: 2021-08-17
Attending: FAMILY MEDICINE
Payer: MEDICAID

## 2021-08-17 ENCOUNTER — HOSPITAL ENCOUNTER (OUTPATIENT)
Dept: MAMMOGRAPHY | Age: 61
Discharge: HOME | End: 2021-08-17
Attending: FAMILY MEDICINE
Payer: MEDICAID

## 2021-08-17 DIAGNOSIS — Z13.820 SPECIAL SCREENING FOR OSTEOPOROSIS: ICD-10-CM

## 2021-08-17 DIAGNOSIS — Z12.31 VISIT FOR SCREENING MAMMOGRAM: ICD-10-CM

## 2021-08-17 DIAGNOSIS — M81.0 SENILE OSTEOPOROSIS: ICD-10-CM

## 2021-08-17 PROCEDURE — 77080 DXA BONE DENSITY AXIAL: CPT

## 2021-08-17 PROCEDURE — 77063 BREAST TOMOSYNTHESIS BI: CPT

## 2021-08-17 NOTE — PROGRESS NOTES
Care Transitions Follow Up Call    Challenges to be reviewed by the provider   Additional needs identified to be addressed with provider: no  none           Method of communication with provider : none    Care Transition Nurse (CTN) contacted the patient by telephone to follow up after admission on 2021. Verified name and  with patient as identifiers. Addressed changes since last contact: none  Follow up appointment completed? yes. Was follow up appointment scheduled within 7 days of discharge? yes. Advance Care Planning:   Does patient have an Advance Directive: decision makers updated. CTN reviewed discharge instructions, medical action plan and red flags with patient and discussed any barriers to care and/or understanding of plan of care after discharge. Discussed appropriate site of care based on symptoms and resources available to patient including: PCP, Specialist and [address] [place] [address] Road. The patient agrees to contact the PCP office for questions related to their healthcare. Patients top risk factors for readmission: medical condition-respiratory failure and support system   Interventions to address risk factors: Scheduled appointment with PCP-[facility] follow up appointment(s):   Future Appointments   Date Time Provider Richard Falcon   2021  9:15 AM HBV CHRISTIN RM 3 3D HBVRMAM HBV   2021  9:30 AM HBV BONE DEXA RM 1 HBVRBD HBV     Non-[facility] follow up appointment(s): n/a    CTN provided contact information for future needs. Plan for follow-up call in 7-10 days based on severity of symptoms and risk factors. Plan for next call: medication management-ensure that patient has meds and is taking as directed. Goals Addressed                 This Visit's Progress     Prevent complications post hospitalization. 1. CTN will monitor X 4 weeks    2.  Ensure provider appt is scheduled within 7 days post-discharge 2021 Patient stated that she has an appt today for follow up. 7/26/2021 Patient has PCP follow up Wed at 5:15 PM    3. Confirm patient attended post-discharge provider apt 8/17/2021 Patient stated that she did see her PCP    4. Complete post-visit call to confirm attendance and update care needs  7/14/2021 Patient stated that she feels so much better. She appreciated the care received while an inpatient. No care needs noted. Patient stated that she is getting stronger and more energy. 7/26/2021 Patient stated that she is doing great today. She is taking her time getting back to activities. 8/17/2021 Patient stated that she is doing well. She is transitioning back to work and other activities. No care needs noted. 5. Review/educate common or potential \"red flags\" of condition worsening 7/14/2021 Reviewed signs/symptoms of respiratory failure such as restlessness, anxiety, sleepiness, loss of consciousness, rapid and shallow breathing, racing heart, irregular heartbeats, and profuse sweating to name a few. 7/26/2021 Reviewed signs/symptoms of respiratory failure as above as she was readmitted for the same. 6. Evaluate adherence to medications and priority barriers to resolve 7/14/2021 Patient stated that she has all meds and is taking as prescribed. 7/26/2021 Patient has all meds and is taking as directed. 8/17/2021 Patient has all meds and is taking as she has been told. 7. Instruct on adherence to medications as ordered and assess for therapeutic response and side-effects  7/14/2021 Patient is aware of why she takes each medication and knows when she needs to call physician for any issues. 7/26/2021 Patient is aware of why she takes each medication and knows when to call physician. 8/17/2021 Patient stated that she has no concerns about her meds at present time. 8. Discuss and evaluate ADL performance. Provide recommendations on energy conservation, particularly related to transition home from an inpatient admission.  7/14/2021 Patient stated that she is getting stronger each day. She stated that the medications given to her were really working. She plans to get back to work next week. 7/26/2021 Patient stated that she is doing well. She was hit hard with second admission- doing better now and getting stronger day by day. 8/17/2021 Patient stated that she is doing well and she continues to get stronger. She is transitioning to [employer] activities.

## 2021-08-25 ENCOUNTER — PATIENT OUTREACH (OUTPATIENT)
Dept: CASE MANAGEMENT | Age: 61
End: 2021-08-25

## 2021-08-25 NOTE — PROGRESS NOTES
Patient has graduated from the Transitions of Care Coordination  program on 8/25/2021. Patient's symptoms are stable at this time. Patient/family has the ability to self-manage. Care management goals have been completed at this time. No further care transitions nurse follow up scheduled. Goals Addressed                 This Visit's Progress     COMPLETED: Prevent complications post hospitalization. 1. CTN will monitor X 4 weeks    2. Ensure provider appt is scheduled within 7 days post-discharge 7/14/2021 Patient stated that she has an appt today for follow up. 7/26/2021 Patient has PCP follow up Wed at 5:15 PM    3. Confirm patient attended post-discharge provider apt 8/17/2021 Patient stated that she did see her PCP    4. Complete post-visit call to confirm attendance and update care needs  7/14/2021 Patient stated that she feels so much better. She appreciated the care received while an inpatient. No care needs noted. Patient stated that she is getting stronger and more energy. 7/26/2021 Patient stated that she is doing great today. She is taking her time getting back to activities. 8/17/2021 Patient stated that she is doing well. She is transitioning back to work and other activities. No care needs noted. 8/25/2021 patient is doing well. No care needs noted. 5. Review/educate common or potential \"red flags\" of condition worsening 7/14/2021 Reviewed signs/symptoms of respiratory failure such as restlessness, anxiety, sleepiness, loss of consciousness, rapid and shallow breathing, racing heart, irregular heartbeats, and profuse sweating to name a few. 7/26/2021 Reviewed signs/symptoms of respiratory failure as above as she was readmitted for the same. 6. Evaluate adherence to medications and priority barriers to resolve 7/14/2021 Patient stated that she has all meds and is taking as prescribed. 7/26/2021 Patient has all meds and is taking as directed.  8/17/2021 Patient has all meds and is taking as she has been told. 8/25/2021 Patient has all meds and is taking them per physician orders. 7. Instruct on adherence to medications as ordered and assess for therapeutic response and side-effects  7/14/2021 Patient is aware of why she takes each medication and knows when she needs to call physician for any issues. 7/26/2021 Patient is aware of why she takes each medication and knows when to call physician. 8/17/2021 Patient stated that she has no concerns about her meds at present time. 8/25/2021 Patient has no concerns about meds. 8. Discuss and evaluate ADL performance. Provide recommendations on energy conservation, particularly related to transition home from an inpatient admission. 7/14/2021 Patient stated that she is getting stronger each day. She stated that the medications given to her were really working. She plans to get back to work next week. 7/26/2021 Patient stated that she is doing well. She was hit hard with second admission- doing better now and getting stronger day by day. 8/17/2021 Patient stated that she is doing well and she continues to get stronger. She is transitioning to [employer] activities. 8/25/2021 Patient is getting stronger and feels good getting to \"normal\" activities. Patient has care transitions nurse contact information for any further questions, concerns, or needs. Patient's upcoming visits:  No future appointments.

## 2022-03-18 PROBLEM — U07.1 COVID-19: Status: ACTIVE | Noted: 2021-07-11

## 2022-03-19 PROBLEM — R07.9 CHEST PAIN: Status: ACTIVE | Noted: 2017-08-31

## 2022-03-20 PROBLEM — J96.91 RESPIRATORY FAILURE WITH HYPOXIA (HCC): Status: ACTIVE | Noted: 2021-07-11

## 2022-09-08 ENCOUNTER — HOSPITAL ENCOUNTER (EMERGENCY)
Age: 62
Discharge: HOME | End: 2022-09-08
Attending: EMERGENCY MEDICINE
Payer: MEDICAID

## 2022-09-08 ENCOUNTER — APPOINTMENT (OUTPATIENT)
Dept: GENERAL RADIOLOGY | Age: 62
End: 2022-09-08
Attending: EMERGENCY MEDICINE
Payer: MEDICAID

## 2022-09-08 VITALS
TEMPERATURE: 99.3 F | RESPIRATION RATE: 18 BRPM | BODY MASS INDEX: 36.32 KG/M2 | HEIGHT: 63 IN | HEART RATE: 80 BPM | WEIGHT: 205 LBS | OXYGEN SATURATION: 97 %

## 2022-09-08 DIAGNOSIS — S93.601A FOOT SPRAIN, RIGHT, INITIAL ENCOUNTER: Primary | ICD-10-CM

## 2022-09-08 PROCEDURE — 73630 X-RAY EXAM OF FOOT: CPT

## 2022-09-08 PROCEDURE — 99283 EMERGENCY DEPT VISIT LOW MDM: CPT

## 2022-09-08 RX ORDER — IBUPROFEN 400 MG/1
800 TABLET ORAL ONCE
Status: DISCONTINUED | OUTPATIENT
Start: 2022-09-08 | End: 2022-09-09 | Stop reason: HOSPADM

## 2022-09-09 NOTE — ED PROVIDER NOTES
65-year-old female with history of thyroid disease and ovarian cancer presents to the emergency department after rolling her right ankle while getting off a plane at the airport today. Reports pain over her foot but is able to bear weight. Has taken some Tylenol with minimal improvement in the pain.        Past Medical History:   Diagnosis Date    Cancer ([facility] [address]     Ovarian cancer ([facility] [address]     Thyroid disease        Past Surgical History:   Procedure Laterality Date    COLONOSCOPY N/A 5/21/2019    COLONOSCOPY, SCREENING with hot snare polypectomy, w/ Bx performed by Baljeet Chavis MD at [address] [place] [address] ENDOSCOPY    HX HYSTERECTOMY           Family History:   Problem Relation Age of Onset    Thyroid Disease Mother     Breast Cancer Mother     Cancer Brother 24        brain    Cancer Maternal Aunt         lung    Breast Problems Maternal Aunt 29    Breast Cancer Maternal Aunt     Cancer Paternal Aunt     Cancer Paternal Uncle     Cancer Maternal Grandmother     Breast Cancer Maternal Grandmother     Cancer Maternal Grandfather     Cancer Other         child leukemia    Heart Attack Other        Social History     Socioeconomic History    Marital status:      Spouse name: Not on file    Number of children: Not on file    Years of education: Not on file    Highest education level: Not on file   Occupational History    Not on file   Tobacco Use    Smoking status: Every Day    Smokeless tobacco: Never    Tobacco comments:     smokes one cigarette day   Substance and Sexual Activity    Alcohol use: No    Drug use: No    Sexual activity: Never   Other Topics Concern    Not on file   Social History Narrative    Not on file     Social Determinants of Health     Financial Resource Strain: Not on file   Food Insecurity: Not on file   Transportation Needs: Not on file   Physical Activity: Not on file   Stress: Not on file   Social Connections: Not on file   Intimate Partner Violence: Not on file   Housing Stability: Not on file ALLERGIES: Latex, Morphine, Adhesive, Augmentin [amoxicillin-pot clavulanate], and Seafood    Review of Systems   Constitutional:  Negative for chills and fever. HENT:  Negative for sore throat. Eyes:  Negative for visual disturbance. Respiratory:  Negative for shortness of breath. Cardiovascular:  Negative for chest pain. Gastrointestinal:  Negative for abdominal pain, nausea and vomiting. Genitourinary:  Negative for difficulty urinating. Musculoskeletal:  Positive for gait problem. Negative for myalgias. Skin:  Negative for rash. Neurological:  Negative for headaches. Vitals:    09/08/22 2229 09/08/22 2232   Pulse: 80    Resp: 18    Temp:  99.3 °F (37.4 °C)   SpO2: 97%    Weight: 93 kg (205 lb)    Height: 5' 3\" (1.6 m)             Physical Exam  Vitals reviewed. Constitutional:       Appearance: Normal appearance. She is obese. HENT:      Head: Normocephalic and atraumatic. Cardiovascular:      Rate and Rhythm: Normal rate and regular rhythm. Pulses: Normal pulses. Heart sounds: Normal heart sounds. Pulmonary:      Effort: Pulmonary effort is normal.      Breath sounds: Normal breath sounds. Abdominal:      General: Abdomen is flat. Palpations: Abdomen is soft. Musculoskeletal:        Feet:    Skin:     General: Skin is warm and dry. Capillary Refill: Capillary refill takes less than 2 seconds. Neurological:      General: No focal deficit present. Mental Status: She is alert. MDM  Number of Diagnoses or Management Options  Foot sprain, right, initial encounter  Diagnosis management comments: 79-year-old female who rolled her ankle during the process of the boarding an airplane earlier today now comes in with right foot pain. Physical exam does show some evidence of tenderness palpation over the dorsal aspect of the midfoot and the base of the fifth metatarsal.  X-ray showed no evidence of fracture.   Patient was discharged home with recommendation for treatment with RICE, Motrin Tylenol for pain, and follow-up with her primary care provider her pain does not improve.            Procedures

## 2022-09-09 NOTE — DISCHARGE INSTRUCTIONS
You are seen in the emergency department today for sprain right foot. Informed x-rays that showed no evidence of fracture. Please take Motrin and Tylenol for pain. Please follow-up with your primary care provider for further treatment if you do not notice improvement. Please return to the emergency department if you develop any new or concerning symptoms to you.

## 2022-09-09 NOTE — ED TRIAGE NOTES
Patient in airport yesterday when she stepped incorrectly on her right foot. Patient is c/o right foot pain.

## 2022-09-26 ENCOUNTER — TRANSCRIBE ORDER (OUTPATIENT)
Dept: SCHEDULING | Age: 62
End: 2022-09-26

## 2022-09-26 DIAGNOSIS — Z12.31 VISIT FOR SCREENING MAMMOGRAM: Primary | ICD-10-CM

## 2022-11-08 ENCOUNTER — HOSPITAL ENCOUNTER (OUTPATIENT)
Dept: MAMMOGRAPHY | Age: 62
Discharge: HOME | End: 2022-11-08
Attending: FAMILY MEDICINE
Payer: MEDICAID

## 2022-11-08 DIAGNOSIS — Z12.31 VISIT FOR SCREENING MAMMOGRAM: ICD-10-CM

## 2022-11-08 PROCEDURE — 77063 BREAST TOMOSYNTHESIS BI: CPT

## 2022-11-11 ENCOUNTER — OFFICE VISIT (OUTPATIENT)
Dept: CARDIOLOGY CLINIC | Age: 62
End: 2022-11-11
Payer: MEDICAID

## 2022-11-11 VITALS
BODY MASS INDEX: 38.09 KG/M2 | DIASTOLIC BLOOD PRESSURE: 88 MMHG | WEIGHT: 215 LBS | HEART RATE: 72 BPM | OXYGEN SATURATION: 95 % | SYSTOLIC BLOOD PRESSURE: 130 MMHG | HEIGHT: 63 IN

## 2022-11-11 DIAGNOSIS — I49.9 IRREGULAR HEART BEAT: Primary | ICD-10-CM

## 2022-11-11 DIAGNOSIS — R06.00 DYSPNEA, UNSPECIFIED TYPE: ICD-10-CM

## 2022-11-11 DIAGNOSIS — R07.9 CHEST PAIN, UNSPECIFIED TYPE: ICD-10-CM

## 2022-11-11 PROCEDURE — 3074F SYST BP LT 130 MM HG: CPT | Performed by: INTERNAL MEDICINE

## 2022-11-11 PROCEDURE — 3078F DIAST BP <80 MM HG: CPT | Performed by: INTERNAL MEDICINE

## 2022-11-11 PROCEDURE — 99204 OFFICE O/P NEW MOD 45 MIN: CPT | Performed by: INTERNAL MEDICINE

## 2022-11-11 PROCEDURE — 93000 ELECTROCARDIOGRAM COMPLETE: CPT | Performed by: INTERNAL MEDICINE

## 2022-11-11 RX ORDER — NITROGLYCERIN 0.4 MG/1
0.4 TABLET SUBLINGUAL
Qty: 25 TABLET | Refills: 3 | Status: SHIPPED | OUTPATIENT
Start: 2022-11-11

## 2022-11-11 RX ORDER — GUAIFENESIN 100 MG/5ML
81 LIQUID (ML) ORAL DAILY
Qty: 90 TABLET | Refills: 3 | Status: SHIPPED | OUTPATIENT
Start: 2022-11-11

## 2022-11-11 NOTE — PATIENT INSTRUCTIONS
Start taking ASA 81 mg a day  Start taking Nitroglycerine 0.4 mg SL for Chest pain (read instructions with refills)

## 2022-11-11 NOTE — LETTER
11/11/2022    Patient: Dayan Peter   YOB: 1960   Date of Visit: 11/11/2022     Lavern Johnson, [address]  [address]  [place] [address]    Dear Lavern Johnson MD,      Thank you for referring Ms. Dayan Peter to [address] [place] [address] for evaluation. My notes for this consultation are attached. If you have questions, please do not hesitate to call me. I look forward to following your patient along with you.       Sincerely,    Elvin Vanegas MD

## 2022-11-11 NOTE — PROGRESS NOTES
April PATRICIA Peter presents today for   Chief Complaint   Patient presents with    New Patient     Referred by pcp for irregular heart beat    Palpitations     skipping       Dayan Peter preferred language for health care discussion is english/other. Is someone accompanying this pt? no    Is the patient using any DME equipment during [address]? no    Depression Screening:  3 most recent PHQ Screens 11/11/2022   Little interest or pleasure in doing things Not at all   Feeling down, depressed, irritable, or hopeless Not at all   Total Score PHQ 2 0       Learning Assessment:  Learning Assessment 11/11/2022   PRIMARY LEARNER Patient   HIGHEST LEVEL OF EDUCATION - PRIMARY LEARNER  -   BARRIERS PRIMARY LEARNER -   CO-LEARNER CAREGIVER -   PRIMARY LANGUAGE ENGLISH    NEED -   LEARNER PREFERENCE PRIMARY DEMONSTRATION     -   LEARNING SPECIAL TOPICS -   ANSWERED BY pateint   RELATIONSHIP SELF   ASSESSMENT COMMENT -       Abuse Screening:  Abuse Screening Questionnaire 11/11/2022   Do you ever feel afraid of your partner? N   Are you in a relationship with someone who physically or mentally threatens you? N   Is it safe for you to go home? Y       Fall Risk  No flowsheet data found. Pt currently taking Anticoagulant therapy? no    Pt currently taking Antiplatelet therapy ? Aspirin 81 mg daily      Coordination of Care:  1. Have you been to the ER, urgent care clinic since your last visit? Hospitalized since your last visit? no    2. Have you seen or consulted any other health care providers outside of the [address] [place] [address] since your last visit? Include any pap smears or colon screening.  no

## 2022-11-11 NOTE — PROGRESS NOTES
[facility]    Ms. Chaney history of female with a history of hypertension and hyperlipidemia, ovarian cancer rheumatic fever as a child    Patient is here today for cardiac evaluation. She denies prior history of MI or CHF  Patient was sent to me for evaluation of abnormal Holter monitor. Unfortunately I did not have the report of Holter monitoring from primary care provider as of yet. Patient tells me that because of some palpitation, she had a event monitor placed for a week which showed some rapid heartbeat however she was not aware of it. I do not have these records. She denies presyncope syncope. Upon further questioning, she tells me that over the last 1-2 years she has been having exertional dyspnea and chest tightness however she did not mention that to other providers. She said that she would climb 2 flight of stairs and she would have to stop because of shortness of breath and chest tightness which feels like a heavy sensation somebody sitting on the chest lasting for about 5 minutes. No nausea vomiting diaphoresis. No resting discomfort. This appears to be stable overall however going on for about 12 months. No lower extremity swelling. Denies any nausea, vomiting, abdominal pain, fever, chills, sputum production. No hematuria or other bleeding complaints    Past Medical History:   Diagnosis Date    HLD (hyperlipidemia)     HTN (hypertension)     Ovarian cancer ([facility] [address]     Rheumatic fever     Thyroid disease        Review of Systems:  Cardiac symptoms as noted above in HPI. All others negative. Denies fatigue, malaise, skin rash, joint pain, blurring vision, photophobia, neck pain, hemoptysis, chronic cough, nausea, vomiting, hematuria, burning micturition, BRBPR, chronic headaches. Current Outpatient Medications   Medication Sig    aspirin 81 mg chewable tablet Take 1 Tablet by mouth daily.     nitroglycerin (NITROSTAT) 0.4 mg SL tablet 1 Tablet by SubLINGual route every five (5) minutes as needed for Chest Pain. Up to 3 doses. amLODIPine (NORVASC) 5 mg tablet Take 1 Tablet by mouth daily. dexAMETHasone (DECADRON) 1 mg tablet 3 tab po daily for one day then 2 tab daily for one day  Then one tab daily for one day    hydrALAZINE (APRESOLINE) 25 mg tablet Take 1 Tablet by mouth three (3) times daily as needed for Other (SBP above 160). levoFLOXacin (LEVAQUIN) 750 mg tablet Take 1 Tablet by mouth every twenty-four (24) hours. metFORMIN (GLUCOPHAGE) 500 mg tablet Take 1 Tablet by mouth two (2) times daily (with meals). levothyroxine (SYNTHROID) 137 mcg tablet Take 137 mcg by mouth every morning. acetaminophen (TYLENOL) 325 mg tablet Take 2 Tablets by mouth every four (4) hours as needed for Pain. atorvastatin (LIPITOR) 20 mg tablet Take 1 Tablet by mouth nightly. famotidine (PEPCID) 20 mg tablet Take 1 Tablet by mouth two (2) times a day. cholecalciferol, vitamin D3, 50 mcg (2,000 unit) tab Take  by mouth every seven (7) days. cyanocobalamin (VITAMIN B12) 500 mcg tablet Take 500 mcg by mouth daily. fluticasone-umeclidinium-vilanterol (TRELEGY ELLIPTA) 100-62.5-25 mcg inhaler Take 1 Puff by inhalation daily. Pt will stop 48 hrs prior to surgery    mv-min-vit C-Glu-Tawana [id number] 250-12.5 mg chew Take 2 Tabs by mouth. No current facility-administered medications for this visit.        Past Surgical History:   Procedure Laterality Date    COLONOSCOPY N/A 5/21/2019    COLONOSCOPY, SCREENING with hot snare polypectomy, w/ Bx performed by Kathryn White MD at [facility] [place] ENDOSCOPY    HX HYSTERECTOMY         Allergies and Sensitivities:  Allergies   Allergen Reactions    Latex Hives    Morphine Anaphylaxis    Adhesive Hives    Augmentin [Amoxicillin-Pot Clavulanate] Angioedema     Facial swelling    Seafood Hives and Nausea Only     Shellfish       Family History:  Family History   Problem Relation Age of Onset    Thyroid Disease Mother     Breast Cancer Mother     Cancer Brother 24        brain    Cancer Maternal Aunt         lung    Breast Problems Maternal Aunt 29    Breast Cancer Maternal Aunt     Cancer Paternal Aunt     Cancer Paternal Uncle     Cancer Maternal Grandmother     Breast Cancer Maternal Grandmother     Cancer Maternal Grandfather     Cancer Other         child leukemia    Heart Attack Other        Social History:  Social History     Tobacco Use    Smoking status: Every Day    Smokeless tobacco: Never    Tobacco comments:     smokes one cigarette day   Vaping Use    Vaping Use: Never used   Substance Use Topics    Alcohol use: No    Drug use: No     She  reports that she has been smoking. She has never used smokeless tobacco.  She  reports no history of alcohol use. Physical Exam:  BP Readings from Last 3 Encounters:   11/11/22 130/88   07/25/21 (!) 152/89   07/13/21 (!) 165/88         Pulse Readings from Last 3 Encounters:   11/11/22 72   09/08/22 80   07/25/21 82          Wt Readings from Last 3 Encounters:   11/11/22 97.5 kg (215 lb)   09/08/22 93 kg (205 lb)   07/25/21 93 kg (205 lb)       Constitutional: Oriented to person, place, and time. HENT: Head: Normocephalic and atraumatic. Eyes: Conjunctivae and extraocular motions are normal.   Neck: No JVD present. Carotid bruit is not appreciated. Cardiovascular: Regular rhythm. No murmur, gallop or rubs appreciated  Lung: Breath sounds normal. No respiratory distress. No ronchi or rales appreciated  Abdominal: No tenderness. No rebound and no guarding. Musculoskeletal: There is no lower extremity edema. No cynosis  Lymphadenopathy:  No cervical or supraclavicular adenopathy appriciated. Neurological: No gross motor deficit noted. Skin: No visible skin rash noted. No Ear discharge noted  Psychiatric: Normal mood and affect.      LABS:   @  Lab Results   Component Value Date/Time    WBC 13.1 07/25/2021 03:26 AM    HGB 11.2 (L) 2021 03:26 AM    HCT 33.3 (L) 2021 03:26 AM    PLATELET 774  03:26 AM    MCV 95.1 2021 03:26 AM     Lab Results   Component Value Date/Time    Sodium 143 2021 03:26 AM    Potassium 3.6 2021 03:26 AM    Chloride 110 2021 03:26 AM    CO2 27 2021 03:26 AM    Glucose 138 (H) 2021 03:26 AM    BUN 9 2021 03:26 AM    Creatinine 0.54 (L) 2021 03:26 AM     Lipids Latest Ref Rng & Units 2021   Chol, Total <200 MG/   HDL 40 - 60 MG/DL 36(L)   LDL 0 - 100 MG/DL 45   Trig <150 MG/   Chol/HDL Ratio 0 - 5.0   2.9   Some recent data might be hidden     Lab Results   Component Value Date/Time    ALT (SGPT) 35 2021 03:26 AM     Lab Results   Component Value Date/Time    Hemoglobin A1c 6.3 (H) 2021 02:03 AM     Lab Results   Component Value Date/Time    TSH 1.42 2021 02:03 AM     EK2022: Sinus rhythm at 72 bpm.  Normal NM and QRS interval.  No ST changes of ischemia    ECHO ADULT COMPLETE 2021    Interpretation Summary  · LV: Estimated LVEF is 55 - 60%. Visually measured ejection fraction. Normal cavity size and systolic function (ejection fraction normal). Moderate septal wall hypertrophy. Mild posterior wall hypertrophy. Mild (grade 1) left ventricular diastolic dysfunction. STRESS TEST (EST, PHARM, NUC, ECHO etc)    CATHETERIZATION    IMPRESSION & PLAN:  79-year-old female    Hypertension: /88. Currently on amlodipine, hydralazine. Continue same. Echo ordered to rule out hypertensive cardiovascular disease, limited    Hyperlipidemia: Currently on atorvastatin 20 mg daily. Continue same    Exertional dyspnea and chest tightness:  Symptoms are concerning for angina unless proven otherwise  Start aspirin 81 mg daily  We will provide sublingual nitroglycerin for as needed use.   Advised against exertional activities  Proceed with nuclear stress test.  Cardiac cath discussed however she would like to proceed with nuclear stress test first.  Risk, benefit alternative discussed  Echo to rule out abnormal cardiac structure    Palpitation:  According to patient she had a Holter monitor for 1 week there might be some abnormality. I am still awaiting the Holter monitor results from primary care provider. Depending on results, may need to consider using AV taylor blocking agent if there is any tacky arrhythmia. Currently no presyncope syncope    Importance of diet and exercise was discussed with patient. This plan was discussed with patient who is in agreement. Thank you for allowing me to participate in patient care. Please feel free to call me if you have any question or concern. Mauricio Urbano MD  Please note: This document has been produced using voice recognition software. Unrecognized errors in transcription may be present.

## 2022-11-14 NOTE — PROGRESS NOTES
Problem: Risk for Spread of Infection  Goal: Prevent transmission of infectious organism to others  Description: Prevent the transmission of infectious organisms to other patients, staff members, and visitors. Outcome: Progressing Towards Goal     Problem: Patient Education:  Go to Education Activity  Goal: Patient/Family Education  Outcome: Progressing Towards Goal     Problem: Falls - Risk of  Goal: *Absence of Falls  Description: Document Alesia Lopez Fall Risk and appropriate interventions in the flowsheet. Outcome: Progressing Towards Goal  Note: Fall Risk Interventions:                      History of Falls Interventions: Investigate reason for fall         Problem: Patient Education: Go to Patient Education Activity  Goal: Patient/Family Education  Outcome: Progressing Towards Goal     Problem: Airway Clearance - Ineffective  Goal: Achieve or maintain patent airway  Outcome: Progressing Towards Goal     Problem: Gas Exchange - Impaired  Goal: Absence of hypoxia  Outcome: Progressing Towards Goal  Goal: Promote optimal lung function  Outcome: Progressing Towards Goal     Problem: Breathing Pattern - Ineffective  Goal: Ability to achieve and maintain a regular respiratory rate  Outcome: Progressing Towards Goal     Problem:  Body Temperature -  Risk of, Imbalanced  Goal: Ability to maintain a body temperature within defined limits  Outcome: Progressing Towards Goal  Goal: Will regain or maintain usual level of consciousness  Outcome: Progressing Towards Goal  Goal: Complications related to the disease process, condition or treatment will be avoided or minimized  Outcome: Progressing Towards Goal     Problem: Isolation Precautions - Risk of Spread of Infection  Goal: Prevent transmission of infectious organism to others  Outcome: Progressing Towards Goal     Problem: Nutrition Deficits  Goal: Optimize nutrtional status  Outcome: Progressing Towards Goal     Problem: Risk for Fluid Volume Deficit  Goal: Maintain normal heart rhythm  Outcome: Progressing Towards Goal  Goal: Maintain absence of muscle cramping  Outcome: Progressing Towards Goal  Goal: Maintain normal serum potassium, sodium, calcium, phosphorus, and pH  Outcome: Progressing Towards Goal     Problem: Loneliness or Risk for Loneliness  Goal: Demonstrate positive use of time alone when socialization is not possible  Outcome: Progressing Towards Goal     Problem: Fatigue  Goal: Verbalize increase energy and improved vitality  Outcome: Progressing Towards Goal     Problem: Patient Education: Go to Patient Education Activity  Goal: Patient/Family Education  Outcome: Progressing Towards Goal Detail Level: Simple

## 2023-01-31 NOTE — PROGRESS NOTES
Dayan Harding presents today for a 3 month check-up. She is a 60year old female with history of history of hypertension and hyperlipidemia, ovarian cancer, rheumatic fever as a child. She was seen by Dr. Heidi Mathews in Nov. 2022 after being referred to him for an abnormal 7 day event monitor (results were not available at the time of the visit). During that visit, she mentioned that she had been experiencing HAMMOND and some chest pressure off and of for 1-2 years (which she states she did not mention to other providers). An echo and nuclear stress test were ordered. Unfortunately, testing was denied by her insurance. Since her last visit, she has continued to experience chest pain/pressure that \"comes and goes. \"  She had an episode of nocturnal chest pressure/heaviness that radiated across her chest and woke her from sleep. This occurred around Jan. 27, 2023, after she returned from visiting her home [facility] [place], [facility]). She did meditation and deep breathing and it settled down. She did not seek medical attention. She reports that her insurance company has since sent her a letter stating that they would approved the testing if they are completed within certain dates. Admits to chest pain, tightness, heaviness (as described above), and denies palpitations. Denies shortness of breath at rest, admits to some dyspnea on exertion, denies orthopnea and PND. Denies abdominal bloating. Denies lightheadedness, dizziness, and syncope. Admits to occasional mild, lower extremity edema and denies claudication. Denies nausea, vomiting, diarrhea, melena, hematochezia. Denies hematuria, urgency, frequency. Denies fever, chills.         PMH:  Past Medical History:   Diagnosis Date    HLD (hyperlipidemia)     HTN (hypertension)     Ovarian cancer ([facility] [address]     Rheumatic fever     Thyroid disease        PSH:  Past Surgical History:   Procedure Laterality Date    COLONOSCOPY N/A 5/21/2019    COLONOSCOPY, SCREENING with hot snare polypectomy, w/ Bx performed by Mimi Isaac MD at [address]  Current Outpatient Medications   Medication Sig    aspirin 81 mg chewable tablet Take 1 Tablet by mouth daily. nitroglycerin (NITROSTAT) 0.4 mg SL tablet 1 Tablet by SubLINGual route every five (5) minutes as needed for Chest Pain. Up to 3 doses. amLODIPine (NORVASC) 5 mg tablet Take 1 Tablet by mouth daily. levoFLOXacin (LEVAQUIN) 750 mg tablet Take 1 Tablet by mouth every twenty-four (24) hours. metFORMIN (GLUCOPHAGE) 500 mg tablet Take 1 Tablet by mouth two (2) times daily (with meals). levothyroxine (SYNTHROID) 137 mcg tablet Take 137 mcg by mouth every morning. acetaminophen (TYLENOL) 325 mg tablet Take 2 Tablets by mouth every four (4) hours as needed for Pain. atorvastatin (LIPITOR) 20 mg tablet Take 1 Tablet by mouth nightly. famotidine (PEPCID) 20 mg tablet Take 1 Tablet by mouth two (2) times a day. cholecalciferol, vitamin D3, 50 mcg (2,000 unit) tab Take  by mouth every seven (7) days. cyanocobalamin (VITAMIN B12) 500 mcg tablet Take 500 mcg by mouth daily. fluticasone-umeclidinium-vilanterol (TRELEGY ELLIPTA) 100-62.5-25 mcg inhaler Take 1 Puff by inhalation daily. Pt will stop 48 hrs prior to surgery    mv-min-vit C-Glu-Tawana [id number] 250-12.5 mg chew Take 2 Tabs by mouth. No current facility-administered medications for this visit.        Allergies and Sensitivities:  Allergies   Allergen Reactions    Latex Hives    Morphine Anaphylaxis    Adhesive Hives    Augmentin [Amoxicillin-Pot Clavulanate] Angioedema     Facial swelling    Seafood Hives and Nausea Only     Shellfish       Family History:  Family History   Problem Relation Age of Onset    Thyroid Disease Mother     Breast Cancer Mother     Cancer Brother 21        brain    Cancer Maternal Aunt         lung    Breast Problems Maternal Aunt 29    Breast Cancer Maternal Aunt     Cancer Paternal Aunt Cancer Paternal Uncle     Cancer Maternal Grandmother     Breast Cancer Maternal Grandmother     Cancer Maternal Grandfather     Cancer Other         child leukemia    Heart Attack Other        Social History:  She  reports that she has been smoking. She has never used smokeless tobacco.  She  reports no history of alcohol use. Physical:  Visit Vitals  BP (!) 150/100 (BP 1 Location: Left upper arm)   Pulse 65   Ht 5' 3\" (1.6 m)   Wt 100.7 kg (222 lb)   SpO2 98%   BMI 39.33 kg/m²     Her weight is up 7 pounds since her last visit      Exam:  Neck:  Supple, no JVD, no carotid bruits  CV:  Normal S1 and  S2, no murmurs, rubs, or gallops noted  Lungs:  Clear to ausculation throughout, no wheezes or rales  Abd:  Soft, non-tender, obese, non-distended with good bowel sounds. No hepatosplenomegaly  Extremities:  trace lower extremity edema      Data:  EKG:      LABS:  Lab Results   Component Value Date/Time    Sodium 143 07/25/2021 03:26 AM    Potassium 3.6 07/25/2021 03:26 AM    Chloride 110 07/25/2021 03:26 AM    CO2 27 07/25/2021 03:26 AM    Glucose 138 (H) 07/25/2021 03:26 AM    BUN 9 07/25/2021 03:26 AM    Creatinine 0.54 (L) 07/25/2021 03:26 AM     Lab Results   Component Value Date/Time    Cholesterol, total 104 07/12/2021 04:47 AM    HDL Cholesterol 36 (L) 07/12/2021 04:47 AM    LDL, calculated 45 07/12/2021 04:47 AM    Triglyceride 115 07/12/2021 04:47 AM    CHOL/HDL Ratio 2.9 07/12/2021 04:47 AM     Lab Results   Component Value Date/Time    ALT (SGPT) 35 07/25/2021 03:26 AM         Impression/Plan:  Hypertension, blood pressure elevated and suboptimally controlled  Hyperlipidemia, on atorvastatin 20mg  History of rheumatic fever as a child  History of ovarian cancer  Chest pain/pressure/heaviness  HAMMOND  Dizziness  Fatigue    Ms. Peter was seen today for a 3 month check-up. She continues to complain of chest pressure/tightness/heaviness that occurs at any time.   She has had 2 episodes of nocturnal chest pressure that has awakened her from sleep. She also complains of fatigue, occasional HAMMOND, occasional dizziness. A nuclear stress test and echo were ordered a few months ago and her insurance company denied it. Since then, she states that she received a letter stating that they will approve the testing if done within certain time constraints. We will get the stress test ordered to be done this week and the echo will be scheduled for a later date. Her EKG does not show any signs of ischemia. We did not receive a copy of her 24 hour Holter results (from her PCP's office). I asked that they fax us a copy for Dr. Lesli Ruano's review. When seen by Dr. Yumiko Hamlin in November 2022, she was referred to him because of the abnormal Holter results but he did not have the results to review that day either. Her blood pressure is elevated and suboptimally controlled. She will be started on olmesartan 20mg daily. All other medications to remain the same. She is scheduled to see her PCP tomorrow. In view of her diabetes, she would benefit from the use of an ARB as well. Importance of optimal blood pressure control discussed. Time:  35 minutes      She will follow-up with Dr. Ada Hillman as scheduled and as needed. Jovany Escalante MSN, FNP-BC    Please note:  Portions of this chart were created with Dragon medical speech to text program.  Unrecognized errors may be present.

## 2023-02-06 ENCOUNTER — OFFICE VISIT (OUTPATIENT)
Dept: CARDIOLOGY CLINIC | Age: 63
End: 2023-02-06
Payer: MEDICAID

## 2023-02-06 VITALS
HEART RATE: 65 BPM | WEIGHT: 222 LBS | OXYGEN SATURATION: 98 % | DIASTOLIC BLOOD PRESSURE: 100 MMHG | HEIGHT: 63 IN | BODY MASS INDEX: 39.34 KG/M2 | SYSTOLIC BLOOD PRESSURE: 150 MMHG

## 2023-02-06 DIAGNOSIS — R07.9 CHEST PAIN, UNSPECIFIED TYPE: ICD-10-CM

## 2023-02-06 DIAGNOSIS — R42 DIZZINESS: ICD-10-CM

## 2023-02-06 DIAGNOSIS — I10 HYPERTENSION, UNSPECIFIED TYPE: Primary | ICD-10-CM

## 2023-02-06 DIAGNOSIS — R06.09 DOE (DYSPNEA ON EXERTION): ICD-10-CM

## 2023-02-06 DIAGNOSIS — R53.83 FATIGUE, UNSPECIFIED TYPE: ICD-10-CM

## 2023-02-06 DIAGNOSIS — R06.02 SHORTNESS OF BREATH: ICD-10-CM

## 2023-02-06 DIAGNOSIS — R07.9 CHEST PAIN, UNSPECIFIED TYPE: Primary | ICD-10-CM

## 2023-02-06 DIAGNOSIS — R06.00 DYSPNEA, UNSPECIFIED TYPE: ICD-10-CM

## 2023-02-06 PROCEDURE — 99214 OFFICE O/P EST MOD 30 MIN: CPT | Performed by: NURSE PRACTITIONER

## 2023-02-06 PROCEDURE — 3077F SYST BP >= 140 MM HG: CPT | Performed by: NURSE PRACTITIONER

## 2023-02-06 PROCEDURE — 3080F DIAST BP >= 90 MM HG: CPT | Performed by: NURSE PRACTITIONER

## 2023-02-06 RX ORDER — OLMESARTAN MEDOXOMIL 20 MG/1
20 TABLET ORAL DAILY
Qty: 30 TABLET | Refills: 6 | Status: SHIPPED | OUTPATIENT
Start: 2023-02-06

## 2023-02-06 NOTE — PATIENT INSTRUCTIONS
Pharmacologic nuclear stress test: Dx:  CP, SOB, fatigue, dizziness  Echocardiogram; Dx:  CP, SOB, fatigue, dizziness  Begin olmesartan 20mg once a day  All other medications to remain the same  Follow-up with Dr Shiv Jiménez as scheduled and as needed  Please fax us a copy of the Holter monitor results; fax #[phone]

## 2023-02-06 NOTE — PROGRESS NOTES
Dayan Peter presents today for   Chief Complaint   Patient presents with    Follow-up     3 month follow up. Chest Pain     Center chest pain pressure that comes and goes. Shortness of Breath     SOB while laying down that comes and goes. Palpitations     Racing palpitations that comes and goes. Leg Swelling     Lt ankle swelling that comes and goes. Dizziness     Dizzy spells on/off. Dayan Peter preferred language for health care discussion is english/other. Is someone accompanying this pt? no    Is the patient using any DME equipment during [address]? no    Depression Screening:  3 most recent PHQ Screens 2/6/2023   Little interest or pleasure in doing things Not at all   Feeling down, depressed, irritable, or hopeless Not at all   Total Score PHQ 2 0       Learning Assessment:  Learning Assessment 2/6/2023   PRIMARY LEARNER Patient   HIGHEST LEVEL OF EDUCATION - PRIMARY LEARNER  -   BARRIERS PRIMARY LEARNER -   [address]    NEED -   LEARNER PREFERENCE PRIMARY DEMONSTRATION     -   [address] -   ANSWERED BY patient   RELATIONSHIP SELF   ASSESSMENT COMMENT -       Abuse Screening:  Abuse Screening Questionnaire 2/6/2023   Do you ever feel afraid of your partner? N   Are you in a relationship with someone who physically or mentally threatens you? N   Is it safe for you to go home? Y       Fall Risk  No flowsheet data found. Pt currently taking Anticoagulant therapy? no    Pt currently taking Antiplatelet therapy ? ASA 81 mg 1x daily       Coordination of Care:  1. Have you been to the ER, urgent care clinic since your last visit? Hospitalized since your last visit? no    2. Have you seen or consulted any other health care providers outside of the [address] [place] [address] since your last visit? Include any pap smears or colon screening.  no

## 2023-02-10 ENCOUNTER — TELEPHONE (OUTPATIENT)
Dept: CARDIOLOGY CLINIC | Age: 63
End: 2023-02-10

## 2023-02-10 NOTE — TELEPHONE ENCOUNTER
I called to discuss her recent stress test results with her. No answer. I left a message informing her that the stress test was negative. I asked that she call the office if she had any questions.

## 2023-08-07 ENCOUNTER — OFFICE VISIT (OUTPATIENT)
Age: 63
End: 2023-08-07
Payer: MEDICAID

## 2023-08-07 VITALS
SYSTOLIC BLOOD PRESSURE: 124 MMHG | WEIGHT: 213 LBS | DIASTOLIC BLOOD PRESSURE: 74 MMHG | TEMPERATURE: 97.7 F | HEIGHT: 63 IN | HEART RATE: 68 BPM | OXYGEN SATURATION: 97 % | BODY MASS INDEX: 37.74 KG/M2

## 2023-08-07 DIAGNOSIS — K81.1 CHRONIC CHOLECYSTITIS: Primary | ICD-10-CM

## 2023-08-07 PROCEDURE — 99214 OFFICE O/P EST MOD 30 MIN: CPT | Performed by: SURGERY

## 2023-08-07 PROCEDURE — 3074F SYST BP LT 130 MM HG: CPT | Performed by: SURGERY

## 2023-08-07 PROCEDURE — 3078F DIAST BP <80 MM HG: CPT | Performed by: SURGERY

## 2023-08-07 NOTE — PROGRESS NOTES
No Singer is a 58 y.o. female (: 1960) presenting to address:    Chief Complaint   Patient presents with    New Patient     Cholelithiasis/referred by Dr. Phil Power       Medication list and allergies have been reviewed with No Infante and updated as of today's date. I have gone over all Medical, Surgical and Social History with No Infante and updated/added the information accordingly.

## 2023-08-07 NOTE — PROGRESS NOTES
General Surgery Consult      No Infante  Admit date: (Not on file)    MRN: [id number]     : 1960     Age: 58 y.o. Attending Physician: Tre Franco MD, FACS      Subjective:     April is a 58 y.o. female who was referred to me by Dr. Anabel Larson for evaluation of a picture of chronic cholecystitis. The patient stated that she has been having nonspecific right upper quadrant discomfort that is usually happening after mainly drinking milkshake. She is a  and she did stated that the pain is not severe and not affecting her daily activity but clearly she has very high tolerance for pain. She has had no nausea and no vomiting. The patient  has not had jaundice, acholic stools or dark urine and has not had a history of pancreatitis or hepatitis.   She has a history of  and hysterectomy for cervical cancer in the past    Patient Active Problem List    Diagnosis Date Noted    COVID-19 2021    Respiratory failure with hypoxia ([address]) 2021    Chest pain 2017    Degenerative lumbar spinal stenosis 2014    Spinal stenosis of lumbar region with radiculopathy 2014    Hypothyroid 02/10/2014    GERD (gastroesophageal reflux disease) 02/10/2014    Hypertension 02/10/2014     Past Medical History:   Diagnosis Date    HLD (hyperlipidemia)     HTN (hypertension)     Ovarian cancer ([address])     Rheumatic fever     Thyroid disease       Past Surgical History:   Procedure Laterality Date    COLONOSCOPY N/A 2019    COLONOSCOPY, SCREENING with hot snare polypectomy, w/ Bx performed by Lora Barragan MD at [facility] (CERVIX STATUS UNKNOWN)        Social History     Tobacco Use    Smoking status: Every Day     Packs/day: 0.25     Types: Cigarettes    Smokeless tobacco: Never   Substance Use Topics    Alcohol use: No      Social History     Tobacco Use   Smoking Status Every Day    Packs/day: 0.25    Types: Cigarettes   Smokeless Tobacco Never

## 2023-08-08 RX ORDER — OLMESARTAN MEDOXOMIL 20 MG/1
20 TABLET ORAL DAILY
COMMUNITY
Start: 2023-07-17

## 2023-08-08 RX ORDER — BISACODYL 5 MG/1
TABLET, DELAYED RELEASE ORAL PRN
COMMUNITY
Start: 2019-04-12

## 2023-08-08 RX ORDER — LEVOTHYROXINE SODIUM 0.03 MG/1
25 TABLET ORAL EVERY OTHER DAY
COMMUNITY

## 2023-08-08 RX ORDER — CETIRIZINE HYDROCHLORIDE 10 MG/1
10 TABLET ORAL DAILY
COMMUNITY
Start: 2023-07-17

## 2023-08-08 NOTE — PERIOP NOTE
Instructions for your surgery at [address] [place] [address] Date:  8/8/2023      Patient's Name:  No Infante           Surgery Date:  8/18/2023              Please enter the main entrance of the hospital and check-in at the  located in the lobby. Once checked in at the , you will take the elevators to the second floor, and report to the waiting room on the left. The room will say Procedure Registration. Do NOT eat or drink anything, including candy, gum, or ice chips after midnight prior to your surgery, unless you have specific instructions from your surgeon or anesthesia provider to do so. Brush your teeth before coming to the hospital. You may swish with water, but do not swallow. No smoking/Vaping/E-Cigarettes 24 hours prior to the day of surgery. No alcohol 24 hours prior to the day of surgery. No recreational drugs for one week prior to the day of surgery. Bring Photo ID, Insurance information, and Co-pay if required on day of surgery. Bring in pertinent legal documents, such as, Medical Power of FLORENCIA CAROL, DNR, Advance Directive, etc.  Leave all valuables, including money/purse, at home. Remove all jewelry, including ALL body piercings, nail polish, acrylic nails, and makeup (including mascara); no lotions, powders, deodorant, or perfume/cologne/after shave on the skin. Follow instruction for Hibiclens washes and CHG wipes from surgeon's office. Glasses and dentures may be worn to the hospital. They must be removed prior to surgery. Please bring case/container for glasses or dentures. Contact lenses should not be worn on day of surgery. Call your doctor's office if symptoms of a cold or illness develop within 24-48 hours prior to your surgery. 14. AN ADULT (relative or friend 25 years or older) [address].   15. Please make arrangements for a responsible adult (18 years or older) to be with you for 24 hours after your

## 2023-08-17 ENCOUNTER — ANESTHESIA EVENT (OUTPATIENT)
Facility: HOSPITAL | Age: 63
End: 2023-08-17
Payer: MEDICAID

## 2023-08-18 ENCOUNTER — ANESTHESIA (OUTPATIENT)
Facility: HOSPITAL | Age: 63
End: 2023-08-18
Payer: MEDICAID

## 2023-08-18 ENCOUNTER — HOSPITAL ENCOUNTER (OUTPATIENT)
Facility: HOSPITAL | Age: 63
Setting detail: OUTPATIENT SURGERY
Discharge: HOME | End: 2023-08-18
Attending: SURGERY | Admitting: SURGERY
Payer: MEDICAID

## 2023-08-18 VITALS
WEIGHT: 213.6 LBS | TEMPERATURE: 97.5 F | RESPIRATION RATE: 15 BRPM | BODY MASS INDEX: 37.85 KG/M2 | SYSTOLIC BLOOD PRESSURE: 138 MMHG | HEIGHT: 63 IN | DIASTOLIC BLOOD PRESSURE: 83 MMHG | OXYGEN SATURATION: 95 % | HEART RATE: 54 BPM

## 2023-08-18 DIAGNOSIS — Z90.49 S/P CHOLECYSTECTOMY: Primary | ICD-10-CM

## 2023-08-18 LAB
ANION GAP SERPL CALC-SCNC: 4 MMOL/L (ref 3–18)
BUN SERPL-MCNC: 18 MG/DL (ref 7–18)
BUN/CREAT SERPL: 35 (ref 12–20)
CALCIUM SERPL-MCNC: 9.1 MG/DL (ref 8.5–10.1)
CHLORIDE SERPL-SCNC: 114 MMOL/L (ref 100–111)
CO2 SERPL-SCNC: 24 MMOL/L (ref 21–32)
CREAT SERPL-MCNC: 0.52 MG/DL (ref 0.6–1.3)
EKG ATRIAL RATE: 58 BPM
EKG DIAGNOSIS: NORMAL
EKG P AXIS: 26 DEGREES
EKG P-R INTERVAL: 184 MS
EKG Q-T INTERVAL: 464 MS
EKG QRS DURATION: 78 MS
EKG QTC CALCULATION (BAZETT): 455 MS
EKG R AXIS: 18 DEGREES
EKG T AXIS: 20 DEGREES
EKG VENTRICULAR RATE: 58 BPM
ERYTHROCYTE [DISTWIDTH] IN BLOOD BY AUTOMATED COUNT: 13 % (ref 11.6–14.5)
GLUCOSE SERPL-MCNC: 116 MG/DL (ref 74–99)
HCT VFR BLD AUTO: 37.9 % (ref 35–45)
HGB BLD-MCNC: 13.1 G/DL (ref 12–16)
MCH RBC QN AUTO: 33.6 PG (ref 24–34)
MCHC RBC AUTO-ENTMCNC: 34.6 G/DL (ref 31–37)
MCV RBC AUTO: 97.2 FL (ref 78–100)
NRBC # BLD: 0 K/UL (ref 0–0.01)
NRBC BLD-RTO: 0 PER 100 WBC
PLATELET # BLD AUTO: 139 K/UL (ref 135–420)
PMV BLD AUTO: 12.9 FL (ref 9.2–11.8)
POTASSIUM SERPL-SCNC: 3.8 MMOL/L (ref 3.5–5.5)
RBC # BLD AUTO: 3.9 M/UL (ref 4.2–5.3)
SODIUM SERPL-SCNC: 142 MMOL/L (ref 136–145)
WBC # BLD AUTO: 7.2 K/UL (ref 4.6–13.2)

## 2023-08-18 PROCEDURE — 88304 TISSUE EXAM BY PATHOLOGIST: CPT

## 2023-08-18 PROCEDURE — 47562 LAPAROSCOPIC CHOLECYSTECTOMY: CPT | Performed by: SURGERY

## 2023-08-18 PROCEDURE — 6360000002 HC RX W HCPCS: Performed by: NURSE ANESTHETIST, CERTIFIED REGISTERED

## 2023-08-18 PROCEDURE — 2580000003 HC RX 258: Performed by: SURGERY

## 2023-08-18 PROCEDURE — 2580000003 HC RX 258: Performed by: NURSE ANESTHETIST, CERTIFIED REGISTERED

## 2023-08-18 PROCEDURE — 2500000003 HC RX 250 WO HCPCS: Performed by: SURGERY

## 2023-08-18 PROCEDURE — 7100000000 HC PACU RECOVERY - FIRST 15 MIN: Performed by: SURGERY

## 2023-08-18 PROCEDURE — 93005 ELECTROCARDIOGRAM TRACING: CPT | Performed by: NURSE ANESTHETIST, CERTIFIED REGISTERED

## 2023-08-18 PROCEDURE — 2709999900 HC NON-CHARGEABLE SUPPLY: Performed by: SURGERY

## 2023-08-18 PROCEDURE — 93010 ELECTROCARDIOGRAM REPORT: CPT | Performed by: INTERNAL MEDICINE

## 2023-08-18 PROCEDURE — S2900 ROBOTIC SURGICAL SYSTEM: HCPCS | Performed by: SURGERY

## 2023-08-18 PROCEDURE — 3600000009 HC SURGERY ROBOT BASE: Performed by: SURGERY

## 2023-08-18 PROCEDURE — C1889 IMPLANT/INSERT DEVICE, NOC: HCPCS | Performed by: SURGERY

## 2023-08-18 PROCEDURE — 85027 COMPLETE CBC AUTOMATED: CPT

## 2023-08-18 PROCEDURE — C9399 UNCLASSIFIED DRUGS OR BIOLOG: HCPCS | Performed by: NURSE ANESTHETIST, CERTIFIED REGISTERED

## 2023-08-18 PROCEDURE — 2500000003 HC RX 250 WO HCPCS: Performed by: NURSE ANESTHETIST, CERTIFIED REGISTERED

## 2023-08-18 PROCEDURE — 80048 BASIC METABOLIC PNL TOTAL CA: CPT

## 2023-08-18 PROCEDURE — 6360000002 HC RX W HCPCS: Performed by: SURGERY

## 2023-08-18 PROCEDURE — 3700000000 HC ANESTHESIA ATTENDED CARE: Performed by: SURGERY

## 2023-08-18 PROCEDURE — 3700000001 HC ADD 15 MINUTES (ANESTHESIA): Performed by: SURGERY

## 2023-08-18 PROCEDURE — A4217 STERILE WATER/SALINE, 500 ML: HCPCS | Performed by: SURGERY

## 2023-08-18 PROCEDURE — A4216 STERILE WATER/SALINE, 10 ML: HCPCS | Performed by: NURSE ANESTHETIST, CERTIFIED REGISTERED

## 2023-08-18 PROCEDURE — 7100000011 HC PHASE II RECOVERY - ADDTL 15 MIN: Performed by: SURGERY

## 2023-08-18 PROCEDURE — 3600000019 HC SURGERY ROBOT ADDTL 15MIN: Performed by: SURGERY

## 2023-08-18 PROCEDURE — 7100000010 HC PHASE II RECOVERY - FIRST 15 MIN: Performed by: SURGERY

## 2023-08-18 PROCEDURE — 6370000000 HC RX 637 (ALT 250 FOR IP): Performed by: SURGERY

## 2023-08-18 PROCEDURE — 7100000001 HC PACU RECOVERY - ADDTL 15 MIN: Performed by: SURGERY

## 2023-08-18 DEVICE — CLIP INT M L POLYMER LOK LIG HEM O LOK: Type: IMPLANTABLE DEVICE | Status: FUNCTIONAL

## 2023-08-18 RX ORDER — SODIUM CHLORIDE 0.9 % (FLUSH) 0.9 %
5-40 SYRINGE (ML) INJECTION PRN
Status: DISCONTINUED | OUTPATIENT
Start: 2023-08-18 | End: 2023-08-18 | Stop reason: HOSPADM

## 2023-08-18 RX ORDER — OXYCODONE HYDROCHLORIDE AND ACETAMINOPHEN 5; 325 MG/1; MG/1
1 TABLET ORAL
Status: COMPLETED | OUTPATIENT
Start: 2023-08-18 | End: 2023-08-18

## 2023-08-18 RX ORDER — MIDAZOLAM HYDROCHLORIDE 1 MG/ML
INJECTION INTRAMUSCULAR; INTRAVENOUS PRN
Status: DISCONTINUED | OUTPATIENT
Start: 2023-08-18 | End: 2023-08-18 | Stop reason: SDUPTHER

## 2023-08-18 RX ORDER — SODIUM CHLORIDE, SODIUM LACTATE, POTASSIUM CHLORIDE, CALCIUM CHLORIDE 600; 310; 30; 20 MG/100ML; MG/100ML; MG/100ML; MG/100ML
INJECTION, SOLUTION INTRAVENOUS CONTINUOUS
Status: DISCONTINUED | OUTPATIENT
Start: 2023-08-18 | End: 2023-08-18 | Stop reason: HOSPADM

## 2023-08-18 RX ORDER — LIDOCAINE HYDROCHLORIDE 10 MG/ML
1 INJECTION, SOLUTION EPIDURAL; INFILTRATION; INTRACAUDAL; PERINEURAL
Status: DISCONTINUED | OUTPATIENT
Start: 2023-08-18 | End: 2023-08-18 | Stop reason: HOSPADM

## 2023-08-18 RX ORDER — INDOCYANINE GREEN AND WATER 25 MG
2.5 KIT INJECTION ONCE
Status: COMPLETED | OUTPATIENT
Start: 2023-08-18 | End: 2023-08-18

## 2023-08-18 RX ORDER — ONDANSETRON 2 MG/ML
INJECTION INTRAMUSCULAR; INTRAVENOUS PRN
Status: DISCONTINUED | OUTPATIENT
Start: 2023-08-18 | End: 2023-08-18 | Stop reason: SDUPTHER

## 2023-08-18 RX ORDER — ONDANSETRON 2 MG/ML
4 INJECTION INTRAMUSCULAR; INTRAVENOUS
Status: DISCONTINUED | OUTPATIENT
Start: 2023-08-18 | End: 2023-08-18 | Stop reason: HOSPADM

## 2023-08-18 RX ORDER — SODIUM CHLORIDE 9 MG/ML
INJECTION, SOLUTION INTRAVENOUS PRN
Status: DISCONTINUED | OUTPATIENT
Start: 2023-08-18 | End: 2023-08-18 | Stop reason: HOSPADM

## 2023-08-18 RX ORDER — DEXAMETHASONE SODIUM PHOSPHATE 4 MG/ML
INJECTION, SOLUTION INTRA-ARTICULAR; INTRALESIONAL; INTRAMUSCULAR; INTRAVENOUS; SOFT TISSUE PRN
Status: DISCONTINUED | OUTPATIENT
Start: 2023-08-18 | End: 2023-08-18 | Stop reason: SDUPTHER

## 2023-08-18 RX ORDER — FENTANYL CITRATE 50 UG/ML
INJECTION, SOLUTION INTRAMUSCULAR; INTRAVENOUS PRN
Status: DISCONTINUED | OUTPATIENT
Start: 2023-08-18 | End: 2023-08-18 | Stop reason: SDUPTHER

## 2023-08-18 RX ORDER — OXYCODONE HYDROCHLORIDE AND ACETAMINOPHEN 5; 325 MG/1; MG/1
1 TABLET ORAL EVERY 6 HOURS PRN
Qty: 12 TABLET | Refills: 0 | Status: SHIPPED | OUTPATIENT
Start: 2023-08-18 | End: 2023-08-21

## 2023-08-18 RX ORDER — SODIUM CHLORIDE 0.9 % (FLUSH) 0.9 %
5-40 SYRINGE (ML) INJECTION EVERY 12 HOURS SCHEDULED
Status: DISCONTINUED | OUTPATIENT
Start: 2023-08-18 | End: 2023-08-18 | Stop reason: HOSPADM

## 2023-08-18 RX ADMIN — SODIUM CHLORIDE, POTASSIUM CHLORIDE, SODIUM LACTATE AND CALCIUM CHLORIDE: 600; 310; 30; 20 INJECTION, SOLUTION INTRAVENOUS at 07:01

## 2023-08-18 RX ADMIN — SUGAMMADEX 200 MG: 100 INJECTION, SOLUTION INTRAVENOUS at 07:57

## 2023-08-18 RX ADMIN — ONDANSETRON 4 MG: 2 INJECTION INTRAMUSCULAR; INTRAVENOUS at 07:53

## 2023-08-18 RX ADMIN — WATER 2000 MG: 1 INJECTION, SOLUTION INTRAMUSCULAR; INTRAVENOUS; SUBCUTANEOUS at 07:29

## 2023-08-18 RX ADMIN — FAMOTIDINE 20 MG: 10 INJECTION, SOLUTION INTRAVENOUS at 07:02

## 2023-08-18 RX ADMIN — DEXAMETHASONE SODIUM PHOSPHATE 4 MG: 4 INJECTION, SOLUTION INTRAMUSCULAR; INTRAVENOUS at 07:53

## 2023-08-18 RX ADMIN — FENTANYL CITRATE 100 MCG: 50 INJECTION INTRAMUSCULAR; INTRAVENOUS at 07:29

## 2023-08-18 RX ADMIN — HYDROMORPHONE HYDROCHLORIDE 0.5 MG: 1 INJECTION, SOLUTION INTRAMUSCULAR; INTRAVENOUS; SUBCUTANEOUS at 08:42

## 2023-08-18 RX ADMIN — OXYCODONE HYDROCHLORIDE AND ACETAMINOPHEN 1 TABLET: 5; 325 TABLET ORAL at 10:02

## 2023-08-18 RX ADMIN — INDOCYANINE GREEN AND WATER 2.5 MG: KIT at 07:06

## 2023-08-18 RX ADMIN — MIDAZOLAM 2 MG: 1 INJECTION, SOLUTION INTRAMUSCULAR; INTRAVENOUS at 07:29

## 2023-08-18 ASSESSMENT — PAIN - FUNCTIONAL ASSESSMENT
PAIN_FUNCTIONAL_ASSESSMENT: ACTIVITIES ARE NOT PREVENTED
PAIN_FUNCTIONAL_ASSESSMENT: ACTIVITIES ARE NOT PREVENTED
PAIN_FUNCTIONAL_ASSESSMENT: 0-10

## 2023-08-18 ASSESSMENT — PAIN SCALES - GENERAL
PAINLEVEL_OUTOF10: 4
PAINLEVEL_OUTOF10: 7
PAINLEVEL_OUTOF10: 5
PAINLEVEL_OUTOF10: 6

## 2023-08-18 ASSESSMENT — PAIN DESCRIPTION - ORIENTATION
ORIENTATION: ANTERIOR
ORIENTATION: ANTERIOR

## 2023-08-18 ASSESSMENT — PAIN DESCRIPTION - DESCRIPTORS
DESCRIPTORS: ACHING;SORE
DESCRIPTORS: DULL;PRESSURE
DESCRIPTORS: ACHING;SORE
DESCRIPTORS: PRESSURE;SHARP

## 2023-08-18 ASSESSMENT — PAIN DESCRIPTION - LOCATION
LOCATION: ABDOMEN

## 2023-08-18 ASSESSMENT — PAIN DESCRIPTION - PAIN TYPE: TYPE: SURGICAL PAIN

## 2023-08-18 ASSESSMENT — LIFESTYLE VARIABLES: SMOKING_STATUS: 1

## 2023-08-18 NOTE — BRIEF OP NOTE
Brief Postoperative Note      Patient: No Infante  YOB: 1960  MRN: [id number]    Date of Procedure: 8/18/2023    Pre-Op Diagnosis Codes:     * Chronic cholecystitis [K81.1]    Post-Op Diagnosis: Same       Procedure(s):  ROBOTIC CHOLECYSTECTOMY    Surgeon(s): Gurwinder Gomez MD    Assistant:  Surgical Assistant: Amara Florence    Anesthesia: General    Estimated Blood Loss (mL): Minimal    Complications: None    Specimens:   ID Type Source Tests Collected by Time Destination   A : GALLBLADDER Tissue Gallbladder SURGICAL PATHOLOGY Gurwinder Gomez MD 8/18/2023 2752        Implants:  Implant Name Type Inv. Item Serial No.  Lot No. LRB No. Used Action   CLIP INT M L POLYMER SEMAJ LIG HEM O SEMAJ - [id number]  CLIP INT M L POLYMER SEMAJ LIG [facility]  N/A 1 Implanted         Drains: * No LDAs found *    Findings: Chronic cholecystitis.        Electronically signed by Gurwinder Gomez MD on 8/18/2023 at 8:11 AM

## 2023-08-18 NOTE — OP NOTE
[facility]  OPERATIVE REPORT    Name:  Ruben Stuart  MR#:   [id number]  :  1960  ACCOUNT #:  [de-identified]  DATE OF SERVICE:  2023    PREOPERATIVE DIAGNOSIS:  Chronic cholecystitis. POSTOPERATIVE DIAGNOSIS:  Chronic cholecystitis. PROCEDURE PERFORMED:  Laparoscopic cholecystectomy. SURGEON:  Jovita Matamoros MD    ASSISTANT:  ***    ANESTHESIA:  General.    COMPLICATIONS:  None. SPECIMENS REMOVED:  Gallbladder. IMPLANTS:  None. ESTIMATED BLOOD LOSS:  Minimal.    DETAILS OF PROCEDURE:  The patient was brought to operating room. Anesthesia was induced. Scrubbing and draping of the abdomen were done in the usual manner. A time-out was performed. A skin incision in the left upper quadrant was performed. Veress needle was inserted. Saline drop test was performed. Abdomen was insufflated. 12-mm port was inserted in the supraumbilical area. The abdomen was explored. There was no injury from the Veress needle or port placement. Under direct visualization, TAP block was performed bilaterally and two 8-mm ports were placed on the right side and one on the left side, and the patient was placed in reverse Trendelenburg position. The robot was docked. The gallbladder was identified. It was retracted superiorly. There was some omental adhesion to the gallbladder and there was what seems to be a stone at the neck of the gallbladder which we were able to gently milk it up to be able to identify the Calot triangle. At this point, the adhesions to the gallbladder and the omentum were taken with hook cautery all the way until we reached the Calot triangle. The critical view was seen by dissecting the cystic duct and cystic artery and seeing the cystic duct going into the gallbladder. At this point, we also used indocyanine green to see the junction of the cystic duct with the gallbladder as well as with the common bile duct.   At this point, the cystic duct was clipped

## 2023-08-18 NOTE — PROGRESS NOTES
conducted a pre-op visit with No Infante, who is a 58 y.o.,female. The  provided the following Interventions:  Initiated a relationship of care and support. Offered prayer and assurance of continued prayers on patient's behalf. Plan:  Chaplains will continue to follow and will provide pastoral care on an as needed/requested basis.  recommends bedside caregivers page  on duty if patient shows signs of acute spiritual or emotional distress.       [facility]   [phone]

## 2023-08-18 NOTE — DISCHARGE INSTRUCTIONS
Discharge Instructions Following Surgery    Patient: No Infante MRN: [id number]  SSN: [id number]    YOB: 1960  Age: 58 y.o. Sex: female      Activity  As tolerated, walking encourage, stairs are okay. Avoid strenuous activities - no lifting anything heavier than 15 pounds till seen in the clinic. You may shower at home after 24 hours. For the first 24 hours: do not Drive, Drink alcoholic beverages, or make important decisions. Be aware of dizziness following anesthesia and while taking pain medication. Diet and Medications  Regular diet after nausea from the anesthetic has passed. Take pain medication as directed by your doctor. Call your doctor if pain is NOT relieved by medication. Wound and Dressing Care  There is glue on the wounds. No need for any dressing care. Apply ice packs to the area of the surgery for the first 1 to 2 days  Apply warm compresses after 2 days for pain relieve if needed    Call your doctor if  Excessive bleeding that does not stop after holding pressure over the area. Temperature of 101 degrees F or above. Redness,excessive swelling or bruising, and/or green or yellow, smelly discharge from incision. If nausea and vomiting continues. Follow-Up    Call the office of Dr. Mervin Monroy at [phone] to make your follow-up appointment. Dr. Umu Del Rio cell phone number is [phone]. Please call me if you have any concerns or questions.

## 2023-08-18 NOTE — ANESTHESIA PRE PROCEDURE
07/25/2021 03:26 AM     07/25/2021 03:26 AM       CMP:   Lab Results   Component Value Date/Time     07/25/2021 03:26 AM    K 3.6 07/25/2021 03:26 AM     07/25/2021 03:26 AM    CO2 27 07/25/2021 03:26 AM    BUN 9 07/25/2021 03:26 AM    CREATININE 0.54 07/25/2021 03:26 AM    GFRAA >60 07/25/2021 03:26 AM    AGRATIO 0.8 07/25/2021 03:26 AM    GLUCOSE 138 07/25/2021 03:26 AM    PROT 5.7 07/25/2021 03:26 AM    CALCIUM 8.6 07/25/2021 03:26 AM    BILITOT 1.0 07/25/2021 03:26 AM    ALKPHOS 83 07/25/2021 03:26 AM    AST 19 07/25/2021 03:26 AM    ALT 35 07/25/2021 03:26 AM       POC Tests: No results for input(s): POCGLU, POCNA, POCK, POCCL, POCBUN, POCHEMO, POCHCT in the last 72 hours. Coags: No results found for: PROTIME, INR, APTT    HCG (If Applicable): No results found for: PREGTESTUR, PREGSERUM, HCG, HCGQUANT     ABGs: No results found for: PHART, PO2ART, NBN2NOJ, ENS2HNQ, BEART, D9VBJVVB     Type & Screen (If Applicable):  No results found for: LABABO, LABRH    Drug/Infectious Status (If Applicable):  No results found for: HIV, HEPCAB    COVID-19 Screening (If Applicable): No results found for: COVID19        Anesthesia Evaluation  Patient summary reviewed  Airway: Mallampati: II  TM distance: >3 FB   Neck ROM: full  Mouth opening: > = 3 FB   Dental:    (+) edentulous      Pulmonary:Negative Pulmonary ROS and normal exam    (+) current smoker                           Cardiovascular:  Exercise tolerance: good (>4 METS),   (+) hypertension: no interval change, CAD: no interval change,     Past MI: Last use of NTG~ 1 month ago. ROS comment: H/O rheumatic fever as child     Neuro/Psych:   Negative Neuro/Psych ROS              GI/Hepatic/Renal:   (+) GERD: no interval change, morbid obesity          Endo/Other:    (+) hypothyroidism::., malignancy/cancer (Ovarian cancer). Abdominal:             Vascular: negative vascular ROS.          Other Findings:           Anesthesia

## 2023-08-18 NOTE — PROGRESS NOTES
Update History & Physical    The patient's History and Physical was reviewed with the patient and I examined the patient. There was no change. The surgical site was confirmed by the patient and me. Plan: The risks, benefits, expected outcome, and alternative to the recommended procedure have been discussed with the patient. Patient understands and wants to proceed with the procedure. Will proceed with robotic cholecystectomy.     Electronically signed by Rhonda Salgado MD on 8/18/2023 at 7:07 AM

## 2023-08-18 NOTE — ANESTHESIA POSTPROCEDURE EVALUATION
Department of Anesthesiology  Postprocedure Note    Patient: No Infante  MRN: [id number]  YOB: 1960  Date of evaluation: 8/18/2023      Procedure Summary     Date: 08/18/23 Room / Location: [facility] MAIN 07 / [facility] MAIN OR    Anesthesia Start: 7415 Anesthesia Stop: 3318    Procedure: ROBOTIC CHOLECYSTECTOMY (Abdomen) Diagnosis:       Chronic cholecystitis      (Chronic cholecystitis [K81.1])    Surgeons:  Nain Hernandez MD Responsible Provider: Everton Fuller MD    Anesthesia Type: General ASA Status: 3          Anesthesia Type: General    Jacqueline Phase I: Jacqueline Score: 10    Jacqueline Phase II:        Anesthesia Post Evaluation    Patient location during evaluation: PACU  Patient participation: complete - patient participated  Level of consciousness: awake and alert  Pain score: 2  Airway patency: patent  Nausea & Vomiting: no nausea and no vomiting  Complications: no  Cardiovascular status: hemodynamically stable  Respiratory status: acceptable  Hydration status: euvolemic  Multimodal analgesia pain management approach  Pain management: adequate

## 2023-08-21 ENCOUNTER — TELEPHONE (OUTPATIENT)
Age: 63
End: 2023-08-21

## 2023-08-21 NOTE — TELEPHONE ENCOUNTER
I returned call to patient who left message with Jessica Bullock. I contacted patient who states she has been having discomfort/soreness in her rib cage area. Patient states areas seem a bit swollen. Patient states very concerned. I advised patient it will be a little swollen due to just had surgery on Friday 08/18/23. I advised patient swelling will go down as she starts to heal.  Patient states she is not taking percocet due to concerned about taking medication like that. Patient states she has been taking tylenol. Patient states no heavy lifting and she is walking around. I advised patient I will forward message to Dr Flower Aponte. Patient verbalized understanding.

## 2023-09-05 ENCOUNTER — HOSPITAL ENCOUNTER (EMERGENCY)
Facility: HOSPITAL | Age: 63
Discharge: HOME | End: 2023-09-05
Payer: MEDICAID

## 2023-09-05 ENCOUNTER — APPOINTMENT (OUTPATIENT)
Facility: HOSPITAL | Age: 63
End: 2023-09-05
Payer: MEDICAID

## 2023-09-05 VITALS
DIASTOLIC BLOOD PRESSURE: 96 MMHG | HEART RATE: 70 BPM | WEIGHT: 213 LBS | SYSTOLIC BLOOD PRESSURE: 156 MMHG | OXYGEN SATURATION: 95 % | BODY MASS INDEX: 35.49 KG/M2 | RESPIRATION RATE: 20 BRPM | HEIGHT: 65 IN | TEMPERATURE: 98.2 F

## 2023-09-05 DIAGNOSIS — R10.31 RIGHT LOWER QUADRANT ABDOMINAL PAIN: Primary | ICD-10-CM

## 2023-09-05 DIAGNOSIS — N30.00 ACUTE CYSTITIS WITHOUT HEMATURIA: ICD-10-CM

## 2023-09-05 DIAGNOSIS — T14.8XXA HEMATOMA: ICD-10-CM

## 2023-09-05 LAB
ALBUMIN SERPL-MCNC: 3.8 G/DL (ref 3.4–5)
ALBUMIN/GLOB SERPL: 1.3 (ref 0.8–1.7)
ALP SERPL-CCNC: 94 U/L (ref 45–117)
ALT SERPL-CCNC: 20 U/L (ref 13–56)
ANION GAP SERPL CALC-SCNC: 5 MMOL/L (ref 3–18)
APPEARANCE UR: CLEAR
AST SERPL-CCNC: 11 U/L (ref 10–38)
BACTERIA URNS QL MICRO: ABNORMAL /HPF
BASOPHILS # BLD: 0 K/UL (ref 0–0.1)
BASOPHILS NFR BLD: 0 % (ref 0–2)
BILIRUB SERPL-MCNC: 1 MG/DL (ref 0.2–1)
BILIRUB UR QL: NEGATIVE
BUN SERPL-MCNC: 16 MG/DL (ref 7–18)
BUN/CREAT SERPL: 25 (ref 12–20)
CALCIUM SERPL-MCNC: 9.3 MG/DL (ref 8.5–10.1)
CHLORIDE SERPL-SCNC: 112 MMOL/L (ref 100–111)
CO2 SERPL-SCNC: 26 MMOL/L (ref 21–32)
COLOR UR: YELLOW
CREAT SERPL-MCNC: 0.64 MG/DL (ref 0.6–1.3)
DIFFERENTIAL METHOD BLD: ABNORMAL
EOSINOPHIL # BLD: 0.1 K/UL (ref 0–0.4)
EOSINOPHIL NFR BLD: 1 % (ref 0–5)
EPITH CASTS URNS QL MICRO: ABNORMAL /LPF (ref 0–5)
ERYTHROCYTE [DISTWIDTH] IN BLOOD BY AUTOMATED COUNT: 13.2 % (ref 11.6–14.5)
GLOBULIN SER CALC-MCNC: 3 G/DL (ref 2–4)
GLUCOSE SERPL-MCNC: 126 MG/DL (ref 74–99)
GLUCOSE UR STRIP.AUTO-MCNC: NEGATIVE MG/DL
HCT VFR BLD AUTO: 37.3 % (ref 35–45)
HGB BLD-MCNC: 12.7 G/DL (ref 12–16)
HGB UR QL STRIP: NEGATIVE
IMM GRANULOCYTES # BLD AUTO: 0 K/UL (ref 0–0.04)
IMM GRANULOCYTES NFR BLD AUTO: 0 % (ref 0–0.5)
KETONES UR QL STRIP.AUTO: NEGATIVE MG/DL
LEUKOCYTE ESTERASE UR QL STRIP.AUTO: ABNORMAL
LIPASE SERPL-CCNC: 400 U/L (ref 73–393)
LYMPHOCYTES # BLD: 1.6 K/UL (ref 0.9–3.6)
LYMPHOCYTES NFR BLD: 24 % (ref 21–52)
MCH RBC QN AUTO: 32.8 PG (ref 24–34)
MCHC RBC AUTO-ENTMCNC: 34 G/DL (ref 31–37)
MCV RBC AUTO: 96.4 FL (ref 78–100)
MONOCYTES # BLD: 1 K/UL (ref 0.05–1.2)
MONOCYTES NFR BLD: 14 % (ref 3–10)
NEUTS SEG # BLD: 4.1 K/UL (ref 1.8–8)
NEUTS SEG NFR BLD: 61 % (ref 40–73)
NITRITE UR QL STRIP.AUTO: NEGATIVE
NRBC # BLD: 0 K/UL (ref 0–0.01)
NRBC BLD-RTO: 0 PER 100 WBC
PH UR STRIP: 5.5 (ref 5–8)
PLATELET # BLD AUTO: 130 K/UL (ref 135–420)
PMV BLD AUTO: 12.9 FL (ref 9.2–11.8)
POTASSIUM SERPL-SCNC: 3.8 MMOL/L (ref 3.5–5.5)
PROT SERPL-MCNC: 6.8 G/DL (ref 6.4–8.2)
PROT UR STRIP-MCNC: NEGATIVE MG/DL
RBC # BLD AUTO: 3.87 M/UL (ref 4.2–5.3)
RBC #/AREA URNS HPF: ABNORMAL /HPF (ref 0–5)
SODIUM SERPL-SCNC: 143 MMOL/L (ref 136–145)
SP GR UR REFRACTOMETRY: 1.01 (ref 1–1.03)
UROBILINOGEN UR QL STRIP.AUTO: 0.2 EU/DL (ref 0.2–1)
WBC # BLD AUTO: 6.8 K/UL (ref 4.6–13.2)
WBC URNS QL MICRO: ABNORMAL /HPF (ref 0–4)

## 2023-09-05 PROCEDURE — 6360000002 HC RX W HCPCS: Performed by: PHYSICIAN ASSISTANT

## 2023-09-05 PROCEDURE — 81001 URINALYSIS AUTO W/SCOPE: CPT

## 2023-09-05 PROCEDURE — 2580000003 HC RX 258: Performed by: PHYSICIAN ASSISTANT

## 2023-09-05 PROCEDURE — 74177 CT ABD & PELVIS W/CONTRAST: CPT

## 2023-09-05 PROCEDURE — 87086 URINE CULTURE/COLONY COUNT: CPT

## 2023-09-05 PROCEDURE — 96374 THER/PROPH/DIAG INJ IV PUSH: CPT

## 2023-09-05 PROCEDURE — 83690 ASSAY OF LIPASE: CPT

## 2023-09-05 PROCEDURE — 99285 EMERGENCY DEPT VISIT HI MDM: CPT

## 2023-09-05 PROCEDURE — 85025 COMPLETE CBC W/AUTO DIFF WBC: CPT

## 2023-09-05 PROCEDURE — 6360000004 HC RX CONTRAST MEDICATION: Performed by: PHYSICIAN ASSISTANT

## 2023-09-05 PROCEDURE — 80053 COMPREHEN METABOLIC PANEL: CPT

## 2023-09-05 PROCEDURE — 87077 CULTURE AEROBIC IDENTIFY: CPT

## 2023-09-05 PROCEDURE — 87186 SC STD MICRODIL/AGAR DIL: CPT

## 2023-09-05 RX ORDER — NITROFURANTOIN 25; 75 MG/1; MG/1
100 CAPSULE ORAL 2 TIMES DAILY
Qty: 10 CAPSULE | Refills: 0 | Status: SHIPPED | OUTPATIENT
Start: 2023-09-05 | End: 2023-09-10

## 2023-09-05 RX ORDER — KETOROLAC TROMETHAMINE 15 MG/ML
15 INJECTION, SOLUTION INTRAMUSCULAR; INTRAVENOUS
Status: COMPLETED | OUTPATIENT
Start: 2023-09-05 | End: 2023-09-05

## 2023-09-05 RX ORDER — 0.9 % SODIUM CHLORIDE 0.9 %
500 INTRAVENOUS SOLUTION INTRAVENOUS ONCE
Status: COMPLETED | OUTPATIENT
Start: 2023-09-05 | End: 2023-09-05

## 2023-09-05 RX ADMIN — IOPAMIDOL 90 ML: 612 INJECTION, SOLUTION INTRAVENOUS at 13:15

## 2023-09-05 RX ADMIN — KETOROLAC TROMETHAMINE 15 MG: 15 INJECTION, SOLUTION INTRAMUSCULAR; INTRAVENOUS at 12:09

## 2023-09-05 RX ADMIN — SODIUM CHLORIDE 500 ML: 9 INJECTION, SOLUTION INTRAVENOUS at 10:46

## 2023-09-05 ASSESSMENT — PAIN DESCRIPTION - DESCRIPTORS
DESCRIPTORS: OTHER (COMMENT)
DESCRIPTORS: ACHING

## 2023-09-05 ASSESSMENT — PAIN DESCRIPTION - ONSET: ONSET: SUDDEN

## 2023-09-05 ASSESSMENT — PAIN DESCRIPTION - PAIN TYPE: TYPE: ACUTE PAIN

## 2023-09-05 ASSESSMENT — ENCOUNTER SYMPTOMS
ABDOMINAL PAIN: 1
NAUSEA: 1
VOMITING: 0
SHORTNESS OF BREATH: 0

## 2023-09-05 ASSESSMENT — PAIN DESCRIPTION - LOCATION
LOCATION: ABDOMEN
LOCATION: ABDOMEN;BACK

## 2023-09-05 ASSESSMENT — PAIN DESCRIPTION - DIRECTION: RADIATING_TOWARDS: RIGHT BACK

## 2023-09-05 ASSESSMENT — PAIN SCALES - GENERAL
PAINLEVEL_OUTOF10: 10
PAINLEVEL_OUTOF10: 10

## 2023-09-05 ASSESSMENT — PAIN DESCRIPTION - ORIENTATION: ORIENTATION: RIGHT

## 2023-09-05 ASSESSMENT — PAIN - FUNCTIONAL ASSESSMENT
PAIN_FUNCTIONAL_ASSESSMENT: 0-10
PAIN_FUNCTIONAL_ASSESSMENT: ACTIVITIES ARE NOT PREVENTED

## 2023-09-05 NOTE — ED PROVIDER NOTES
1.0 0.05 - 1.2 K/UL    Eosinophils Absolute 0.1 0.0 - 0.4 K/UL    Basophils Absolute 0.0 0.0 - 0.1 K/UL    Absolute Immature Granulocyte 0.0 0.00 - 0.04 K/UL    Differential Type AUTOMATED     Lipase    Collection Time: 09/05/23 11:23 AM   Result Value Ref Range    Lipase 400 (H) 73 - 393 U/L   Comprehensive Metabolic Panel    Collection Time: 09/05/23 11:23 AM   Result Value Ref Range    Sodium 143 136 - 145 mmol/L    Potassium 3.8 3.5 - 5.5 mmol/L    Chloride 112 (H) 100 - 111 mmol/L    CO2 26 21 - 32 mmol/L    Anion Gap 5 3.0 - 18 mmol/L    Glucose 126 (H) 74 - 99 mg/dL    BUN 16 7.0 - 18 MG/DL    Creatinine 0.64 0.6 - 1.3 MG/DL    Bun/Cre Ratio 25 (H) 12 - 20      Est, Glom Filt Rate >60 >60 ml/min/1.73m2    Calcium 9.3 8.5 - 10.1 MG/DL    Total Bilirubin 1.0 0.2 - 1.0 MG/DL    ALT 20 13 - 56 U/L    AST 11 10 - 38 U/L    Alk Phosphatase 94 45 - 117 U/L    Total Protein 6.8 6.4 - 8.2 g/dL    Albumin 3.8 3.4 - 5.0 g/dL    Globulin 3.0 2.0 - 4.0 g/dL    Albumin/Globulin Ratio 1.3 0.8 - 1.7         Radiologic Studies -   CT ABDOMEN PELVIS W IV CONTRAST Additional Contrast? None   Final Result      Right inferior rectus abdominis intramuscular hematoma measures approximately   12.8 x 6.3 x 12.7 cm. Post cholecystectomy. Colonic diverticula without findings of diverticulitis. Medical Decision Making   I am the first provider for this patient. I reviewed the vital signs, available nursing notes, past medical history, past surgical history, family history and social history. Vital Signs-Reviewed the patient's vital signs. Records Reviewed: Nursing Notes and Old Medical Records (Time of Review: 5:47 PM)    ED Course: Progress Notes, Reevaluation, and Consults:  1:50 PM: CT resulted. Called and discussed with Dr. Guillermina Fernando, general surgery, notes CT findings are likely not 2/2 surgery, see if patient had any recent trauma.  Would recommend warm compresses, Motrin, close follow-up in

## 2023-09-05 NOTE — DISCHARGE INSTRUCTIONS
Take medication as prescribed. Follow-up with your surgeon within 2 days for reassessment. Bring the results from this visit with you for their review. Return to the ED immediately for any new, worsening, or persistent symptoms, including fever, vomiting, or any other medical concerns.

## 2023-09-05 NOTE — ED TRIAGE NOTES
Patient is being evaluated for abdominal pain. Patient reports that she went to the bathroom at 0400 this morning when she noticed that she was having pain to her back. Patient reports that she is nauseous. Patient reports that her pain is to her RLQ. Patient reports that she had her gallbladder removed on the 18th of August. Patient reports that she did not call her provider. Patient states that she self administered 600mg of Ibuprofen this morning. Patient has a prescription for Percocet but reports that she does not want to take opioids.

## 2023-09-07 LAB
BACTERIA SPEC CULT: ABNORMAL
CC UR VC: ABNORMAL
SERVICE CMNT-IMP: ABNORMAL

## 2023-09-07 RX ORDER — SULFAMETHOXAZOLE AND TRIMETHOPRIM 800; 160 MG/1; MG/1
1 TABLET ORAL 2 TIMES DAILY
Qty: 20 TABLET | Refills: 0 | Status: SHIPPED | OUTPATIENT
Start: 2023-09-07 | End: 2023-09-17

## 2023-09-08 ENCOUNTER — TELEPHONE (OUTPATIENT)
Age: 63
End: 2023-09-08

## 2023-09-08 NOTE — TELEPHONE ENCOUNTER
Left voicemail message to contact office to schedule a post ED follow up - hematoma evaluation with Dr. Josh Garcia if interested in an earlier date appointment on September 11th with Dr. Hiren Loco. Hx cholecystectomy 08/2023.

## 2023-09-18 ENCOUNTER — OFFICE VISIT (OUTPATIENT)
Age: 63
End: 2023-09-18

## 2023-09-18 VITALS
HEIGHT: 65 IN | TEMPERATURE: 97 F | DIASTOLIC BLOOD PRESSURE: 86 MMHG | OXYGEN SATURATION: 97 % | SYSTOLIC BLOOD PRESSURE: 143 MMHG | HEART RATE: 70 BPM | WEIGHT: 217 LBS | BODY MASS INDEX: 36.15 KG/M2

## 2023-09-18 DIAGNOSIS — Z90.49 S/P CHOLECYSTECTOMY: Primary | ICD-10-CM

## 2023-09-18 PROCEDURE — 99024 POSTOP FOLLOW-UP VISIT: CPT | Performed by: SURGERY

## 2023-09-18 RX ORDER — OLMESARTAN MEDOXOMIL 20 MG/1
20 TABLET ORAL DAILY
Qty: 30 TABLET | Refills: 0 | OUTPATIENT
Start: 2023-09-18
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
## 2023-09-18 NOTE — PROGRESS NOTES
No Winn is a 58 y.o. female (: 1960) presenting to address:    Chief Complaint   Patient presents with    Post-Op Check     Cholecystectomy 23       Medication list and allergies have been reviewed with No Infante and updated as of today's date. I have gone over all Medical, Surgical and Social History with No Infante and updated/added the information accordingly. 1. Have you been to the ER, Urgent Care or Hospitalized since your last visit? Yes. [facility]-ER 23 abd pain          2. Have you followed up with your PCP or any other Physicians since your procedure/ last office visit?    No

## 2023-09-19 RX ORDER — OLMESARTAN MEDOXOMIL 20 MG/1
20 TABLET ORAL DAILY
Qty: 30 TABLET | Refills: 1 | Status: SHIPPED | OUTPATIENT
Start: 2023-09-19

## 2023-10-24 ENCOUNTER — TRANSCRIBE ORDERS (OUTPATIENT)
Facility: HOSPITAL | Age: 63
End: 2023-10-24

## 2023-10-24 DIAGNOSIS — Z12.31 SCREENING MAMMOGRAM FOR HIGH-RISK PATIENT: Primary | ICD-10-CM

## 2023-11-13 ENCOUNTER — HOSPITAL ENCOUNTER (OUTPATIENT)
Facility: HOSPITAL | Age: 63
Discharge: HOME | End: 2023-11-16
Attending: FAMILY MEDICINE
Payer: MEDICAID

## 2023-11-13 VITALS — WEIGHT: 213 LBS | HEIGHT: 65 IN | BODY MASS INDEX: 35.49 KG/M2

## 2023-11-13 DIAGNOSIS — Z12.31 SCREENING MAMMOGRAM FOR HIGH-RISK PATIENT: ICD-10-CM

## 2023-11-13 PROCEDURE — 77063 BREAST TOMOSYNTHESIS BI: CPT

## 2024-02-12 ENCOUNTER — HOSPITAL ENCOUNTER (OUTPATIENT)
Facility: HOSPITAL | Age: 64
End: 2024-02-12
Attending: FAMILY MEDICINE
Payer: MEDICAID

## 2024-02-12 ENCOUNTER — HOSPITAL ENCOUNTER (OUTPATIENT)
Facility: HOSPITAL | Age: 64
Discharge: HOME | End: 2024-02-15
Attending: FAMILY MEDICINE
Payer: MEDICAID

## 2024-02-12 DIAGNOSIS — J44.9 COPD, MODERATE (HCC): ICD-10-CM

## 2024-02-12 DIAGNOSIS — F17.210 CIGARETTE NICOTINE DEPENDENCE WITHOUT COMPLICATION: ICD-10-CM

## 2024-02-12 PROCEDURE — 71271 CT THORAX LUNG CANCER SCR C-: CPT

## 2024-02-13 ENCOUNTER — HOSPITAL ENCOUNTER (OUTPATIENT)
Facility: HOSPITAL | Age: 64
Discharge: HOME | End: 2024-02-16
Attending: FAMILY MEDICINE
Payer: MEDICAID

## 2024-02-13 DIAGNOSIS — Z13.820 SCREENING FOR OSTEOPOROSIS: ICD-10-CM

## 2024-02-13 DIAGNOSIS — Z78.0 POST-MENOPAUSAL: ICD-10-CM

## 2024-02-13 PROCEDURE — 77080 DXA BONE DENSITY AXIAL: CPT

## 2024-05-07 ENCOUNTER — HOSPITAL ENCOUNTER (OUTPATIENT)
Facility: HOSPITAL | Age: 64
Discharge: HOME | End: 2024-05-10
Attending: FAMILY MEDICINE
Payer: MEDICAID

## 2024-05-07 DIAGNOSIS — R91.1 LUNG NODULE: ICD-10-CM

## 2024-05-07 PROCEDURE — 71250 CT THORAX DX C-: CPT

## 2024-10-30 NOTE — PROGRESS NOTES
Problem: Risk for Spread of Infection  Goal: Prevent transmission of infectious organism to others  Description: Prevent the transmission of infectious organisms to other patients, staff members, and visitors. Outcome: Progressing Towards Goal     Problem: Falls - Risk of  Goal: *Absence of Falls  Description: Document Araceli Felicia Fall Risk and appropriate interventions in the flowsheet.   Outcome: Progressing Towards Goal  Note: Fall Risk Interventions:            Medication Interventions: Patient to call before getting OOB, Evaluate medications/consider consulting pharmacy         History of Falls Interventions: Door open when patient unattended         Problem: Gas Exchange - Impaired  Goal: Absence of hypoxia  Outcome: Progressing Towards Goal     Problem: Breathing Pattern - Ineffective  Goal: Ability to achieve and maintain a regular respiratory rate  Outcome: Progressing Towards Goal     Problem: Loneliness or Risk for Loneliness  Goal: Demonstrate positive use of time alone when socialization is not possible  Outcome: Progressing Towards Goal     Problem: Hypertension  Goal: *Blood pressure within specified parameters  Outcome: Progressing Towards Goal  Goal: *Fluid volume balance  Outcome: Progressing Towards Goal  Goal: *Labs within defined limits  Outcome: Progressing Towards Goal Appearance of wound between the toes and swelling. Leg discoloration may be secondary to vasculitis understanding of edema. No evidence of arterial disease. Low suspicion for DVT, and vascular ultrasound ___________. Concern for infection to toe, though no evidence of necrotizing fasciitis. No evidence of compartment syndrome. Appearance of wound between the toes and swelling. Leg discoloration may be secondary to vasculitis understanding of edema. No evidence of arterial disease. Low suspicion for DVT, and vascular ultrasound negative. Concern for infection to toe, though no evidence of necrotizing fasciitis. No evidence of compartment syndrome. Xray read from outpt reads as concerning for osteo. Patient well appearing, hemodynamically stable. Given IV abx. Admitted to internal medicine for further monitoring, w/u, and care.

## 2024-10-31 ENCOUNTER — APPOINTMENT (OUTPATIENT)
Facility: HOSPITAL | Age: 64
DRG: 872 | End: 2024-10-31
Payer: MEDICARE

## 2024-10-31 ENCOUNTER — HOSPITAL ENCOUNTER (INPATIENT)
Facility: HOSPITAL | Age: 64
LOS: 1 days | Discharge: HOME | DRG: 872 | End: 2024-11-01
Attending: EMERGENCY MEDICINE | Admitting: STUDENT IN AN ORGANIZED HEALTH CARE EDUCATION/TRAINING PROGRAM
Payer: MEDICARE

## 2024-10-31 DIAGNOSIS — A41.9 SEPTICEMIA (HCC): Primary | ICD-10-CM

## 2024-10-31 LAB
ALBUMIN SERPL-MCNC: 4 G/DL (ref 3.4–5)
ALBUMIN/GLOB SERPL: 1.4 (ref 0.8–1.7)
ALP SERPL-CCNC: 97 U/L (ref 45–117)
ALT SERPL-CCNC: 17 U/L (ref 13–56)
ANION GAP SERPL CALC-SCNC: 7 MMOL/L (ref 3–18)
APPEARANCE UR: CLEAR
AST SERPL-CCNC: 10 U/L (ref 10–38)
BACTERIA URNS QL MICRO: ABNORMAL /HPF
BASOPHILS # BLD: 0 K/UL (ref 0–0.1)
BASOPHILS NFR BLD: 0 % (ref 0–2)
BILIRUB SERPL-MCNC: 2.6 MG/DL (ref 0.2–1)
BILIRUB UR QL: NEGATIVE
BUN SERPL-MCNC: 17 MG/DL (ref 7–18)
BUN/CREAT SERPL: 17 (ref 12–20)
CALCIUM SERPL-MCNC: 9.5 MG/DL (ref 8.5–10.1)
CHLORIDE SERPL-SCNC: 108 MMOL/L (ref 100–111)
CO2 SERPL-SCNC: 23 MMOL/L (ref 21–32)
COLOR UR: YELLOW
CREAT SERPL-MCNC: 1.02 MG/DL (ref 0.6–1.3)
DIFFERENTIAL METHOD BLD: ABNORMAL
EOSINOPHIL # BLD: 0 K/UL (ref 0–0.4)
EOSINOPHIL NFR BLD: 0 % (ref 0–5)
EPITH CASTS URNS QL MICRO: ABNORMAL /LPF (ref 0–5)
ERYTHROCYTE [DISTWIDTH] IN BLOOD BY AUTOMATED COUNT: 13.6 % (ref 11.6–14.5)
GLOBULIN SER CALC-MCNC: 2.8 G/DL (ref 2–4)
GLUCOSE SERPL-MCNC: 127 MG/DL (ref 74–99)
GLUCOSE UR STRIP.AUTO-MCNC: NEGATIVE MG/DL
HCT VFR BLD AUTO: 42.5 % (ref 35–45)
HGB BLD-MCNC: 14 G/DL (ref 12–16)
HGB UR QL STRIP: ABNORMAL
IMM GRANULOCYTES # BLD AUTO: 0 K/UL (ref 0–0.04)
IMM GRANULOCYTES NFR BLD AUTO: 0 % (ref 0–0.5)
KETONES UR QL STRIP.AUTO: NEGATIVE MG/DL
LACTATE SERPL-SCNC: 2.1 MMOL/L (ref 0.4–2)
LEUKOCYTE ESTERASE UR QL STRIP.AUTO: ABNORMAL
LYMPHOCYTES # BLD: 0.6 K/UL (ref 0.9–3.6)
LYMPHOCYTES NFR BLD: 3 % (ref 21–52)
MCH RBC QN AUTO: 32.3 PG (ref 24–34)
MCHC RBC AUTO-ENTMCNC: 32.9 G/DL (ref 31–37)
MCV RBC AUTO: 97.9 FL (ref 78–100)
MONOCYTES # BLD: 3.1 K/UL (ref 0.05–1.2)
MONOCYTES NFR BLD: 17 % (ref 3–10)
NEUTS SEG # BLD: 14.8 K/UL (ref 1.8–8)
NEUTS SEG NFR BLD: 80 % (ref 40–73)
NITRITE UR QL STRIP.AUTO: NEGATIVE
NRBC # BLD: 0 K/UL (ref 0–0.01)
NRBC BLD-RTO: 0 PER 100 WBC
NT PRO BNP: 229 PG/ML (ref 0–900)
PH UR STRIP: 6 (ref 5–8)
PLATELET # BLD AUTO: 124 K/UL (ref 135–420)
PLATELET COMMENT: ABNORMAL
PMV BLD AUTO: 12.7 FL (ref 9.2–11.8)
POTASSIUM SERPL-SCNC: 4 MMOL/L (ref 3.5–5.5)
PROT SERPL-MCNC: 6.8 G/DL (ref 6.4–8.2)
PROT UR STRIP-MCNC: NEGATIVE MG/DL
RBC # BLD AUTO: 4.34 M/UL (ref 4.2–5.3)
RBC #/AREA URNS HPF: ABNORMAL /HPF (ref 0–5)
RBC MORPH BLD: ABNORMAL
SODIUM SERPL-SCNC: 138 MMOL/L (ref 136–145)
SP GR UR REFRACTOMETRY: 1.02 (ref 1–1.03)
TROPONIN I SERPL HS-MCNC: 7 NG/L (ref 0–54)
UROBILINOGEN UR QL STRIP.AUTO: 0.2 EU/DL (ref 0.2–1)
WBC # BLD AUTO: 18.5 K/UL (ref 4.6–13.2)
WBC URNS QL MICRO: ABNORMAL /HPF (ref 0–5)

## 2024-10-31 PROCEDURE — 71275 CT ANGIOGRAPHY CHEST: CPT

## 2024-10-31 PROCEDURE — 84484 ASSAY OF TROPONIN QUANT: CPT

## 2024-10-31 PROCEDURE — 96374 THER/PROPH/DIAG INJ IV PUSH: CPT

## 2024-10-31 PROCEDURE — 87186 SC STD MICRODIL/AGAR DIL: CPT

## 2024-10-31 PROCEDURE — 87040 BLOOD CULTURE FOR BACTERIA: CPT

## 2024-10-31 PROCEDURE — 87086 URINE CULTURE/COLONY COUNT: CPT

## 2024-10-31 PROCEDURE — 74177 CT ABD & PELVIS W/CONTRAST: CPT

## 2024-10-31 PROCEDURE — 6360000002 HC RX W HCPCS: Performed by: EMERGENCY MEDICINE

## 2024-10-31 PROCEDURE — 1100000000 HC RM PRIVATE

## 2024-10-31 PROCEDURE — 81001 URINALYSIS AUTO W/SCOPE: CPT

## 2024-10-31 PROCEDURE — 6360000004 HC RX CONTRAST MEDICATION: Performed by: EMERGENCY MEDICINE

## 2024-10-31 PROCEDURE — 83880 ASSAY OF NATRIURETIC PEPTIDE: CPT

## 2024-10-31 PROCEDURE — 93005 ELECTROCARDIOGRAM TRACING: CPT | Performed by: EMERGENCY MEDICINE

## 2024-10-31 PROCEDURE — 87088 URINE BACTERIA CULTURE: CPT

## 2024-10-31 PROCEDURE — 80053 COMPREHEN METABOLIC PANEL: CPT

## 2024-10-31 PROCEDURE — 96375 TX/PRO/DX INJ NEW DRUG ADDON: CPT

## 2024-10-31 PROCEDURE — 83605 ASSAY OF LACTIC ACID: CPT

## 2024-10-31 PROCEDURE — 99285 EMERGENCY DEPT VISIT HI MDM: CPT

## 2024-10-31 PROCEDURE — 85025 COMPLETE CBC W/AUTO DIFF WBC: CPT

## 2024-10-31 PROCEDURE — 2580000003 HC RX 258: Performed by: EMERGENCY MEDICINE

## 2024-10-31 RX ORDER — 0.9 % SODIUM CHLORIDE 0.9 %
1000 INTRAVENOUS SOLUTION INTRAVENOUS ONCE
Status: COMPLETED | OUTPATIENT
Start: 2024-10-31 | End: 2024-11-01

## 2024-10-31 RX ORDER — IOPAMIDOL 755 MG/ML
95 INJECTION, SOLUTION INTRAVASCULAR
Status: COMPLETED | OUTPATIENT
Start: 2024-10-31 | End: 2024-10-31

## 2024-10-31 RX ORDER — ONDANSETRON 2 MG/ML
4 INJECTION INTRAMUSCULAR; INTRAVENOUS
Status: COMPLETED | OUTPATIENT
Start: 2024-10-31 | End: 2024-10-31

## 2024-10-31 RX ORDER — 0.9 % SODIUM CHLORIDE 0.9 %
250 INTRAVENOUS SOLUTION INTRAVENOUS ONCE
Status: COMPLETED | OUTPATIENT
Start: 2024-10-31 | End: 2024-10-31

## 2024-10-31 RX ADMIN — SODIUM CHLORIDE 250 ML: 9 INJECTION, SOLUTION INTRAVENOUS at 20:00

## 2024-10-31 RX ADMIN — WATER 1000 MG: 1 INJECTION INTRAMUSCULAR; INTRAVENOUS; SUBCUTANEOUS at 23:06

## 2024-10-31 RX ADMIN — IOPAMIDOL 95 ML: 755 INJECTION, SOLUTION INTRAVENOUS at 21:21

## 2024-10-31 RX ADMIN — SODIUM CHLORIDE 1000 ML: 9 INJECTION, SOLUTION INTRAVENOUS at 23:07

## 2024-10-31 RX ADMIN — ONDANSETRON 4 MG: 2 INJECTION INTRAMUSCULAR; INTRAVENOUS at 20:20

## 2024-10-31 ASSESSMENT — ENCOUNTER SYMPTOMS
COUGH: 0
ABDOMINAL DISTENTION: 1
SHORTNESS OF BREATH: 0
ABDOMINAL PAIN: 0
VOMITING: 0
NAUSEA: 1

## 2024-10-31 NOTE — ED TRIAGE NOTES
Pt with fever and nausea since last night. Pt has taken tylenol and ibuporfin to manage the new onset of sweating and suspected fever. No N/V, and denies SOB/CP. Pt has A-fib and sees Dr Allred. Hx ovarian CA and COPD, currently a smoker.

## 2024-10-31 NOTE — ED PROVIDER NOTES
EMERGENCY DEPARTMENT HISTORY AND PHYSICAL EXAM    10:36 PM      Date: 10/31/2024  Patient Name: No Infante    History of Presenting Illness     Chief Complaint   Patient presents with    Nausea    Fever       History From: Patient    No Infante is a 64 y.o. female   63 y/o f w/ pmhx of copd, Gastric reflux, Hypertension, Hypothyroidism, Ovarian cancer s/p hysterectomy 2010 here for feeling feverish at home.  Patient states she was in her usual state of health until today.  States she was totally fine yesterday.  Patient states today she noticed that she had increasing abdominal girth.  Patient came in today and was noted to be hypoxic to the 80s tachycardic as well to the 100 teens with a blood pressure of 90/62.  Patient states she does not really feel short of breath but she does feel fatigued.  States that she slept all day.    Denies chest pain, shortness of breath  Denies new leg swelling  Denies vomiting though says she is nauseous               Nursing Notes were all reviewed and agreed with or any disagreements were addressed in the HPI.    PCP: Gali Madrid MD    Current Facility-Administered Medications   Medication Dose Route Frequency Provider Last Rate Last Admin    cefepime (MAXIPIME) 1,000 mg in sodium chloride 0.9 % 50 mL IVPB (mini-bag)  1,000 mg IntraVENous Once Ford Patrick MD        sodium chloride 0.9 % bolus 1,000 mL  1,000 mL IntraVENous Once Ford Patrick MD         Current Outpatient Medications   Medication Sig Dispense Refill    olmesartan (BENICAR) 20 MG tablet Take 1 tablet by mouth once daily 30 tablet 1    cetirizine (ZYRTEC) 10 MG tablet Take 1 tablet by mouth daily      bisacodyl (DULCOLAX) 5 MG EC tablet as needed      Multiple Vitamin (MULTIVITAMIN ADULT PO) Take by mouth daily Women's 50 + Centrum Silver      levothyroxine (SYNTHROID) 25 MCG tablet Take 1 tablet by mouth every other day      acetaminophen (TYLENOL) 325 MG tablet Take 2 tablets by mouth    10/31/24 1924 111/74 -- -- -- -- -- -- -- --   10/31/24 1925 -- -- (!) 112 19 92 % -- -- -- --   10/31/24 1935 90/62 -- (!) 104 (!) 31 92 % -- -- -- --   10/31/24 2000 98/63 -- 96 26 93 % -- -- -- --   10/31/24 2030 94/63 -- 87 25 94 % -- -- -- --   10/31/24 2100 102/67 -- 88 26 94 % -- -- -- --      Recent Labs     10/31/24  1925   WBC 18.5*   CREATININE 1.02   BILITOT 2.6*   *        Severe sepsis identified date: 10/31/24 time: 2030    Fluid Resuscitation Rationale: less than 30ml/kg because initially concerned for overload. Instead, 1500cc was ordered.    Repeat lactate level: ordered and pending at this time    Reassessment Exam: Not applicable. Patient does not have septic shock.         Diagnosis     Clinical Impression: No diagnosis found.    Disposition: admit    No follow-up provider specified.     Disclaimer: Sections of this note are dictated using utilizing voice recognition software.  Minor typographical errors may be present. If questions arise, please do not hesitate to contact me or call our department.         Ford Patrick MD  10/31/24 4567

## 2024-11-01 VITALS
HEIGHT: 65 IN | RESPIRATION RATE: 21 BRPM | OXYGEN SATURATION: 97 % | SYSTOLIC BLOOD PRESSURE: 112 MMHG | TEMPERATURE: 100.7 F | DIASTOLIC BLOOD PRESSURE: 75 MMHG | WEIGHT: 213.8 LBS | HEART RATE: 103 BPM | BODY MASS INDEX: 35.62 KG/M2

## 2024-11-01 PROBLEM — N12 PYELONEPHRITIS: Status: ACTIVE | Noted: 2024-11-01

## 2024-11-01 LAB
ANION GAP SERPL CALC-SCNC: 7 MMOL/L (ref 3–18)
B PERT DNA SPEC QL NAA+PROBE: NOT DETECTED
BORDETELLA PARAPERTUSSIS BY PCR: NOT DETECTED
BUN SERPL-MCNC: 16 MG/DL (ref 7–18)
BUN/CREAT SERPL: 19 (ref 12–20)
C PNEUM DNA SPEC QL NAA+PROBE: NOT DETECTED
CALCIUM SERPL-MCNC: 8.7 MG/DL (ref 8.5–10.1)
CHLORIDE SERPL-SCNC: 109 MMOL/L (ref 100–111)
CO2 SERPL-SCNC: 23 MMOL/L (ref 21–32)
CREAT SERPL-MCNC: 0.83 MG/DL (ref 0.6–1.3)
EKG ATRIAL RATE: 104 BPM
EKG DIAGNOSIS: NORMAL
EKG P AXIS: 15 DEGREES
EKG P-R INTERVAL: 144 MS
EKG Q-T INTERVAL: 344 MS
EKG QRS DURATION: 76 MS
EKG QTC CALCULATION (BAZETT): 452 MS
EKG R AXIS: 18 DEGREES
EKG T AXIS: -15 DEGREES
EKG VENTRICULAR RATE: 104 BPM
FLUAV SUBTYP SPEC NAA+PROBE: NOT DETECTED
FLUBV RNA SPEC QL NAA+PROBE: NOT DETECTED
GLUCOSE SERPL-MCNC: 103 MG/DL (ref 74–99)
HADV DNA SPEC QL NAA+PROBE: NOT DETECTED
HCOV 229E RNA SPEC QL NAA+PROBE: NOT DETECTED
HCOV HKU1 RNA SPEC QL NAA+PROBE: NOT DETECTED
HCOV NL63 RNA SPEC QL NAA+PROBE: NOT DETECTED
HCOV OC43 RNA SPEC QL NAA+PROBE: NOT DETECTED
HMPV RNA SPEC QL NAA+PROBE: NOT DETECTED
HPIV1 RNA SPEC QL NAA+PROBE: NOT DETECTED
HPIV2 RNA SPEC QL NAA+PROBE: NOT DETECTED
HPIV3 RNA SPEC QL NAA+PROBE: NOT DETECTED
HPIV4 RNA SPEC QL NAA+PROBE: NOT DETECTED
LACTATE SERPL-SCNC: 1.3 MMOL/L (ref 0.4–2)
M PNEUMO DNA SPEC QL NAA+PROBE: NOT DETECTED
POTASSIUM SERPL-SCNC: 3.7 MMOL/L (ref 3.5–5.5)
RSV RNA SPEC QL NAA+PROBE: NOT DETECTED
RV+EV RNA SPEC QL NAA+PROBE: NOT DETECTED
SARS-COV-2 RNA RESP QL NAA+PROBE: NOT DETECTED
SODIUM SERPL-SCNC: 139 MMOL/L (ref 136–145)

## 2024-11-01 PROCEDURE — 6360000002 HC RX W HCPCS: Performed by: STUDENT IN AN ORGANIZED HEALTH CARE EDUCATION/TRAINING PROGRAM

## 2024-11-01 PROCEDURE — 94640 AIRWAY INHALATION TREATMENT: CPT

## 2024-11-01 PROCEDURE — 93010 ELECTROCARDIOGRAM REPORT: CPT | Performed by: INTERNAL MEDICINE

## 2024-11-01 PROCEDURE — 94761 N-INVAS EAR/PLS OXIMETRY MLT: CPT

## 2024-11-01 PROCEDURE — 80048 BASIC METABOLIC PNL TOTAL CA: CPT

## 2024-11-01 PROCEDURE — 87040 BLOOD CULTURE FOR BACTERIA: CPT

## 2024-11-01 PROCEDURE — 0202U NFCT DS 22 TRGT SARS-COV-2: CPT

## 2024-11-01 PROCEDURE — 99239 HOSP IP/OBS DSCHRG MGMT >30: CPT | Performed by: STUDENT IN AN ORGANIZED HEALTH CARE EDUCATION/TRAINING PROGRAM

## 2024-11-01 PROCEDURE — 97116 GAIT TRAINING THERAPY: CPT

## 2024-11-01 PROCEDURE — 6370000000 HC RX 637 (ALT 250 FOR IP): Performed by: STUDENT IN AN ORGANIZED HEALTH CARE EDUCATION/TRAINING PROGRAM

## 2024-11-01 PROCEDURE — 83605 ASSAY OF LACTIC ACID: CPT

## 2024-11-01 PROCEDURE — 97161 PT EVAL LOW COMPLEX 20 MIN: CPT

## 2024-11-01 PROCEDURE — 36415 COLL VENOUS BLD VENIPUNCTURE: CPT

## 2024-11-01 PROCEDURE — 2580000003 HC RX 258: Performed by: STUDENT IN AN ORGANIZED HEALTH CARE EDUCATION/TRAINING PROGRAM

## 2024-11-01 RX ORDER — ASPIRIN 81 MG/1
81 TABLET, CHEWABLE ORAL DAILY
Status: DISCONTINUED | OUTPATIENT
Start: 2024-11-01 | End: 2024-11-01 | Stop reason: HOSPADM

## 2024-11-01 RX ORDER — ONDANSETRON 4 MG/1
4 TABLET, FILM COATED ORAL 3 TIMES DAILY PRN
Qty: 15 TABLET | Refills: 0 | Status: SHIPPED | OUTPATIENT
Start: 2024-11-01

## 2024-11-01 RX ORDER — ARFORMOTEROL TARTRATE 15 UG/2ML
15 SOLUTION RESPIRATORY (INHALATION)
Status: DISCONTINUED | OUTPATIENT
Start: 2024-11-01 | End: 2024-11-01 | Stop reason: HOSPADM

## 2024-11-01 RX ORDER — ONDANSETRON 4 MG/1
4 TABLET, ORALLY DISINTEGRATING ORAL EVERY 8 HOURS PRN
Status: DISCONTINUED | OUTPATIENT
Start: 2024-11-01 | End: 2024-11-01 | Stop reason: HOSPADM

## 2024-11-01 RX ORDER — GUAIFENESIN 600 MG/1
600 TABLET, EXTENDED RELEASE ORAL 2 TIMES DAILY
Status: DISCONTINUED | OUTPATIENT
Start: 2024-11-01 | End: 2024-11-01 | Stop reason: HOSPADM

## 2024-11-01 RX ORDER — LANOLIN ALCOHOL/MO/W.PET/CERES
1000 CREAM (GRAM) TOPICAL DAILY
Status: DISCONTINUED | OUTPATIENT
Start: 2024-11-01 | End: 2024-11-01 | Stop reason: HOSPADM

## 2024-11-01 RX ORDER — POLYETHYLENE GLYCOL 3350 17 G/17G
17 POWDER, FOR SOLUTION ORAL DAILY PRN
Status: DISCONTINUED | OUTPATIENT
Start: 2024-11-01 | End: 2024-11-01 | Stop reason: HOSPADM

## 2024-11-01 RX ORDER — SODIUM CHLORIDE, SODIUM LACTATE, POTASSIUM CHLORIDE, CALCIUM CHLORIDE 600; 310; 30; 20 MG/100ML; MG/100ML; MG/100ML; MG/100ML
INJECTION, SOLUTION INTRAVENOUS CONTINUOUS
Status: DISPENSED | OUTPATIENT
Start: 2024-11-01 | End: 2024-11-01

## 2024-11-01 RX ORDER — SODIUM CHLORIDE 0.9 % (FLUSH) 0.9 %
5-40 SYRINGE (ML) INJECTION EVERY 12 HOURS SCHEDULED
Status: DISCONTINUED | OUTPATIENT
Start: 2024-11-01 | End: 2024-11-01 | Stop reason: HOSPADM

## 2024-11-01 RX ORDER — MAGNESIUM SULFATE IN WATER 40 MG/ML
2000 INJECTION, SOLUTION INTRAVENOUS PRN
Status: DISCONTINUED | OUTPATIENT
Start: 2024-11-01 | End: 2024-11-01 | Stop reason: HOSPADM

## 2024-11-01 RX ORDER — ACETAMINOPHEN 650 MG/1
650 SUPPOSITORY RECTAL EVERY 6 HOURS PRN
Status: DISCONTINUED | OUTPATIENT
Start: 2024-11-01 | End: 2024-11-01 | Stop reason: HOSPADM

## 2024-11-01 RX ORDER — ONDANSETRON 2 MG/ML
4 INJECTION INTRAMUSCULAR; INTRAVENOUS EVERY 6 HOURS PRN
Status: DISCONTINUED | OUTPATIENT
Start: 2024-11-01 | End: 2024-11-01 | Stop reason: HOSPADM

## 2024-11-01 RX ORDER — LEVOTHYROXINE SODIUM 150 UG/1
150 TABLET ORAL DAILY
Status: DISCONTINUED | OUTPATIENT
Start: 2024-11-01 | End: 2024-11-01 | Stop reason: HOSPADM

## 2024-11-01 RX ORDER — ACETAMINOPHEN 325 MG/1
650 TABLET ORAL EVERY 6 HOURS PRN
Status: DISCONTINUED | OUTPATIENT
Start: 2024-11-01 | End: 2024-11-01 | Stop reason: HOSPADM

## 2024-11-01 RX ORDER — LEVOFLOXACIN 5 MG/ML
750 INJECTION, SOLUTION INTRAVENOUS EVERY 24 HOURS
Status: DISCONTINUED | OUTPATIENT
Start: 2024-11-01 | End: 2024-11-01

## 2024-11-01 RX ORDER — FAMOTIDINE 20 MG/1
20 TABLET, FILM COATED ORAL 2 TIMES DAILY
Status: DISCONTINUED | OUTPATIENT
Start: 2024-11-01 | End: 2024-11-01 | Stop reason: HOSPADM

## 2024-11-01 RX ORDER — NITROGLYCERIN 0.4 MG/1
0.4 TABLET SUBLINGUAL EVERY 5 MIN PRN
Status: DISCONTINUED | OUTPATIENT
Start: 2024-11-01 | End: 2024-11-01 | Stop reason: HOSPADM

## 2024-11-01 RX ORDER — BUDESONIDE 0.5 MG/2ML
1 INHALANT ORAL
Status: DISCONTINUED | OUTPATIENT
Start: 2024-11-01 | End: 2024-11-01 | Stop reason: HOSPADM

## 2024-11-01 RX ORDER — LEVOFLOXACIN 5 MG/ML
500 INJECTION, SOLUTION INTRAVENOUS EVERY 24 HOURS
Status: DISCONTINUED | OUTPATIENT
Start: 2024-11-01 | End: 2024-11-01

## 2024-11-01 RX ORDER — LEVOFLOXACIN 750 MG/1
750 TABLET, FILM COATED ORAL DAILY
Qty: 14 TABLET | Refills: 0 | Status: SHIPPED | OUTPATIENT
Start: 2024-11-01 | End: 2024-11-15

## 2024-11-01 RX ORDER — ATORVASTATIN CALCIUM 20 MG/1
20 TABLET, FILM COATED ORAL NIGHTLY
Status: DISCONTINUED | OUTPATIENT
Start: 2024-11-01 | End: 2024-11-01 | Stop reason: HOSPADM

## 2024-11-01 RX ORDER — LEVOFLOXACIN 5 MG/ML
500 INJECTION, SOLUTION INTRAVENOUS EVERY 24 HOURS
Status: DISCONTINUED | OUTPATIENT
Start: 2024-11-01 | End: 2024-11-01 | Stop reason: HOSPADM

## 2024-11-01 RX ORDER — SODIUM CHLORIDE 9 MG/ML
INJECTION, SOLUTION INTRAVENOUS PRN
Status: DISCONTINUED | OUTPATIENT
Start: 2024-11-01 | End: 2024-11-01 | Stop reason: HOSPADM

## 2024-11-01 RX ORDER — CIPROFLOXACIN 2 MG/ML
400 INJECTION, SOLUTION INTRAVENOUS EVERY 12 HOURS
Status: DISCONTINUED | OUTPATIENT
Start: 2024-11-01 | End: 2024-11-01

## 2024-11-01 RX ORDER — SODIUM CHLORIDE, SODIUM LACTATE, POTASSIUM CHLORIDE, AND CALCIUM CHLORIDE .6; .31; .03; .02 G/100ML; G/100ML; G/100ML; G/100ML
1000 INJECTION, SOLUTION INTRAVENOUS ONCE
Status: COMPLETED | OUTPATIENT
Start: 2024-11-01 | End: 2024-11-01

## 2024-11-01 RX ORDER — LANOLIN ALCOHOL/MO/W.PET/CERES
3 CREAM (GRAM) TOPICAL NIGHTLY PRN
Status: DISCONTINUED | OUTPATIENT
Start: 2024-11-01 | End: 2024-11-01 | Stop reason: HOSPADM

## 2024-11-01 RX ORDER — POTASSIUM CHLORIDE 1500 MG/1
40 TABLET, EXTENDED RELEASE ORAL PRN
Status: DISCONTINUED | OUTPATIENT
Start: 2024-11-01 | End: 2024-11-01 | Stop reason: HOSPADM

## 2024-11-01 RX ORDER — VITAMIN B COMPLEX
2000 TABLET ORAL
Status: DISCONTINUED | OUTPATIENT
Start: 2024-11-01 | End: 2024-11-01 | Stop reason: HOSPADM

## 2024-11-01 RX ORDER — LEVOFLOXACIN 5 MG/ML
250 INJECTION, SOLUTION INTRAVENOUS EVERY 24 HOURS
Status: DISCONTINUED | OUTPATIENT
Start: 2024-11-01 | End: 2024-11-01 | Stop reason: HOSPADM

## 2024-11-01 RX ORDER — POTASSIUM CHLORIDE 7.45 MG/ML
10 INJECTION INTRAVENOUS PRN
Status: DISCONTINUED | OUTPATIENT
Start: 2024-11-01 | End: 2024-11-01 | Stop reason: HOSPADM

## 2024-11-01 RX ORDER — ENOXAPARIN SODIUM 100 MG/ML
40 INJECTION SUBCUTANEOUS DAILY
Status: DISCONTINUED | OUTPATIENT
Start: 2024-11-01 | End: 2024-11-01 | Stop reason: HOSPADM

## 2024-11-01 RX ORDER — SODIUM CHLORIDE 0.9 % (FLUSH) 0.9 %
5-40 SYRINGE (ML) INJECTION PRN
Status: DISCONTINUED | OUTPATIENT
Start: 2024-11-01 | End: 2024-11-01 | Stop reason: HOSPADM

## 2024-11-01 RX ADMIN — LEVOFLOXACIN 250 MG: 5 INJECTION, SOLUTION INTRAVENOUS at 04:12

## 2024-11-01 RX ADMIN — SODIUM CHLORIDE, POTASSIUM CHLORIDE, SODIUM LACTATE AND CALCIUM CHLORIDE 1000 ML: 600; 310; 30; 20 INJECTION, SOLUTION INTRAVENOUS at 03:00

## 2024-11-01 RX ADMIN — FAMOTIDINE 20 MG: 20 TABLET ORAL at 02:52

## 2024-11-01 RX ADMIN — Medication 2000 UNITS: at 08:10

## 2024-11-01 RX ADMIN — ASPIRIN 81 MG CHEWABLE TABLET 81 MG: 81 TABLET CHEWABLE at 09:08

## 2024-11-01 RX ADMIN — ENOXAPARIN SODIUM 40 MG: 100 INJECTION SUBCUTANEOUS at 09:08

## 2024-11-01 RX ADMIN — SODIUM CHLORIDE, POTASSIUM CHLORIDE, SODIUM LACTATE AND CALCIUM CHLORIDE: 600; 310; 30; 20 INJECTION, SOLUTION INTRAVENOUS at 05:55

## 2024-11-01 RX ADMIN — SODIUM CHLORIDE, PRESERVATIVE FREE 10 ML: 5 INJECTION INTRAVENOUS at 09:08

## 2024-11-01 RX ADMIN — LEVOTHYROXINE SODIUM 150 MCG: 150 TABLET ORAL at 08:10

## 2024-11-01 RX ADMIN — IPRATROPIUM BROMIDE 0.5 MG: 0.5 SOLUTION RESPIRATORY (INHALATION) at 11:11

## 2024-11-01 RX ADMIN — LEVOFLOXACIN 500 MG: 5 INJECTION, SOLUTION INTRAVENOUS at 04:10

## 2024-11-01 RX ADMIN — FAMOTIDINE 20 MG: 20 TABLET ORAL at 09:08

## 2024-11-01 RX ADMIN — ARFORMOTEROL TARTRATE 15 MCG: 15 SOLUTION RESPIRATORY (INHALATION) at 07:02

## 2024-11-01 RX ADMIN — BUDESONIDE 1000 MCG: 0.5 INHALANT RESPIRATORY (INHALATION) at 07:02

## 2024-11-01 RX ADMIN — IPRATROPIUM BROMIDE 0.5 MG: 0.5 SOLUTION RESPIRATORY (INHALATION) at 07:02

## 2024-11-01 RX ADMIN — ACETAMINOPHEN 325MG 650 MG: 325 TABLET ORAL at 12:22

## 2024-11-01 RX ADMIN — GUAIFENESIN 600 MG: 600 TABLET, EXTENDED RELEASE ORAL at 09:08

## 2024-11-01 RX ADMIN — GUAIFENESIN 600 MG: 600 TABLET, EXTENDED RELEASE ORAL at 02:52

## 2024-11-01 RX ADMIN — ACETAMINOPHEN 325MG 650 MG: 325 TABLET ORAL at 02:53

## 2024-11-01 RX ADMIN — CYANOCOBALAMIN TAB 1000 MCG 1000 MCG: 1000 TAB at 09:08

## 2024-11-01 ASSESSMENT — PAIN SCALES - GENERAL
PAINLEVEL_OUTOF10: 3
PAINLEVEL_OUTOF10: 0

## 2024-11-01 ASSESSMENT — PAIN DESCRIPTION - LOCATION: LOCATION: HEAD

## 2024-11-01 NOTE — PROGRESS NOTES
conducted an initial consultation and Spiritual Assessment for No Infante, who is a 64 y.o.,female. Patient’s Primary Language is: English.   According to the patient’s EMR Holiness Affiliation is: Other.     The reason the Patient came to the hospital is:   Patient Active Problem List    Diagnosis Date Noted    Pyelonephritis 11/01/2024    Sepsis (HCC) 10/31/2024    COVID-19 07/11/2021    Respiratory failure with hypoxia 07/11/2021    Chest pain 08/31/2017    Degenerative lumbar spinal stenosis 07/02/2014    Spinal stenosis of lumbar region with radiculopathy 07/02/2014    Hypothyroid 02/10/2014    GERD (gastroesophageal reflux disease) 02/10/2014    Hypertension 02/10/2014        The  provided the following Interventions:  Initiated a relationship of care and support with the patient in bed 201 where she has been for the last 8 hours due to Sepsis.  Explored issues of cheyenne, belief, spirituality and Gnosticism/ritual needs while hospitalized. She does belong to a local [employer] and is a faithful attender, she says.  Listened empathically. There is no advance directive on file but patient says she knows that she has done one in the past and will get us a copy when possible.  Offered prayer on patients behalf. Discharged.      The following outcomes were achieved:  Patient shared limited information about her medical narrative and spiritual journey/beliefs.  Patient processed feeling about current hospitalization.  Patient expressed gratitude for pastoral care visit.    Assessment:  Patient does not have any Gnosticism/cultural needs that will affect patient’s preferences in health care.  There are no further spiritual or Gnosticism issues which require Spiritual Care Services interventions at this time.       Plan:  Chaplains will continue to follow and will provide pastoral care on an as needed/requested basis    Spiritual Health History and Assessment/Progress Note  [facility]

## 2024-11-01 NOTE — PLAN OF CARE
Problem: Discharge Planning  Goal: Discharge to home or other facility with appropriate resources  Outcome: Progressing     Problem: ABCDS Injury Assessment  Goal: Absence of physical injury  Outcome: Progressing     Problem: Safety - Adult  Goal: Free from fall injury  Outcome: Progressing     Problem: Skin/Tissue Integrity - Adult  Goal: Skin integrity remains intact  Outcome: Progressing     Problem: Infection - Adult  Goal: Absence of infection at discharge  Outcome: Progressing

## 2024-11-01 NOTE — PROGRESS NOTES
Brief Update    Patient with elderly mother who is alone at home currently; patient anxious about her well being and asking to be discharged.   I have reviewed patient's PMH and past culture data.   Will extend antibiotic course.  Patient given instructions to FU with PCP if she does not feel she is improving. She has been instructed to FU with ED if she continues to be febrile, develops nausea, vomiting and is unable to tolerate her oral antibiotics.     Maria D Barragan, DO

## 2024-11-01 NOTE — PROGRESS NOTES
Report received from Carol FIELDS.  Assisted patient to bed.  V/S taken.  Temp 103.  Dr Jj made aware.  Tylenol given, receiving IV fluids and antibiotics as ordered.

## 2024-11-01 NOTE — PROGRESS NOTES
OT order received and chart reviewed.  Spoke with patient in her room and she declined need for skilled OT at this time.  Patient is modified independent with basic self care tasks and functional mobility.  She lives with her mother who can assist her prn.  No skilled OT indicated at this time; will complete the order.  Thank you for the referral.  Eloisa Márquez MS OTR/L

## 2024-11-01 NOTE — ED NOTES
Pt medicated per MAR    Provider at bedside    Upon exiting pt's room, pt resting on ED stretcher in lowest position with wheels locked, NAD noted or expressed, no further wants or needs at this time, call light within reach

## 2024-11-01 NOTE — PLAN OF CARE
Problem: Physical Therapy - Adult  Goal: By Discharge: Performs mobility at highest level of function for planned discharge setting.  See evaluation for individualized goals.  Description: Physical Therapy Goals:  Initiated 11/1/2024 to be met within 7-10 days.    1.  Patient will move from supine to sit and sit to supine , scoot up and down, and roll side to side in bed with independence.    2.  Patient will transfer from bed to chair and chair to bed with independence using the least restrictive device.  3.  Patient will perform sit to stand with independence.  4.  Patient will ambulate with independence for 150 feet with the least restrictive device.   5.  Patient will ascend/descend 5 stairs with b/l handrail(s) with independence.    PLOF: Pt lives with her Mom in a two story home, 3 [address] with handrails.  Pt was independent with all mobility, no AD      Outcome: Progressing     PHYSICAL THERAPY EVALUATION    Patient: No Infante (64 y.o. female)  Date: 11/1/2024  Primary Diagnosis: Pyelonephritis [N12]  Septicemia (HCC) [A41.9]  Sepsis (HCC) [A41.9]       Precautions: Fall Risk, General Precautions,  ,  ,  ,  ,  ,  ,      ASSESSMENT :  Pt resting in bed upon entering room, agreeable to PT evaluation.  Pt was Gabriel for all bed mobility, good sitting balance.  Pt performed STS with no AD and CGA and ambulated ~ 100ft with CGA.  Pt with decreased kristy, slow shuffling steps with slight path deviations and pt reaching to hold on to wall or objects in the hallway.  Pt notes she is walking a little slower than normal, declines any AD.  Pt with no significant LOB, educated on taking transitions and turning slow as this is when she has a greater chance of losing her balance, pt verbalized understanding.  Pt returned to room, declined any need to use the restroom and returned supine in bed due to not having recliner in her room.  Pt left with all needs met and call bell within reach.  Pt would continue to benefit

## 2024-11-01 NOTE — CARE COORDINATION
11/01/24 1243   IMM Letter   IMM Letter given to Patient/Family/Significant other/Guardian/POA/by: Leslie Silva   IMM Letter date given: 11/01/24   IMM Letter time given: 1243     Patient waived 4 hour notice of discharge requirement by Medicare.    Leslie MURILLO,RN, [facility]  Case Management  [phone]

## 2024-11-01 NOTE — ED NOTES
TRANSFER - OUT REPORT:    Verbal report given to DONNIE Armstrong on April R Naresh  being transferred to [address] [place] [address] for routine progression of patient care       Report consisted of patient's Situation, Background, Assessment and   Recommendations(SBAR).     Information from the following report(s) ED SBAR was reviewed with the receiving nurse.    Superior Fall Assessment:                           Lines:   Peripheral IV 10/31/24 Right Forearm (Active)        Opportunity for questions and clarification was provided.

## 2024-11-01 NOTE — CARE COORDINATION
11/01/24 1041   Service Assessment   Patient Orientation Alert and Oriented   History Provided By Patient   Primary Caregiver Self   Accompanied By/Relationship no one at this time.   Support Systems Family Members;Parent;Friends/Neighbors   Patient's Healthcare Decision Maker is: Legal Next of Kin   PCP Verified by CM Yes   Last Visit to PCP Within last 3 months  (one month ago per patient)   Prior Functional Level Independent in ADLs/IADLs   Current Functional Level Independent in ADLs/IADLs   Can patient return to prior living arrangement Yes   Ability to make needs known: Good   Family able to assist with home care needs: Yes   Would you like for me to discuss the discharge plan with any other family members/significant others, and if so, who? Yes   Financial Resources Medicare   Social/Functional History   Lives With Parent   Type of Home House   Home Layout Two level   Home Access Stairs to enter with rails   Entrance Stairs - Number of Steps 4   Entrance Stairs - Rails Both   Bathroom Shower/Tub Tub/Shower unit   Bathroom Toilet Standard   Bathroom Equipment Shower chair   Bathroom Accessibility Accessible   Home Equipment None   Receives Help From Family   ADL Assistance Independent   Homemaking Assistance Independent   Homemaking Responsibilities Yes   Ambulation Assistance Independent   Transfer Assistance Independent   Active  Yes   Mode of Transportation Car   Occupation Full time employment   Type of Occupation  at [employer]   Discharge Planning   Type of Residence House   Living Arrangements Parent   Current Services Prior To Admission None   Potential Assistance Needed N/A   DME Ordered? No   Potential Assistance Purchasing Medications No   Type of Home Care Services None   Patient expects to be discharged to: House   One/Two Story Residence Two story   History of falls? 0   Services At/After Discharge   Transition of Care Consult (CM Consult) N/A    Resource

## 2024-11-01 NOTE — H&P
wave abnormality now evident in Lateral leads     Urinalysis    Collection Time: 10/31/24 10:08 PM   Result Value Ref Range    Color, UA YELLOW      Appearance CLEAR      Specific Gravity, UA 1.024 1.005 - 1.030      pH, Urine 6.0 5.0 - 8.0      Protein, UA Negative NEG mg/dL    Glucose, Ur Negative NEG mg/dL    Ketones, Urine Negative NEG mg/dL    Bilirubin, Urine Negative NEG      Blood, Urine SMALL (A) NEG      Urobilinogen, Urine 0.2 0.2 - 1.0 EU/dL    Nitrite, Urine Negative NEG      Leukocyte Esterase, Urine MODERATE (A) NEG     Urinalysis, Micro    Collection Time: 10/31/24 10:08 PM   Result Value Ref Range    WBC, UA 4-10 0 - 5 /hpf    RBC, UA 0-3 0 - 5 /hpf    Epithelial Cells, UA 1+ 0 - 5 /lpf    BACTERIA, URINE FEW (A) NEG /hpf       Imaging Reviewed:  CT ABDOMEN PELVIS W IV CONTRAST Additional Contrast? Radiologist Recommendation    Result Date: 10/31/2024  CT of the Abdomen and Pelvis With Contrast CLINICAL HISTORY: Fever and nausea since last night. Also history of ovarian cancer. Elevated white blood cell count. No urinalysis yet.. TECHNIQUE: After administration of oral and nonionic intravenous contrast, 5 mm MDCT was obtained of the abdomen and pelvis. Sagittal and coronal reformations then created with the original data set. All CT scans at this facility are performed using dose optimization techniques as appropriate to a performed exam, to include automated exposure control, adjustment of the mA and/or kV according to patient's size (including appropriate matching for site specific examinations), or use of iterative reconstruction technique. COMPARISON: CT chest today. Abdominal CT 9/5/2023 FINDINGS: CT Abdomen and pelvis: Lung bases: Basilar atelectasis as before. Moderate burden coronary artery disease of the heart. Liver: Normal size and attenuation. No focal lesions. Gallbladder/biliary: Prior cholecystectomy. No duct dilatation. Spleen: Normal attenuation and size Pancreas: Normal  optimization techniques as appropriate to a performed exam, to include automated exposure control, adjustment of the mA and/or kV according to patient's size (including appropriate matching for site specific examinations), or use of iterative reconstruction technique. COMPARISON: None. FINDINGS: Lungs: Dependent subsegmental changes are present both lung bases, worsened since prior. There are secretions in the trachea, right mainstem bronchus and peribronchial thickening in the lower lobes. Centrilobular lucencies are seen in the lungs along with paraseptal emphysema. Pleural space: No effusions. Heart and pericardium: Mild burden coronary heart artery disease. Normal chamber size. No pericardial effusion. No right heart enlargement. Great vessels and mediastinum: Aorta and arch vessels are normally enhancing. There is moderate stenosis proximal left subclavian artery due to plaque. No aneurysm or dissection. Pulmonary arteries normally enhancing. Main pulmonary artery 3.5 cm. Aorta at the same level 3.7 cm. Lymph nodes: No lymphadenopathy. Base of neck: Unremarkable Upper abdomen: Perinephric stranding around the left kidney. See abdominal CT for discussion. Otherwise included solid organs and hollow viscera are unremarkable. MSK/body wall: No acute abnormalities.     Bronchitis. Bibasilar atelectasis superimposed on emphysema. No pulmonary embolism. Electronically signed by Remedios Solis    Assessment/Plan     Hospital Problems             Last Modified POA    * (Principal) Sepsis (HCC) 10/31/2024 Yes    Pyelonephritis 11/1/2024 Yes   UTI, concern for pyelonephritis  -Initiated on cefepime, tolerated okay in the ED will switch to Levaquin given angioedema allergy to Augmentin  -Follow urine and blood cultures  -Received 1.5 L of IV fluids in the emergency department will bolus an additional liter and give gentle maintenance fluids.  -Telemetry    COPD  -Home inhaler, O2 as needed wean for SpO2 greater than

## 2024-11-01 NOTE — CARE COORDINATION
Discharge order noted for today. Orders received. No needs identified at this time. Case management remains available as needed.    Patient's states her mother will transport her home at time of discharge.    Leslie LEIVAN,RN, [facility]  Case Management  [phone]

## 2024-11-01 NOTE — DISCHARGE SUMMARY
Discharge Summary    Patient: No Infante MRN: [id number]  CSN: [id number]    YOB: 1960  Age: 64 y.o.  Sex: female    DOA: 10/31/2024 LOS:  LOS: 1 day   Discharge Date: 11/1/24      Admission Diagnosis: Pyelonephritis [N12]  Septicemia (HCC) [A41.9]  Sepsis (HCC) [A41.9]    Discharge Diagnosis:    Principal Problem:    Sepsis (HCC)  Active Problems:    Hypothyroid    GERD (gastroesophageal reflux disease)    Hypertension    Pyelonephritis  Resolved Problems:    * No resolved hospital problems. *       Discharge Condition: Stable  Discharge Disposition: home     PHYSICAL EXAM  Visit Vitals  /75   Pulse 86   Temp (!) 100.7 °F (38.2 °C) (Axillary)   Resp 21   Ht 1.651 m (5' 5\")   Wt 97 kg (213 lb 12.8 oz)   SpO2 97%   BMI 35.58 kg/m²       General: Alert, cooperative, no acute distress    HEENT: NC, Atraumatic.  PERRLA, EOMI. Anicteric sclerae.  Lungs:  CTA Bilaterally. No Wheezing/Rhonchi/Rales.  Heart:  Regular  rhythm,  No murmur, No Rubs, No Gallops  Abdomen: Soft, Non distended, Non tender.  +Bowel sounds, no HSM  Extremities: No c/c/e  Psych:   Good insight. Not anxious or agitated.  Neurologic:  CN 2-12 grossly intact, oriented X 3.  No acute neurological                                 Deficits,     Hospital Course By Problem:   Per H&P:  No Infante is a 64 y.o. female with a PMHx of COPD(no home O2 at baseline), hypertension, hyperlipidemia,hypothyroidism who presented to the ED with with nausea, dysuria, diaphoresis, fever ongoing for the past 2 days. She reports poor oral intake during this time.  She reports left-sided flank pain.  Patient is compliant with home medications and denies new medication changes.  She smokes 1 to 2 cigarettes daily, denies tobacco or illicit drug use. While in the ED, patient presented tachycardic, hypoxic into the 80s placed on nasal cannula,.  Labs notable for leukocytosis at 18.5, normal troponin and BNP.  UA with leukocyte esterase, few

## 2024-11-03 LAB
BACTERIA SPEC CULT: ABNORMAL
BACTERIA SPEC CULT: NORMAL
BACTERIA SPEC CULT: NORMAL
CC UR VC: ABNORMAL
SERVICE CMNT-IMP: ABNORMAL
SERVICE CMNT-IMP: NORMAL
SERVICE CMNT-IMP: NORMAL

## 2024-11-06 LAB
BACTERIA SPEC CULT: NORMAL
SERVICE CMNT-IMP: NORMAL

## 2024-11-07 LAB
BACTERIA SPEC CULT: NORMAL
SERVICE CMNT-IMP: NORMAL

## 2025-02-03 ENCOUNTER — TRANSCRIBE ORDERS (OUTPATIENT)
Facility: HOSPITAL | Age: 65
End: 2025-02-03

## 2025-02-03 DIAGNOSIS — Z87.891 HISTORY OF TOBACCO USE: Primary | ICD-10-CM

## 2025-02-03 DIAGNOSIS — Z12.31 VISIT FOR SCREENING MAMMOGRAM: Primary | ICD-10-CM

## 2025-03-11 NOTE — PROGRESS NOTES
0733 Bedside shift report given to Andria Mahmood RN from [facility]. Report including the following information SBAR, Kardex, recent results, MAR, intake/output and cardiac rhythm NSR.     0857 Pt shift assessment done, pt complains of no pain, pt left on side of bed.    0908 Pt medicated per MAR    1148 Pt up walking hallway denies any SOB.    1222 Pt given tylenol for headache 3/10.      185.1

## 2025-04-08 ENCOUNTER — HOSPITAL ENCOUNTER (OUTPATIENT)
Facility: HOSPITAL | Age: 65
Discharge: HOME | End: 2025-04-11
Attending: FAMILY MEDICINE
Payer: MEDICARE

## 2025-04-08 VITALS — WEIGHT: 210 LBS | HEIGHT: 65 IN | BODY MASS INDEX: 34.99 KG/M2

## 2025-04-08 DIAGNOSIS — Z12.31 VISIT FOR SCREENING MAMMOGRAM: ICD-10-CM

## 2025-04-08 DIAGNOSIS — Z87.891 HISTORY OF TOBACCO USE: ICD-10-CM

## 2025-04-08 PROCEDURE — 71271 CT THORAX LUNG CANCER SCR C-: CPT

## 2025-04-08 PROCEDURE — 77063 BREAST TOMOSYNTHESIS BI: CPT

## 2025-05-07 ENCOUNTER — HOSPITAL ENCOUNTER (OUTPATIENT)
Facility: HOSPITAL | Age: 65
Discharge: HOME | End: 2025-05-10
Payer: MEDICARE

## 2025-05-07 ENCOUNTER — TRANSCRIBE ORDERS (OUTPATIENT)
Facility: HOSPITAL | Age: 65
End: 2025-05-07

## 2025-05-07 DIAGNOSIS — M79.672 LEFT FOOT PAIN: Primary | ICD-10-CM

## 2025-05-07 DIAGNOSIS — M79.672 LEFT FOOT PAIN: ICD-10-CM

## 2025-05-07 PROCEDURE — 73620 X-RAY EXAM OF FOOT: CPT

## 2025-05-07 PROCEDURE — 73610 X-RAY EXAM OF ANKLE: CPT

## 2025-05-20 ENCOUNTER — HOSPITAL ENCOUNTER (OUTPATIENT)
Facility: HOSPITAL | Age: 65
Discharge: HOME | End: 2025-05-23
Payer: MEDICARE

## 2025-05-20 ENCOUNTER — HOSPITAL ENCOUNTER (OUTPATIENT)
Facility: HOSPITAL | Age: 65
Discharge: HOME | End: 2025-05-23

## 2025-05-20 DIAGNOSIS — R92.8 ABNORMAL MAMMOGRAM: ICD-10-CM

## 2025-05-20 PROCEDURE — G0279 TOMOSYNTHESIS, MAMMO: HCPCS

## (undated) DEVICE — Device

## (undated) DEVICE — APPLICATOR MEDICATED 26 CC SOLUTION HI LT ORNG CHLORAPREP

## (undated) DEVICE — SYRINGE MED 10ML LUERLOCK TIP W/O SFTY DISP

## (undated) DEVICE — PAD,NON-ADHERENT,3X8,STERILE,LF,1/PK: Brand: MEDLINE

## (undated) DEVICE — TROCAR: Brand: KII SHIELDED BLADED ACCESS SYSTEM

## (undated) DEVICE — STERILE POLYISOPRENE POWDER-FREE SURGICAL GLOVES: Brand: PROTEXIS

## (undated) DEVICE — ARM DRAPE

## (undated) DEVICE — ELECTRODE PT RET AD L9FT HI MOIST COND ADH HYDRGEL CORDED

## (undated) DEVICE — SEAL

## (undated) DEVICE — PROP MOUTH AD MARKEL

## (undated) DEVICE — ADHESIVE SKIN CLSR 0.7ML TOP DERMBND ADV

## (undated) DEVICE — Z DISCONTINUED NO SUB SOLUTION IRRIGATION SODIUM CHL 0.9% 100 ML BTL

## (undated) DEVICE — SUTURE MCRYL SZ 4-0 L27IN ABSRB UD L24MM PS-1 3/8 CIR PRIM Y935H

## (undated) DEVICE — BLADELESS OBTURATOR: Brand: WECK VISTA

## (undated) DEVICE — SUTURE SZ 0 27IN 5/8 CIR UR-6  TAPER PT VIOLET ABSRB VICRYL J603H

## (undated) DEVICE — SOLUTION IRRIG 1000ML H2O STRL BLT

## (undated) DEVICE — DRAPE TOWEL: Brand: CONVERTORS

## (undated) DEVICE — NEEDLE SYR 18GA L1.5IN RED PLAS HUB S STL BLNT FILL W/O

## (undated) DEVICE — TIP COVER ACCESSORY

## (undated) DEVICE — PACKING 8004007 NEURAY 200PK 13X76MM: Brand: NEURAY ®

## (undated) DEVICE — TISSUE RETRIEVAL SYSTEM: Brand: INZII RETRIEVAL SYSTEM

## (undated) DEVICE — PACK PROCEDURE SURG MAJ W/ BASIN LF

## (undated) DEVICE — SYRINGE MED 30ML STD CLR PLAS LUERLOCK TIP N CTRL DISP

## (undated) DEVICE — COLUMN DRAPE